# Patient Record
Sex: MALE | Race: BLACK OR AFRICAN AMERICAN | NOT HISPANIC OR LATINO | Employment: OTHER | URBAN - METROPOLITAN AREA
[De-identification: names, ages, dates, MRNs, and addresses within clinical notes are randomized per-mention and may not be internally consistent; named-entity substitution may affect disease eponyms.]

---

## 2018-11-14 ENCOUNTER — HOSPITAL ENCOUNTER (EMERGENCY)
Facility: HOSPITAL | Age: 68
Discharge: HOME | End: 2018-11-14
Attending: SURGERY
Payer: COMMERCIAL

## 2018-11-14 VITALS
WEIGHT: 155 LBS | DIASTOLIC BLOOD PRESSURE: 76 MMHG | HEIGHT: 69 IN | HEART RATE: 65 BPM | SYSTOLIC BLOOD PRESSURE: 142 MMHG | OXYGEN SATURATION: 99 % | RESPIRATION RATE: 15 BRPM | BODY MASS INDEX: 22.96 KG/M2

## 2018-11-14 DIAGNOSIS — M54.2 NECK PAIN: Primary | ICD-10-CM

## 2018-11-14 LAB
ALBUMIN SERPL BCP-MCNC: 4.9 G/DL
ALP SERPL-CCNC: 114 U/L
ALT SERPL W/O P-5'-P-CCNC: 54 U/L
ANION GAP SERPL CALC-SCNC: 11 MMOL/L
AST SERPL-CCNC: 70 U/L
BACTERIA #/AREA URNS AUTO: NORMAL /HPF
BASOPHILS # BLD AUTO: 0.01 K/UL
BASOPHILS NFR BLD: 0.2 %
BILIRUB SERPL-MCNC: 0.9 MG/DL
BILIRUB UR QL STRIP: NEGATIVE
BUN SERPL-MCNC: 22 MG/DL
CALCIUM SERPL-MCNC: 9.9 MG/DL
CHLORIDE SERPL-SCNC: 101 MMOL/L
CLARITY UR REFRACT.AUTO: CLEAR
CO2 SERPL-SCNC: 25 MMOL/L
COLOR UR AUTO: ABNORMAL
CREAT SERPL-MCNC: 1.12 MG/DL
DIFFERENTIAL METHOD: ABNORMAL
EOSINOPHIL # BLD AUTO: 0 K/UL
EOSINOPHIL NFR BLD: 0 %
ERYTHROCYTE [DISTWIDTH] IN BLOOD BY AUTOMATED COUNT: 12.5 %
EST. GFR  (AFRICAN AMERICAN): >60 ML/MIN/1.73 M^2
EST. GFR  (NON AFRICAN AMERICAN): >60 ML/MIN/1.73 M^2
ETHANOL SERPL-MCNC: 152 MG/DL
GLUCOSE SERPL-MCNC: 110 MG/DL
GLUCOSE UR QL STRIP: NEGATIVE
HCT VFR BLD AUTO: 40.9 %
HGB BLD-MCNC: 13.2 G/DL
HGB UR QL STRIP: ABNORMAL
HYALINE CASTS UR QL AUTO: 0 /LPF
INR PPP: 1
KETONES UR QL STRIP: ABNORMAL
LEUKOCYTE ESTERASE UR QL STRIP: ABNORMAL
LYMPHOCYTES # BLD AUTO: 1.7 K/UL
LYMPHOCYTES NFR BLD: 33.5 %
MAGNESIUM SERPL-MCNC: 1.9 MG/DL
MCH RBC QN AUTO: 29.8 PG
MCHC RBC AUTO-ENTMCNC: 32.3 G/DL
MCV RBC AUTO: 92 FL
MICROSCOPIC COMMENT: NORMAL
MONOCYTES # BLD AUTO: 0.4 K/UL
MONOCYTES NFR BLD: 8.7 %
NEUTROPHILS # BLD AUTO: 2.8 K/UL
NEUTROPHILS NFR BLD: 57.4 %
NITRITE UR QL STRIP: NEGATIVE
PH UR STRIP: 5 [PH] (ref 5–8)
PLATELET # BLD AUTO: 185 K/UL
PMV BLD AUTO: 10.5 FL
POTASSIUM SERPL-SCNC: 4.1 MMOL/L
PROT SERPL-MCNC: 8.5 G/DL
PROT UR QL STRIP: ABNORMAL
PROTHROMBIN TIME: 10.3 SEC
RBC # BLD AUTO: 4.43 M/UL
RBC #/AREA URNS AUTO: 2 /HPF (ref 0–4)
SODIUM SERPL-SCNC: 137 MMOL/L
SP GR UR STRIP: 1.02 (ref 1–1.03)
URN SPEC COLLECT METH UR: ABNORMAL
UROBILINOGEN UR STRIP-ACNC: 1 EU/DL
WBC # BLD AUTO: 4.95 K/UL
WBC #/AREA URNS AUTO: 2 /HPF (ref 0–5)

## 2018-11-14 PROCEDURE — 99284 EMERGENCY DEPT VISIT MOD MDM: CPT | Mod: 25

## 2018-11-14 PROCEDURE — 85025 COMPLETE CBC W/AUTO DIFF WBC: CPT

## 2018-11-14 PROCEDURE — 83735 ASSAY OF MAGNESIUM: CPT

## 2018-11-14 PROCEDURE — 80053 COMPREHEN METABOLIC PANEL: CPT

## 2018-11-14 PROCEDURE — 81000 URINALYSIS NONAUTO W/SCOPE: CPT

## 2018-11-14 PROCEDURE — 80320 DRUG SCREEN QUANTALCOHOLS: CPT

## 2018-11-14 PROCEDURE — 85610 PROTHROMBIN TIME: CPT

## 2018-11-14 PROCEDURE — 25000003 PHARM REV CODE 250: Performed by: SURGERY

## 2018-11-14 RX ORDER — OXYCODONE AND ACETAMINOPHEN 5; 325 MG/1; MG/1
1 TABLET ORAL
Status: COMPLETED | OUTPATIENT
Start: 2018-11-14 | End: 2018-11-14

## 2018-11-14 RX ADMIN — OXYCODONE HYDROCHLORIDE AND ACETAMINOPHEN 1 TABLET: 5; 325 TABLET ORAL at 05:11

## 2018-11-14 NOTE — ED PROVIDER NOTES
Encounter Date: 11/14/2018       History     Chief Complaint   Patient presents with    Arthritis     Arthritic pain that started one hour ago. Patient states that his joints locked up on the left side of his neck. Called daughter concerned for stroke because of numbness.       Patient was watching TV sitting in a chair when he started to get pain at the base of his head and pain and numbness down his left side of his neck to top of his left shoulder going to his left arm.  The episode started 1 hr ago He does have a history of severe arthritis and hypertension.  He does not have any speech difficulty and has no difficulty using his arm or legs.  He does have alcohol on his breath      The history is provided by the patient.   Headache    This is a new problem. The current episode started today. The problem has been unchanged. The pain is located in the left unilateral region. The pain radiates to the left arm. The pain quality is not similar to prior headaches. The quality of the pain is described as localized, pulsating and throbbing. The pain is at a severity of 10/10. Associated symptoms include neck pain and numbness. Pertinent negatives include no abdominal pain, blurred vision, loss of balance, muscle aches, scalp tenderness or sinus pressure. Nothing aggravates the symptoms.     Review of patient's allergies indicates:  No Known Allergies  History reviewed. No pertinent past medical history.  History reviewed. No pertinent surgical history.  History reviewed. No pertinent family history.  Social History     Tobacco Use    Smoking status: Current Every Day Smoker     Packs/day: 0.50   Substance Use Topics    Alcohol use: Yes     Alcohol/week: 0.0 oz     Comment: Gin    Drug use: Not on file     Review of Systems   Constitutional: Negative.    HENT: Negative.  Negative for sinus pressure.    Eyes: Negative.  Negative for blurred vision.   Respiratory: Negative.    Cardiovascular: Negative.     Gastrointestinal: Negative for abdominal pain.   Endocrine: Negative.    Musculoskeletal: Positive for neck pain.   Skin: Negative.    Neurological: Positive for numbness and headaches. Negative for loss of balance.   Hematological: Negative.    Psychiatric/Behavioral: Negative.        Physical Exam     Initial Vitals [11/14/18 1714]   BP Pulse Resp Temp SpO2   (!) 186/99 77 18 -- 99 %      MAP       --         Physical Exam    Nursing note and vitals reviewed.  Constitutional: He appears well-developed and well-nourished.   HENT:   Head: Normocephalic.   Eyes: Conjunctivae are normal.   Neck: Normal range of motion. Neck supple. No JVD present.       Cardiovascular: Normal rate and intact distal pulses.   Pulmonary/Chest: Breath sounds normal. No stridor.   Abdominal: Soft.   Musculoskeletal: He exhibits tenderness.   Lymphadenopathy:     He has no cervical adenopathy.   Neurological: He is alert and oriented to person, place, and time. GCS score is 15. GCS eye subscore is 4. GCS verbal subscore is 5. GCS motor subscore is 6.   NIH scale 0   Skin: Skin is warm and dry. Capillary refill takes less than 2 seconds.   Psychiatric: He has a normal mood and affect.         ED Course   Procedures  Labs Reviewed - No data to display       Imaging Results          CT Head Without Contrast (In process)                CT Cervical Spine Without Contrast (In process)                                       Clinical Impression:   The encounter diagnosis was Neck pain.                             Eliu Márquez MD  11/14/18 [id number]

## 2020-06-08 ENCOUNTER — HOSPITAL ENCOUNTER (EMERGENCY)
Facility: HOSPITAL | Age: 70
Discharge: HOME | End: 2020-06-08
Attending: EMERGENCY MEDICINE
Payer: COMMERCIAL

## 2020-06-08 VITALS
TEMPERATURE: 98 F | BODY MASS INDEX: 23.25 KG/M2 | HEIGHT: 66 IN | HEART RATE: 72 BPM | DIASTOLIC BLOOD PRESSURE: 64 MMHG | SYSTOLIC BLOOD PRESSURE: 102 MMHG | WEIGHT: 144.69 LBS | RESPIRATION RATE: 16 BRPM | OXYGEN SATURATION: 99 %
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
## 2020-06-08 DIAGNOSIS — S00.81XA: ICD-10-CM

## 2020-06-08 DIAGNOSIS — F10.921 ALCOHOL INTOXICATION WITH DELIRIUM: ICD-10-CM

## 2020-06-08 LAB — ETHANOL SERPL-MCNC: 369 MG/DL

## 2020-06-08 PROCEDURE — 96365 THER/PROPH/DIAG IV INF INIT: CPT | Mod: ER

## 2020-06-08 PROCEDURE — 25000003 PHARM REV CODE 250: Mod: ER | Performed by: EMERGENCY MEDICINE

## 2020-06-08 PROCEDURE — 90471 IMMUNIZATION ADMIN: CPT | Mod: ER | Performed by: EMERGENCY MEDICINE

## 2020-06-08 PROCEDURE — 63600175 PHARM REV CODE 636 W HCPCS: Mod: ER | Performed by: EMERGENCY MEDICINE

## 2020-06-08 PROCEDURE — 96368 THER/DIAG CONCURRENT INF: CPT | Mod: ER

## 2020-06-08 PROCEDURE — 80320 DRUG SCREEN QUANTALCOHOLS: CPT | Mod: ER

## 2020-06-08 PROCEDURE — 99284 EMERGENCY DEPT VISIT MOD MDM: CPT | Mod: 25,ER

## 2020-06-08 PROCEDURE — 90715 TDAP VACCINE 7 YRS/> IM: CPT | Mod: ER | Performed by: EMERGENCY MEDICINE

## 2020-06-08 RX ADMIN — CLOSTRIDIUM TETANI TOXOID ANTIGEN (FORMALDEHYDE INACTIVATED), CORYNEBACTERIUM DIPHTHERIAE TOXOID ANTIGEN (FORMALDEHYDE INACTIVATED), BORDETELLA PERTUSSIS TOXOID ANTIGEN (GLUTARALDEHYDE INACTIVATED), BORDETELLA PERTUSSIS FILAMENTOUS HEMAGGLUTININ ANTIGEN (FORMALDEHYDE INACTIVATED), BORDETELLA PERTUSSIS PERTACTIN ANTIGEN, AND BORDETELLA PERTUSSIS FIMBRIAE 2/3 ANTIGEN 0.5 ML: 5; 2; 2.5; 5; 3; 5 INJECTION, SUSPENSION INTRAMUSCULAR at 08:06

## 2020-06-08 RX ADMIN — THIAMINE HYDROCHLORIDE 100 MG: 100 INJECTION, SOLUTION INTRAMUSCULAR; INTRAVENOUS at 09:06

## 2020-06-08 RX ADMIN — ASCORBIC ACID, VITAMIN A PALMITATE, CHOLECALCIFEROL, THIAMINE HYDROCHLORIDE, RIBOFLAVIN-5 PHOSPHATE SODIUM, PYRIDOXINE HYDROCHLORIDE, NIACINAMIDE, DEXPANTHENOL, ALPHA-TOCOPHEROL ACETATE, VITAMIN K1, FOLIC ACID, BIOTIN, CYANOCOBALAMIN: 200; 3300; 200; 6; 3.6; 6; 40; 15; 10; 150; 600; 60; 5 INJECTION, SOLUTION INTRAVENOUS at 09:06

## 2020-06-08 RX ADMIN — BACITRACIN, NEOMYCIN, POLYMYXIN B 1 EACH: 400; 3.5; 5 OINTMENT TOPICAL at 08:06

## 2020-06-08 RX ADMIN — FOLIC ACID 1 MG: 5 INJECTION, SOLUTION INTRAMUSCULAR; INTRAVENOUS; SUBCUTANEOUS at 09:06

## 2020-06-09 NOTE — ED NOTES
Pt confused and pulled out his IV. Only received 200ML IVF's. Made MD aware. Pt is awake, alert and communicative. Discharge indicated.
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
## 2021-01-20 ENCOUNTER — HOSPITAL ENCOUNTER (EMERGENCY)
Facility: HOSPITAL | Age: 71
Discharge: HOME | End: 2021-01-20
Attending: EMERGENCY MEDICINE
Payer: COMMERCIAL

## 2021-01-20 VITALS
SYSTOLIC BLOOD PRESSURE: 119 MMHG | DIASTOLIC BLOOD PRESSURE: 74 MMHG | BODY MASS INDEX: 23.11 KG/M2 | RESPIRATION RATE: 18 BRPM | TEMPERATURE: 99 F | OXYGEN SATURATION: 99 % | HEIGHT: 69 IN | HEART RATE: 70 BPM | WEIGHT: 156 LBS
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
## 2021-01-20 DIAGNOSIS — T07.XXXA MULTIPLE ABRASIONS: ICD-10-CM

## 2021-01-20 PROCEDURE — 99283 EMERGENCY DEPT VISIT LOW MDM: CPT | Mod: ER

## 2021-01-20 PROCEDURE — 25000003 PHARM REV CODE 250: Mod: ER | Performed by: EMERGENCY MEDICINE

## 2021-01-20 RX ORDER — TRAMADOL HYDROCHLORIDE AND ACETAMINOPHEN 37.5; 325 MG/1; MG/1
1 TABLET, FILM COATED ORAL EVERY 6 HOURS PRN
Qty: 9 TABLET | Refills: 0 | Status: ON HOLD | OUTPATIENT
Start: 2021-01-20 | End: 2021-09-07 | Stop reason: HOSPADM

## 2021-01-20 RX ADMIN — BACITRACIN ZINC NEOMYCIN SULFATE POLYMYXIN B SULFATE: 400; 3.5; 5 OINTMENT TOPICAL at 11:01

## 2021-06-30 ENCOUNTER — HOSPITAL ENCOUNTER (EMERGENCY)
Facility: HOSPITAL | Age: 71
Discharge: HOME | End: 2021-06-30
Attending: EMERGENCY MEDICINE
Payer: COMMERCIAL

## 2021-06-30 VITALS
SYSTOLIC BLOOD PRESSURE: 111 MMHG | BODY MASS INDEX: 21.48 KG/M2 | HEART RATE: 81 BPM | HEIGHT: 69 IN | WEIGHT: 145 LBS | DIASTOLIC BLOOD PRESSURE: 59 MMHG | RESPIRATION RATE: 20 BRPM | TEMPERATURE: 99 F | OXYGEN SATURATION: 97 %
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
## 2021-06-30 DIAGNOSIS — S29.9XXA CHEST TRAUMA: ICD-10-CM

## 2021-06-30 DIAGNOSIS — S20.211A CONTUSION OF RIGHT CHEST WALL, INITIAL ENCOUNTER: Primary | ICD-10-CM

## 2021-06-30 LAB — POCT GLUCOSE: 75 MG/DL (ref 70–110)

## 2021-06-30 PROCEDURE — 93005 ELECTROCARDIOGRAM TRACING: CPT | Mod: ER

## 2021-06-30 PROCEDURE — 93010 EKG 12-LEAD: ICD-10-PCS | Mod: ,,, | Performed by: INTERNAL MEDICINE

## 2021-06-30 PROCEDURE — 82962 GLUCOSE BLOOD TEST: CPT | Mod: ER

## 2021-06-30 PROCEDURE — 99284 EMERGENCY DEPT VISIT MOD MDM: CPT | Mod: 25,ER

## 2021-06-30 PROCEDURE — 93010 ELECTROCARDIOGRAM REPORT: CPT | Mod: ,,, | Performed by: INTERNAL MEDICINE

## 2021-09-06 ENCOUNTER — HOSPITAL ENCOUNTER (OUTPATIENT)
Facility: HOSPITAL | Age: 71
Discharge: HOME | End: 2021-09-07
Attending: EMERGENCY MEDICINE | Admitting: INTERNAL MEDICINE
Payer: COMMERCIAL

## 2021-09-06 DIAGNOSIS — M79.652 ACUTE PAIN OF LEFT THIGH: ICD-10-CM

## 2021-09-06 DIAGNOSIS — M25.551 ACUTE RIGHT HIP PAIN: ICD-10-CM

## 2021-09-06 DIAGNOSIS — R10.12 ACUTE LUQ PAIN: ICD-10-CM
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
## 2021-09-06 DIAGNOSIS — M79.641 RIGHT HAND PAIN: ICD-10-CM

## 2021-09-06 DIAGNOSIS — S09.90XA CLOSED HEAD INJURY DUE TO BICYCLE ACCIDENT, INITIAL ENCOUNTER: Primary | ICD-10-CM

## 2021-09-06 DIAGNOSIS — R07.9 CHEST PAIN: ICD-10-CM

## 2021-09-06 DIAGNOSIS — M54.9 ACUTE UPPER BACK PAIN: ICD-10-CM

## 2021-09-06 DIAGNOSIS — T07.XXXA CRITICAL POLYTRAUMA: ICD-10-CM

## 2021-09-06 DIAGNOSIS — T14.8XXA SKIN AVULSION: ICD-10-CM

## 2021-09-06 PROBLEM — R03.0 ELEVATED BLOOD PRESSURE READING WITHOUT DIAGNOSIS OF HYPERTENSION: Status: ACTIVE | Noted: 2021-09-06

## 2021-09-06 PROBLEM — N17.9 AKI (ACUTE KIDNEY INJURY): Status: ACTIVE | Noted: 2021-09-06

## 2021-09-06 PROBLEM — R17 SERUM TOTAL BILIRUBIN ELEVATED: Status: ACTIVE | Noted: 2021-09-06

## 2021-09-06 LAB
ABO + RH BLD: NORMAL
ALBUMIN SERPL BCP-MCNC: 3.9 G/DL (ref 3.5–5.2)
ALP SERPL-CCNC: 87 U/L (ref 55–135)
ALT SERPL W/O P-5'-P-CCNC: 26 U/L (ref 10–44)
ANION GAP SERPL CALC-SCNC: 12 MMOL/L (ref 8–16)
APTT BLDCRRT: 24.7 SEC (ref 21–32)
AST SERPL-CCNC: 42 U/L (ref 10–40)
BACTERIA #/AREA URNS AUTO: ABNORMAL /HPF
BASOPHILS # BLD AUTO: 0.04 K/UL (ref 0–0.2)
BASOPHILS NFR BLD: 0.8 % (ref 0–1.9)
BILIRUB SERPL-MCNC: 1.2 MG/DL (ref 0.1–1)
BILIRUB UR QL STRIP: NEGATIVE
BLD GP AB SCN CELLS X3 SERPL QL: NORMAL
BUN SERPL-MCNC: 37 MG/DL (ref 6–30)
BUN SERPL-MCNC: 37 MG/DL (ref 8–23)
CALCIUM SERPL-MCNC: 9.3 MG/DL (ref 8.7–10.5)
CHLORIDE SERPL-SCNC: 105 MMOL/L (ref 95–110)
CHLORIDE SERPL-SCNC: 106 MMOL/L (ref 95–110)
CLARITY UR REFRACT.AUTO: CLEAR
CO2 SERPL-SCNC: 20 MMOL/L (ref 23–29)
COLOR UR AUTO: YELLOW
CREAT SERPL-MCNC: 1.7 MG/DL (ref 0.5–1.4)
CREAT SERPL-MCNC: 2.2 MG/DL (ref 0.5–1.4)
CTP QC/QA: YES
DIFFERENTIAL METHOD: ABNORMAL
EOSINOPHIL # BLD AUTO: 0 K/UL (ref 0–0.5)
EOSINOPHIL NFR BLD: 0.8 % (ref 0–8)
ERYTHROCYTE [DISTWIDTH] IN BLOOD BY AUTOMATED COUNT: 12.3 % (ref 11.5–14.5)
EST. GFR  (AFRICAN AMERICAN): 45.2 ML/MIN/1.73 M^2
EST. GFR  (NON AFRICAN AMERICAN): 39.1 ML/MIN/1.73 M^2
GLUCOSE SERPL-MCNC: 83 MG/DL (ref 70–110)
GLUCOSE SERPL-MCNC: 87 MG/DL (ref 70–110)
GLUCOSE UR QL STRIP: NEGATIVE
HCT VFR BLD AUTO: 36 % (ref 40–54)
HCT VFR BLD CALC: 39 %PCV (ref 36–54)
HGB BLD-MCNC: 11.5 G/DL (ref 14–18)
HGB UR QL STRIP: ABNORMAL
IMM GRANULOCYTES # BLD AUTO: 0.02 K/UL (ref 0–0.04)
IMM GRANULOCYTES NFR BLD AUTO: 0.4 % (ref 0–0.5)
INR PPP: 0.9 (ref 0.8–1.2)
KETONES UR QL STRIP: NEGATIVE
LEUKOCYTE ESTERASE UR QL STRIP: ABNORMAL
LYMPHOCYTES # BLD AUTO: 1.5 K/UL (ref 1–4.8)
LYMPHOCYTES NFR BLD: 31.3 % (ref 18–48)
MCH RBC QN AUTO: 31 PG (ref 27–31)
MCHC RBC AUTO-ENTMCNC: 31.9 G/DL (ref 32–36)
MCV RBC AUTO: 97 FL (ref 82–98)
MICROSCOPIC COMMENT: ABNORMAL
MONOCYTES # BLD AUTO: 0.4 K/UL (ref 0.3–1)
MONOCYTES NFR BLD: 8.8 % (ref 4–15)
NEUTROPHILS # BLD AUTO: 2.8 K/UL (ref 1.8–7.7)
NEUTROPHILS NFR BLD: 57.9 % (ref 38–73)
NITRITE UR QL STRIP: NEGATIVE
NRBC BLD-RTO: 0 /100 WBC
PH UR STRIP: 5 [PH] (ref 5–8)
PLATELET # BLD AUTO: 217 K/UL (ref 150–450)
PMV BLD AUTO: 10.1 FL (ref 9.2–12.9)
POC IONIZED CALCIUM: 1.25 MMOL/L (ref 1.06–1.42)
POC TCO2 (MEASURED): 23 MMOL/L (ref 23–29)
POTASSIUM BLD-SCNC: 4.6 MMOL/L (ref 3.5–5.1)
POTASSIUM SERPL-SCNC: 4.3 MMOL/L (ref 3.5–5.1)
PROT SERPL-MCNC: 6.9 G/DL (ref 6–8.4)
PROT UR QL STRIP: NEGATIVE
PROTHROMBIN TIME: 9.9 SEC (ref 9–12.5)
RBC # BLD AUTO: 3.71 M/UL (ref 4.6–6.2)
RBC #/AREA URNS AUTO: 1 /HPF (ref 0–4)
SAMPLE: ABNORMAL
SARS-COV-2 RDRP RESP QL NAA+PROBE: NEGATIVE
SODIUM BLD-SCNC: 138 MMOL/L (ref 136–145)
SODIUM SERPL-SCNC: 138 MMOL/L (ref 136–145)
SP GR UR STRIP: 1.01 (ref 1–1.03)
SQUAMOUS #/AREA URNS AUTO: 0 /HPF
URN SPEC COLLECT METH UR: ABNORMAL
WBC # BLD AUTO: 4.8 K/UL (ref 3.9–12.7)
WBC #/AREA URNS AUTO: 10 /HPF (ref 0–5)

## 2021-09-06 PROCEDURE — 25000003 PHARM REV CODE 250: Performed by: PHYSICIAN ASSISTANT

## 2021-09-06 PROCEDURE — 63600175 PHARM REV CODE 636 W HCPCS: Performed by: STUDENT IN AN ORGANIZED HEALTH CARE EDUCATION/TRAINING PROGRAM

## 2021-09-06 PROCEDURE — G0378 HOSPITAL OBSERVATION PER HR: HCPCS

## 2021-09-06 PROCEDURE — 90715 TDAP VACCINE 7 YRS/> IM: CPT | Performed by: STUDENT IN AN ORGANIZED HEALTH CARE EDUCATION/TRAINING PROGRAM

## 2021-09-06 PROCEDURE — 96372 THER/PROPH/DIAG INJ SC/IM: CPT | Mod: 59

## 2021-09-06 PROCEDURE — 80053 COMPREHEN METABOLIC PANEL: CPT | Performed by: STUDENT IN AN ORGANIZED HEALTH CARE EDUCATION/TRAINING PROGRAM

## 2021-09-06 PROCEDURE — 85610 PROTHROMBIN TIME: CPT | Performed by: STUDENT IN AN ORGANIZED HEALTH CARE EDUCATION/TRAINING PROGRAM

## 2021-09-06 PROCEDURE — 96374 THER/PROPH/DIAG INJ IV PUSH: CPT

## 2021-09-06 PROCEDURE — 99220 PR INITIAL OBSERVATION CARE,LEVL III: CPT | Mod: ,,, | Performed by: PHYSICIAN ASSISTANT

## 2021-09-06 PROCEDURE — 25000003 PHARM REV CODE 250: Performed by: EMERGENCY MEDICINE

## 2021-09-06 PROCEDURE — U0002 COVID-19 LAB TEST NON-CDC: HCPCS | Performed by: STUDENT IN AN ORGANIZED HEALTH CARE EDUCATION/TRAINING PROGRAM

## 2021-09-06 PROCEDURE — 99220 PR INITIAL OBSERVATION CARE,LEVL III: ICD-10-PCS | Mod: ,,, | Performed by: PHYSICIAN ASSISTANT

## 2021-09-06 PROCEDURE — 90471 IMMUNIZATION ADMIN: CPT | Performed by: STUDENT IN AN ORGANIZED HEALTH CARE EDUCATION/TRAINING PROGRAM

## 2021-09-06 PROCEDURE — 63600175 PHARM REV CODE 636 W HCPCS: Performed by: PHYSICIAN ASSISTANT

## 2021-09-06 PROCEDURE — 99285 PR EMERGENCY DEPT VISIT,LEVEL V: ICD-10-PCS | Mod: CS,,, | Performed by: EMERGENCY MEDICINE

## 2021-09-06 PROCEDURE — 99285 EMERGENCY DEPT VISIT HI MDM: CPT | Mod: CS,,, | Performed by: EMERGENCY MEDICINE

## 2021-09-06 PROCEDURE — 85730 THROMBOPLASTIN TIME PARTIAL: CPT | Performed by: STUDENT IN AN ORGANIZED HEALTH CARE EDUCATION/TRAINING PROGRAM

## 2021-09-06 PROCEDURE — 93010 ELECTROCARDIOGRAM REPORT: CPT | Mod: ,,, | Performed by: INTERNAL MEDICINE

## 2021-09-06 PROCEDURE — 85025 COMPLETE CBC W/AUTO DIFF WBC: CPT | Performed by: STUDENT IN AN ORGANIZED HEALTH CARE EDUCATION/TRAINING PROGRAM

## 2021-09-06 PROCEDURE — 96361 HYDRATE IV INFUSION ADD-ON: CPT

## 2021-09-06 PROCEDURE — 81001 URINALYSIS AUTO W/SCOPE: CPT | Performed by: PHYSICIAN ASSISTANT

## 2021-09-06 PROCEDURE — 86900 BLOOD TYPING SEROLOGIC ABO: CPT | Performed by: STUDENT IN AN ORGANIZED HEALTH CARE EDUCATION/TRAINING PROGRAM

## 2021-09-06 PROCEDURE — 86803 HEPATITIS C AB TEST: CPT | Performed by: EMERGENCY MEDICINE

## 2021-09-06 PROCEDURE — 80047 BASIC METABLC PNL IONIZED CA: CPT | Mod: 91

## 2021-09-06 PROCEDURE — 93010 EKG 12-LEAD: ICD-10-PCS | Mod: ,,, | Performed by: INTERNAL MEDICINE

## 2021-09-06 PROCEDURE — 99285 EMERGENCY DEPT VISIT HI MDM: CPT | Mod: 25

## 2021-09-06 PROCEDURE — 93005 ELECTROCARDIOGRAM TRACING: CPT

## 2021-09-06 RX ORDER — GLUCAGON 1 MG
1 KIT INJECTION
Status: DISCONTINUED | OUTPATIENT
Start: 2021-09-06 | End: 2021-09-07 | Stop reason: HOSPADM

## 2021-09-06 RX ORDER — IBUPROFEN 200 MG
24 TABLET ORAL
Status: DISCONTINUED | OUTPATIENT
Start: 2021-09-06 | End: 2021-09-07 | Stop reason: HOSPADM

## 2021-09-06 RX ORDER — SODIUM CHLORIDE, SODIUM LACTATE, POTASSIUM CHLORIDE, CALCIUM CHLORIDE 600; 310; 30; 20 MG/100ML; MG/100ML; MG/100ML; MG/100ML
INJECTION, SOLUTION INTRAVENOUS CONTINUOUS
Status: ACTIVE | OUTPATIENT
Start: 2021-09-06 | End: 2021-09-07

## 2021-09-06 RX ORDER — POLYETHYLENE GLYCOL 3350 17 G/17G
17 POWDER, FOR SOLUTION ORAL 2 TIMES DAILY PRN
Status: DISCONTINUED | OUTPATIENT
Start: 2021-09-06 | End: 2021-09-07 | Stop reason: HOSPADM

## 2021-09-06 RX ORDER — LIDOCAINE 50 MG/G
2 PATCH TOPICAL
Status: DISCONTINUED | OUTPATIENT
Start: 2021-09-06 | End: 2021-09-07 | Stop reason: HOSPADM

## 2021-09-06 RX ORDER — ENOXAPARIN SODIUM 100 MG/ML
40 INJECTION SUBCUTANEOUS EVERY 24 HOURS
Status: DISCONTINUED | OUTPATIENT
Start: 2021-09-06 | End: 2021-09-07 | Stop reason: HOSPADM

## 2021-09-06 RX ORDER — OXYCODONE HYDROCHLORIDE 5 MG/1
5 TABLET ORAL EVERY 6 HOURS PRN
Status: DISCONTINUED | OUTPATIENT
Start: 2021-09-06 | End: 2021-09-07 | Stop reason: HOSPADM

## 2021-09-06 RX ORDER — ACETAMINOPHEN 325 MG/1
650 TABLET ORAL EVERY 4 HOURS PRN
Status: DISCONTINUED | OUTPATIENT
Start: 2021-09-06 | End: 2021-09-07 | Stop reason: HOSPADM

## 2021-09-06 RX ORDER — POLYETHYLENE GLYCOL 3350 17 G/17G
17 POWDER, FOR SOLUTION ORAL DAILY
Status: DISCONTINUED | OUTPATIENT
Start: 2021-09-07 | End: 2021-09-06

## 2021-09-06 RX ORDER — IBUPROFEN 200 MG
16 TABLET ORAL
Status: DISCONTINUED | OUTPATIENT
Start: 2021-09-06 | End: 2021-09-07 | Stop reason: HOSPADM

## 2021-09-06 RX ORDER — TALC
6 POWDER (GRAM) TOPICAL NIGHTLY PRN
Status: DISCONTINUED | OUTPATIENT
Start: 2021-09-06 | End: 2021-09-07 | Stop reason: HOSPADM

## 2021-09-06 RX ORDER — IBUPROFEN 200 MG
1 TABLET ORAL DAILY PRN
Status: DISCONTINUED | OUTPATIENT
Start: 2021-09-06 | End: 2021-09-07 | Stop reason: HOSPADM

## 2021-09-06 RX ORDER — FENTANYL CITRATE 50 UG/ML
70 INJECTION, SOLUTION INTRAMUSCULAR; INTRAVENOUS
Status: COMPLETED | OUTPATIENT
Start: 2021-09-06 | End: 2021-09-06

## 2021-09-06 RX ORDER — BISACODYL 10 MG
10 SUPPOSITORY, RECTAL RECTAL DAILY PRN
Status: DISCONTINUED | OUTPATIENT
Start: 2021-09-06 | End: 2021-09-07 | Stop reason: HOSPADM

## 2021-09-06 RX ORDER — ONDANSETRON 8 MG/1
8 TABLET, ORALLY DISINTEGRATING ORAL EVERY 8 HOURS PRN
Status: DISCONTINUED | OUTPATIENT
Start: 2021-09-06 | End: 2021-09-07 | Stop reason: HOSPADM

## 2021-09-06 RX ORDER — OXYCODONE HYDROCHLORIDE 10 MG/1
10 TABLET ORAL EVERY 6 HOURS PRN
Status: DISCONTINUED | OUTPATIENT
Start: 2021-09-06 | End: 2021-09-07 | Stop reason: HOSPADM

## 2021-09-06 RX ORDER — METHOCARBAMOL 500 MG/1
500 TABLET, FILM COATED ORAL 4 TIMES DAILY
Status: DISCONTINUED | OUTPATIENT
Start: 2021-09-06 | End: 2021-09-07 | Stop reason: HOSPADM

## 2021-09-06 RX ORDER — ACETAMINOPHEN 500 MG
1000 TABLET ORAL EVERY 8 HOURS
Status: DISCONTINUED | OUTPATIENT
Start: 2021-09-07 | End: 2021-09-06

## 2021-09-06 RX ORDER — IPRATROPIUM BROMIDE AND ALBUTEROL SULFATE 2.5; .5 MG/3ML; MG/3ML
3 SOLUTION RESPIRATORY (INHALATION) EVERY 4 HOURS PRN
Status: DISCONTINUED | OUTPATIENT
Start: 2021-09-06 | End: 2021-09-07 | Stop reason: HOSPADM

## 2021-09-06 RX ADMIN — ENOXAPARIN SODIUM 40 MG: 40 INJECTION SUBCUTANEOUS at 07:09

## 2021-09-06 RX ADMIN — CLOSTRIDIUM TETANI TOXOID ANTIGEN (FORMALDEHYDE INACTIVATED), CORYNEBACTERIUM DIPHTHERIAE TOXOID ANTIGEN (FORMALDEHYDE INACTIVATED), BORDETELLA PERTUSSIS TOXOID ANTIGEN (GLUTARALDEHYDE INACTIVATED), BORDETELLA PERTUSSIS FILAMENTOUS HEMAGGLUTININ ANTIGEN (FORMALDEHYDE INACTIVATED), BORDETELLA PERTUSSIS PERTACTIN ANTIGEN, AND BORDETELLA PERTUSSIS FIMBRIAE 2/3 ANTIGEN 0.5 ML: 5; 2; 2.5; 5; 3; 5 INJECTION, SUSPENSION INTRAMUSCULAR at 05:09

## 2021-09-06 RX ADMIN — SODIUM CHLORIDE, SODIUM LACTATE, POTASSIUM CHLORIDE, AND CALCIUM CHLORIDE: .6; .31; .03; .02 INJECTION, SOLUTION INTRAVENOUS at 07:09

## 2021-09-06 RX ADMIN — LIDOCAINE 2 PATCH: 50 PATCH TOPICAL at 07:09

## 2021-09-06 RX ADMIN — SODIUM CHLORIDE 1000 ML: 0.9 INJECTION, SOLUTION INTRAVENOUS at 04:09

## 2021-09-06 RX ADMIN — FENTANYL CITRATE 70 MCG: 50 INJECTION INTRAMUSCULAR; INTRAVENOUS at 04:09

## 2021-09-06 RX ADMIN — OXYCODONE 10 MG: 5 TABLET ORAL at 07:09

## 2021-09-06 RX ADMIN — METHOCARBAMOL 500 MG: 500 TABLET ORAL at 09:09

## 2021-09-07 VITALS
TEMPERATURE: 97 F | OXYGEN SATURATION: 100 % | HEART RATE: 80 BPM | HEIGHT: 68 IN | SYSTOLIC BLOOD PRESSURE: 128 MMHG | WEIGHT: 155 LBS | BODY MASS INDEX: 23.49 KG/M2 | DIASTOLIC BLOOD PRESSURE: 80 MMHG | RESPIRATION RATE: 16 BRPM

## 2021-09-07 LAB
ALBUMIN SERPL BCP-MCNC: 3.9 G/DL (ref 3.5–5.2)
ALP SERPL-CCNC: 90 U/L (ref 55–135)
ALT SERPL W/O P-5'-P-CCNC: 26 U/L (ref 10–44)
ANION GAP SERPL CALC-SCNC: 12 MMOL/L (ref 8–16)
AST SERPL-CCNC: 52 U/L (ref 10–40)
BASOPHILS # BLD AUTO: 0.02 K/UL (ref 0–0.2)
BASOPHILS NFR BLD: 0.3 % (ref 0–1.9)
BILIRUB SERPL-MCNC: 1.9 MG/DL (ref 0.1–1)
BUN SERPL-MCNC: 25 MG/DL (ref 8–23)
CALCIUM SERPL-MCNC: 9.5 MG/DL (ref 8.7–10.5)
CHLORIDE SERPL-SCNC: 104 MMOL/L (ref 95–110)
CO2 SERPL-SCNC: 19 MMOL/L (ref 23–29)
CREAT SERPL-MCNC: 1.1 MG/DL (ref 0.5–1.4)
DIFFERENTIAL METHOD: ABNORMAL
EOSINOPHIL # BLD AUTO: 0 K/UL (ref 0–0.5)
EOSINOPHIL NFR BLD: 0.1 % (ref 0–8)
ERYTHROCYTE [DISTWIDTH] IN BLOOD BY AUTOMATED COUNT: 12.2 % (ref 11.5–14.5)
EST. GFR  (AFRICAN AMERICAN): >60 ML/MIN/1.73 M^2
EST. GFR  (NON AFRICAN AMERICAN): >60 ML/MIN/1.73 M^2
ESTIMATED AVG GLUCOSE: 88 MG/DL (ref 68–131)
FERRITIN SERPL-MCNC: 633 NG/ML (ref 20–300)
FOLATE SERPL-MCNC: 9.2 NG/ML (ref 4–24)
GLUCOSE SERPL-MCNC: 108 MG/DL (ref 70–110)
HBA1C MFR BLD: 4.7 % (ref 4–5.6)
HCT VFR BLD AUTO: 37.1 % (ref 40–54)
HCV AB SERPL QL IA: NEGATIVE
HGB BLD-MCNC: 11.9 G/DL (ref 14–18)
IMM GRANULOCYTES # BLD AUTO: 0.01 K/UL (ref 0–0.04)
IMM GRANULOCYTES NFR BLD AUTO: 0.1 % (ref 0–0.5)
IRON SERPL-MCNC: 42 UG/DL (ref 45–160)
LYMPHOCYTES # BLD AUTO: 0.7 K/UL (ref 1–4.8)
LYMPHOCYTES NFR BLD: 11 % (ref 18–48)
MAGNESIUM SERPL-MCNC: 1.6 MG/DL (ref 1.6–2.6)
MCH RBC QN AUTO: 31 PG (ref 27–31)
MCHC RBC AUTO-ENTMCNC: 32.1 G/DL (ref 32–36)
MCV RBC AUTO: 97 FL (ref 82–98)
MONOCYTES # BLD AUTO: 0.7 K/UL (ref 0.3–1)
MONOCYTES NFR BLD: 10.2 % (ref 4–15)
NEUTROPHILS # BLD AUTO: 5.3 K/UL (ref 1.8–7.7)
NEUTROPHILS NFR BLD: 78.3 % (ref 38–73)
NRBC BLD-RTO: 0 /100 WBC
PHOSPHATE SERPL-MCNC: 2.4 MG/DL (ref 2.7–4.5)
PLATELET # BLD AUTO: 201 K/UL (ref 150–450)
PMV BLD AUTO: 11.5 FL (ref 9.2–12.9)
POTASSIUM SERPL-SCNC: 3.8 MMOL/L (ref 3.5–5.1)
PROT SERPL-MCNC: 6.8 G/DL (ref 6–8.4)
RBC # BLD AUTO: 3.84 M/UL (ref 4.6–6.2)
SATURATED IRON: 14 % (ref 20–50)
SODIUM SERPL-SCNC: 135 MMOL/L (ref 136–145)
TOTAL IRON BINDING CAPACITY: 308 UG/DL (ref 250–450)
TRANSFERRIN SERPL-MCNC: 208 MG/DL (ref 200–375)
VIT B12 SERPL-MCNC: 431 PG/ML (ref 210–950)
WBC # BLD AUTO: 6.75 K/UL (ref 3.9–12.7)

## 2021-09-07 PROCEDURE — 99217 PR OBSERVATION CARE DISCHARGE: CPT | Mod: ,,, | Performed by: PHYSICIAN ASSISTANT

## 2021-09-07 PROCEDURE — 82607 VITAMIN B-12: CPT | Performed by: PHYSICIAN ASSISTANT

## 2021-09-07 PROCEDURE — 25000003 PHARM REV CODE 250: Performed by: PHYSICIAN ASSISTANT

## 2021-09-07 PROCEDURE — 83735 ASSAY OF MAGNESIUM: CPT | Performed by: PHYSICIAN ASSISTANT

## 2021-09-07 PROCEDURE — 82728 ASSAY OF FERRITIN: CPT | Performed by: PHYSICIAN ASSISTANT

## 2021-09-07 PROCEDURE — 84466 ASSAY OF TRANSFERRIN: CPT | Performed by: PHYSICIAN ASSISTANT

## 2021-09-07 PROCEDURE — 97116 GAIT TRAINING THERAPY: CPT

## 2021-09-07 PROCEDURE — 85025 COMPLETE CBC W/AUTO DIFF WBC: CPT | Performed by: PHYSICIAN ASSISTANT

## 2021-09-07 PROCEDURE — 99217 PR OBSERVATION CARE DISCHARGE: ICD-10-PCS | Mod: ,,, | Performed by: PHYSICIAN ASSISTANT

## 2021-09-07 PROCEDURE — G0378 HOSPITAL OBSERVATION PER HR: HCPCS

## 2021-09-07 PROCEDURE — 83036 HEMOGLOBIN GLYCOSYLATED A1C: CPT | Performed by: PHYSICIAN ASSISTANT

## 2021-09-07 PROCEDURE — 97535 SELF CARE MNGMENT TRAINING: CPT

## 2021-09-07 PROCEDURE — 80053 COMPREHEN METABOLIC PANEL: CPT | Performed by: PHYSICIAN ASSISTANT

## 2021-09-07 PROCEDURE — 84100 ASSAY OF PHOSPHORUS: CPT | Performed by: PHYSICIAN ASSISTANT

## 2021-09-07 PROCEDURE — 82746 ASSAY OF FOLIC ACID SERUM: CPT | Performed by: PHYSICIAN ASSISTANT

## 2021-09-07 PROCEDURE — 97165 OT EVAL LOW COMPLEX 30 MIN: CPT

## 2021-09-07 PROCEDURE — 97161 PT EVAL LOW COMPLEX 20 MIN: CPT

## 2021-09-07 PROCEDURE — 36415 COLL VENOUS BLD VENIPUNCTURE: CPT | Performed by: PHYSICIAN ASSISTANT

## 2021-09-07 RX ORDER — OXYCODONE AND ACETAMINOPHEN 5; 325 MG/1; MG/1
1 TABLET ORAL EVERY 12 HOURS PRN
Qty: 6 TABLET | Refills: 0 | Status: SHIPPED | OUTPATIENT
Start: 2021-09-07 | End: 2021-09-10

## 2021-09-07 RX ORDER — METHOCARBAMOL 500 MG/1
500 TABLET, FILM COATED ORAL 4 TIMES DAILY PRN
Qty: 40 TABLET | Refills: 0 | Status: SHIPPED | OUTPATIENT
Start: 2021-09-07 | End: 2021-09-17

## 2021-09-07 RX ADMIN — METHOCARBAMOL 500 MG: 500 TABLET ORAL at 09:09

## 2021-09-07 RX ADMIN — METHOCARBAMOL 500 MG: 500 TABLET ORAL at 01:09

## 2021-09-07 RX ADMIN — ACETAMINOPHEN 650 MG: 325 TABLET ORAL at 09:09

## 2024-02-18 ENCOUNTER — HOSPITAL ENCOUNTER (EMERGENCY)
Facility: HOSPITAL | Age: 74
Discharge: HOME | End: 2024-02-18
Attending: EMERGENCY MEDICINE
Payer: MEDICARE

## 2024-02-18 VITALS
BODY MASS INDEX: 22.81 KG/M2 | DIASTOLIC BLOOD PRESSURE: 82 MMHG | WEIGHT: 150 LBS | RESPIRATION RATE: 20 BRPM | HEART RATE: 51 BPM | OXYGEN SATURATION: 100 % | SYSTOLIC BLOOD PRESSURE: 145 MMHG

## 2024-02-18 DIAGNOSIS — R42 DIZZINESS: Primary | ICD-10-CM

## 2024-02-18 LAB
ALBUMIN SERPL BCP-MCNC: 4.8 G/DL (ref 3.5–5.2)
ALP SERPL-CCNC: 98 U/L (ref 38–126)
ALT SERPL W/O P-5'-P-CCNC: 19 U/L (ref 10–44)
ANION GAP SERPL CALC-SCNC: 11 MMOL/L (ref 8–16)
AST SERPL-CCNC: 37 U/L (ref 15–46)
BASOPHILS # BLD AUTO: 0.03 K/UL (ref 0–0.2)
BASOPHILS NFR BLD: 0.8 % (ref 0–1.9)
BILIRUB SERPL-MCNC: 1.6 MG/DL (ref 0.1–1)
CALCIUM SERPL-MCNC: 10.4 MG/DL (ref 8.7–10.5)
CHLORIDE SERPL-SCNC: 103 MMOL/L (ref 95–110)
CO2 SERPL-SCNC: 21 MMOL/L (ref 23–29)
CREAT SERPL-MCNC: 1.31 MG/DL (ref 0.5–1.4)
DIFFERENTIAL METHOD BLD: ABNORMAL
EOSINOPHIL # BLD AUTO: 0.1 K/UL (ref 0–0.5)
EOSINOPHIL NFR BLD: 1.3 % (ref 0–8)
ERYTHROCYTE [DISTWIDTH] IN BLOOD BY AUTOMATED COUNT: 12.6 % (ref 11.5–14.5)
EST. GFR  (NO RACE VARIABLE): 56.8 ML/MIN/1.73 M^2
GLUCOSE SERPL-MCNC: 207 MG/DL (ref 70–110)
HCT VFR BLD AUTO: 39.6 % (ref 40–54)
HGB BLD-MCNC: 13 G/DL (ref 14–18)
IMM GRANULOCYTES # BLD AUTO: 0.01 K/UL (ref 0–0.04)
IMM GRANULOCYTES NFR BLD AUTO: 0.3 % (ref 0–0.5)
LYMPHOCYTES # BLD AUTO: 1 K/UL (ref 1–4.8)
LYMPHOCYTES NFR BLD: 25 % (ref 18–48)
MAGNESIUM SERPL-MCNC: 1.6 MG/DL (ref 1.6–2.6)
MCH RBC QN AUTO: 31.3 PG (ref 27–31)
MCHC RBC AUTO-ENTMCNC: 32.8 G/DL (ref 32–36)
MCV RBC AUTO: 95 FL (ref 82–98)
MONOCYTES # BLD AUTO: 0.4 K/UL (ref 0.3–1)
MONOCYTES NFR BLD: 10.5 % (ref 4–15)
NEUTROPHILS # BLD AUTO: 2.5 K/UL (ref 1.8–7.7)
NEUTROPHILS NFR BLD: 62.1 % (ref 38–73)
NRBC BLD-RTO: 0 /100 WBC
PLATELET # BLD AUTO: 184 K/UL (ref 150–450)
PMV BLD AUTO: 11.4 FL (ref 9.2–12.9)
POTASSIUM SERPL-SCNC: 4.5 MMOL/L (ref 3.5–5.1)
PROT SERPL-MCNC: 8.5 G/DL (ref 6–8.4)
RBC # BLD AUTO: 4.16 M/UL (ref 4.6–6.2)
SODIUM SERPL-SCNC: 135 MMOL/L (ref 136–145)
UUN UR-MCNC: 29 MG/DL (ref 2–20)
WBC # BLD AUTO: 4 K/UL (ref 3.9–12.7)

## 2024-02-18 PROCEDURE — 99900035 HC TECH TIME PER 15 MIN (STAT): Mod: ER

## 2024-02-18 PROCEDURE — 63600175 PHARM REV CODE 636 W HCPCS: Mod: ER | Performed by: EMERGENCY MEDICINE

## 2024-02-18 PROCEDURE — 99284 EMERGENCY DEPT VISIT MOD MDM: CPT | Mod: 25,ER

## 2024-02-18 PROCEDURE — 96365 THER/PROPH/DIAG IV INF INIT: CPT | Mod: ER

## 2024-02-18 PROCEDURE — 93005 ELECTROCARDIOGRAM TRACING: CPT | Mod: ER

## 2024-02-18 PROCEDURE — 85025 COMPLETE CBC W/AUTO DIFF WBC: CPT | Mod: ER | Performed by: EMERGENCY MEDICINE

## 2024-02-18 PROCEDURE — 80053 COMPREHEN METABOLIC PANEL: CPT | Mod: ER | Performed by: EMERGENCY MEDICINE

## 2024-02-18 PROCEDURE — 83735 ASSAY OF MAGNESIUM: CPT | Mod: ER | Performed by: EMERGENCY MEDICINE

## 2024-02-18 PROCEDURE — 93010 ELECTROCARDIOGRAM REPORT: CPT | Mod: ,,, | Performed by: INTERNAL MEDICINE

## 2024-02-18 RX ORDER — MAGNESIUM SULFATE 1 G/100ML
1 INJECTION INTRAVENOUS
Status: COMPLETED | OUTPATIENT
Start: 2024-02-18 | End: 2024-02-18

## 2024-02-18 RX ADMIN — MAGNESIUM SULFATE 1 G: 1 INJECTION INTRAVENOUS at 09:02

## 2024-02-18 NOTE — ED NOTES
Patient [place] to ed via EMS [place] for eval of a period of weakness. Patient states he rides his bike to the gas station every morning and has breakfast. Gas station attendants called EMS when patient reported feeling dizzy and weak. Pt denies falling. Patient has no complaints at present. Denies pain. Denies nausea. Pt able to change positions for orthostatic vitals without difficulty. Patient denies PMH. Pt reports daily ETOH use. States last drink was yesterday. Cardiac monitor placed on patient. VS stable. Will cont to monitor at this time.

## 2024-02-18 NOTE — ED NOTES
Pt ambulated around ED well. No gait abnormalities noted. Ambulates with steady and even gait. Denies dizziness. Has no complaints. MD informed.
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
## 2024-02-19 LAB
OHS QRS DURATION: 90 MS
OHS QTC CALCULATION: 409 MS

## 2025-01-20 ENCOUNTER — HOSPITAL ENCOUNTER (INPATIENT)
Facility: HOSPITAL | Age: 75
LOS: 10 days | Discharge: NURSING, REHAB OR LONG-TERM CARE | DRG: 682 | End: 2025-01-31
Attending: EMERGENCY MEDICINE | Admitting: INTERNAL MEDICINE
Payer: MEDICARE

## 2025-01-20 DIAGNOSIS — R55 SYNCOPE AND COLLAPSE: ICD-10-CM

## 2025-01-20 DIAGNOSIS — R50.9 FEVER, UNSPECIFIED FEVER CAUSE: ICD-10-CM

## 2025-01-20 DIAGNOSIS — F10.90 ALCOHOL USE DISORDER: Chronic | ICD-10-CM

## 2025-01-20 DIAGNOSIS — J69.0 ASPIRATION PNEUMONIA OF RIGHT LOWER LOBE, UNSPECIFIED ASPIRATION PNEUMONIA TYPE: ICD-10-CM

## 2025-01-20 DIAGNOSIS — R41.0 ACUTE DELIRIUM: ICD-10-CM

## 2025-01-20 DIAGNOSIS — R42 LIGHTHEADEDNESS: ICD-10-CM

## 2025-01-20 DIAGNOSIS — R74.8 ELEVATED CK: ICD-10-CM

## 2025-01-20 DIAGNOSIS — F17.200 TOBACCO USE DISORDER: Chronic | ICD-10-CM

## 2025-01-20 DIAGNOSIS — E80.6 BILIRUBINEMIA: ICD-10-CM

## 2025-01-20 DIAGNOSIS — L60.2 ONYCHOGRYPHOSIS: ICD-10-CM

## 2025-01-20 DIAGNOSIS — R55 SYNCOPE, UNSPECIFIED SYNCOPE TYPE: ICD-10-CM

## 2025-01-20 DIAGNOSIS — R07.9 CHEST PAIN: ICD-10-CM

## 2025-01-20 DIAGNOSIS — F03.918 DEMENTIA WITH BEHAVIORAL DISTURBANCE: ICD-10-CM

## 2025-01-20 DIAGNOSIS — E87.5 HYPERKALEMIA: ICD-10-CM

## 2025-01-20 DIAGNOSIS — M62.82 NON-TRAUMATIC RHABDOMYOLYSIS: ICD-10-CM

## 2025-01-20 DIAGNOSIS — G93.40 ACUTE ENCEPHALOPATHY: ICD-10-CM

## 2025-01-20 DIAGNOSIS — T68.XXXA HYPOTHERMIA: Primary | ICD-10-CM

## 2025-01-20 DIAGNOSIS — N17.9 AKI (ACUTE KIDNEY INJURY): ICD-10-CM

## 2025-01-20 LAB
ALBUMIN SERPL BCP-MCNC: 4.7 G/DL (ref 3.5–5.2)
ALP SERPL-CCNC: 66 U/L (ref 38–126)
ALT SERPL W/O P-5'-P-CCNC: 19 U/L (ref 10–44)
ANION GAP SERPL CALC-SCNC: 13 MMOL/L (ref 8–16)
AST SERPL-CCNC: 38 U/L (ref 15–46)
BASOPHILS # BLD AUTO: 0.02 K/UL (ref 0–0.2)
BASOPHILS NFR BLD: 0.6 % (ref 0–1.9)
BILIRUB SERPL-MCNC: 1.4 MG/DL (ref 0.1–1)
BILIRUB UR QL STRIP: NEGATIVE
CALCIUM SERPL-MCNC: 10.3 MG/DL (ref 8.7–10.5)
CHLORIDE SERPL-SCNC: 107 MMOL/L (ref 95–110)
CLARITY UR REFRACT.AUTO: CLEAR
CO2 SERPL-SCNC: 17 MMOL/L (ref 23–29)
COLOR UR AUTO: YELLOW
CREAT SERPL-MCNC: 1.77 MG/DL (ref 0.5–1.4)
DIFFERENTIAL METHOD BLD: ABNORMAL
EOSINOPHIL # BLD AUTO: 0 K/UL (ref 0–0.5)
EOSINOPHIL NFR BLD: 1.2 % (ref 0–8)
ERYTHROCYTE [DISTWIDTH] IN BLOOD BY AUTOMATED COUNT: 12 % (ref 11.5–14.5)
EST. GFR  (NO RACE VARIABLE): 39.3 ML/MIN/1.73 M^2
GLUCOSE SERPL-MCNC: 184 MG/DL (ref 70–110)
GLUCOSE UR QL STRIP: NEGATIVE
HCT VFR BLD AUTO: 39.8 % (ref 40–54)
HGB BLD-MCNC: 12.6 G/DL (ref 14–18)
HGB UR QL STRIP: NEGATIVE
IMM GRANULOCYTES # BLD AUTO: 0 K/UL (ref 0–0.04)
IMM GRANULOCYTES NFR BLD AUTO: 0 % (ref 0–0.5)
KETONES UR QL STRIP: NEGATIVE
LACTATE SERPL-SCNC: 1.3 MMOL/L (ref 0.5–2.2)
LACTATE SERPL-SCNC: 1.3 MMOL/L (ref 0.5–2.2)
LEUKOCYTE ESTERASE UR QL STRIP: ABNORMAL
LYMPHOCYTES # BLD AUTO: 1 K/UL (ref 1–4.8)
LYMPHOCYTES NFR BLD: 29.9 % (ref 18–48)
MAGNESIUM SERPL-MCNC: 1.7 MG/DL (ref 1.6–2.6)
MCH RBC QN AUTO: 30.1 PG (ref 27–31)
MCHC RBC AUTO-ENTMCNC: 31.7 G/DL (ref 32–36)
MCV RBC AUTO: 95 FL (ref 82–98)
MICROSCOPIC COMMENT: NORMAL
MONOCYTES # BLD AUTO: 0.3 K/UL (ref 0.3–1)
MONOCYTES NFR BLD: 9.5 % (ref 4–15)
NEUTROPHILS # BLD AUTO: 2 K/UL (ref 1.8–7.7)
NEUTROPHILS NFR BLD: 58.8 % (ref 38–73)
NITRITE UR QL STRIP: NEGATIVE
NRBC BLD-RTO: 0 /100 WBC
PH UR STRIP: 6 [PH] (ref 5–8)
PLATELET # BLD AUTO: 191 K/UL (ref 150–450)
PMV BLD AUTO: 11.4 FL (ref 9.2–12.9)
POTASSIUM SERPL-SCNC: 5.2 MMOL/L (ref 3.5–5.1)
PROT SERPL-MCNC: 8.5 G/DL (ref 6–8.4)
PROT UR QL STRIP: NEGATIVE
RBC # BLD AUTO: 4.19 M/UL (ref 4.6–6.2)
SODIUM SERPL-SCNC: 137 MMOL/L (ref 136–145)
SP GR UR STRIP: 1.02 (ref 1–1.03)
TROPONIN I SERPL-MCNC: <0.012 NG/ML (ref 0.01–0.03)
URN SPEC COLLECT METH UR: ABNORMAL
UROBILINOGEN UR STRIP-ACNC: NEGATIVE EU/DL
UUN UR-MCNC: 49 MG/DL (ref 2–20)
WBC # BLD AUTO: 3.38 K/UL (ref 3.9–12.7)
WBC #/AREA URNS AUTO: 4 /HPF (ref 0–5)

## 2025-01-20 PROCEDURE — 80053 COMPREHEN METABOLIC PANEL: CPT | Mod: ER | Performed by: EMERGENCY MEDICINE

## 2025-01-20 PROCEDURE — 93005 ELECTROCARDIOGRAM TRACING: CPT | Mod: ER

## 2025-01-20 PROCEDURE — 84484 ASSAY OF TROPONIN QUANT: CPT | Mod: ER | Performed by: EMERGENCY MEDICINE

## 2025-01-20 PROCEDURE — 94761 N-INVAS EAR/PLS OXIMETRY MLT: CPT | Mod: ER

## 2025-01-20 PROCEDURE — 85025 COMPLETE CBC W/AUTO DIFF WBC: CPT | Mod: ER | Performed by: EMERGENCY MEDICINE

## 2025-01-20 PROCEDURE — 99285 EMERGENCY DEPT VISIT HI MDM: CPT | Mod: 25,ER

## 2025-01-20 PROCEDURE — 25000003 PHARM REV CODE 250

## 2025-01-20 PROCEDURE — 93010 ELECTROCARDIOGRAM REPORT: CPT | Mod: ,,, | Performed by: STUDENT IN AN ORGANIZED HEALTH CARE EDUCATION/TRAINING PROGRAM

## 2025-01-20 PROCEDURE — G0378 HOSPITAL OBSERVATION PER HR: HCPCS

## 2025-01-20 PROCEDURE — G0378 HOSPITAL OBSERVATION PER HR: HCPCS | Mod: ER

## 2025-01-20 PROCEDURE — 83735 ASSAY OF MAGNESIUM: CPT | Mod: ER | Performed by: EMERGENCY MEDICINE

## 2025-01-20 PROCEDURE — 25000003 PHARM REV CODE 250: Mod: ER | Performed by: EMERGENCY MEDICINE

## 2025-01-20 PROCEDURE — 99900035 HC TECH TIME PER 15 MIN (STAT): Mod: ER

## 2025-01-20 PROCEDURE — 81000 URINALYSIS NONAUTO W/SCOPE: CPT | Mod: ER | Performed by: EMERGENCY MEDICINE

## 2025-01-20 PROCEDURE — 96360 HYDRATION IV INFUSION INIT: CPT | Mod: ER

## 2025-01-20 PROCEDURE — 87040 BLOOD CULTURE FOR BACTERIA: CPT | Mod: ER | Performed by: EMERGENCY MEDICINE

## 2025-01-20 PROCEDURE — 83605 ASSAY OF LACTIC ACID: CPT | Mod: 91,ER | Performed by: EMERGENCY MEDICINE

## 2025-01-20 RX ORDER — NALOXONE HCL 0.4 MG/ML
0.02 VIAL (ML) INJECTION
Status: DISCONTINUED | OUTPATIENT
Start: 2025-01-20 | End: 2025-01-31 | Stop reason: HOSPADM

## 2025-01-20 RX ORDER — THIAMINE HYDROCHLORIDE 100 MG/ML
400 INJECTION, SOLUTION INTRAMUSCULAR; INTRAVENOUS 3 TIMES DAILY
Status: DISCONTINUED | OUTPATIENT
Start: 2025-01-20 | End: 2025-01-20

## 2025-01-20 RX ORDER — TALC
6 POWDER (GRAM) TOPICAL NIGHTLY PRN
Status: DISCONTINUED | OUTPATIENT
Start: 2025-01-20 | End: 2025-01-24

## 2025-01-20 RX ORDER — IBUPROFEN 200 MG
24 TABLET ORAL
Status: DISCONTINUED | OUTPATIENT
Start: 2025-01-20 | End: 2025-01-31 | Stop reason: HOSPADM

## 2025-01-20 RX ORDER — FOLIC ACID 1 MG/1
1 TABLET ORAL DAILY
Status: DISCONTINUED | OUTPATIENT
Start: 2025-01-21 | End: 2025-01-31 | Stop reason: HOSPADM

## 2025-01-20 RX ORDER — SODIUM CHLORIDE 0.9 % (FLUSH) 0.9 %
10 SYRINGE (ML) INJECTION EVERY 12 HOURS PRN
Status: DISCONTINUED | OUTPATIENT
Start: 2025-01-20 | End: 2025-01-31 | Stop reason: HOSPADM

## 2025-01-20 RX ORDER — ACETAMINOPHEN 325 MG/1
650 TABLET ORAL EVERY 4 HOURS PRN
Status: DISCONTINUED | OUTPATIENT
Start: 2025-01-20 | End: 2025-01-31 | Stop reason: HOSPADM

## 2025-01-20 RX ORDER — IBUPROFEN 200 MG
16 TABLET ORAL
Status: DISCONTINUED | OUTPATIENT
Start: 2025-01-20 | End: 2025-01-31 | Stop reason: HOSPADM

## 2025-01-20 RX ORDER — THIAMINE HYDROCHLORIDE 100 MG/ML
400 INJECTION, SOLUTION INTRAMUSCULAR; INTRAVENOUS EVERY 8 HOURS
Status: DISPENSED | OUTPATIENT
Start: 2025-01-20 | End: 2025-01-27

## 2025-01-20 RX ORDER — THIAMINE HCL 100 MG
100 TABLET ORAL DAILY
Status: DISCONTINUED | OUTPATIENT
Start: 2025-01-21 | End: 2025-01-20

## 2025-01-20 RX ORDER — THIAMINE HCL 100 MG
100 TABLET ORAL EVERY 8 HOURS
Status: DISCONTINUED | OUTPATIENT
Start: 2025-01-27 | End: 2025-01-22

## 2025-01-20 RX ORDER — HEPARIN SODIUM 5000 [USP'U]/ML
5000 INJECTION, SOLUTION INTRAVENOUS; SUBCUTANEOUS EVERY 8 HOURS
Status: DISCONTINUED | OUTPATIENT
Start: 2025-01-21 | End: 2025-01-25

## 2025-01-20 RX ORDER — GLUCAGON 1 MG
1 KIT INJECTION
Status: DISCONTINUED | OUTPATIENT
Start: 2025-01-20 | End: 2025-01-31 | Stop reason: HOSPADM

## 2025-01-20 RX ORDER — POLYETHYLENE GLYCOL 3350 17 G/17G
17 POWDER, FOR SOLUTION ORAL DAILY
Status: DISCONTINUED | OUTPATIENT
Start: 2025-01-21 | End: 2025-01-31 | Stop reason: HOSPADM

## 2025-01-20 RX ORDER — SODIUM CHLORIDE 9 MG/ML
500 INJECTION, SOLUTION INTRAVENOUS
Status: COMPLETED | OUTPATIENT
Start: 2025-01-20 | End: 2025-01-20

## 2025-01-20 RX ORDER — SODIUM CHLORIDE 9 MG/ML
INJECTION, SOLUTION INTRAVENOUS CONTINUOUS
Status: ACTIVE | OUTPATIENT
Start: 2025-01-20 | End: 2025-01-21

## 2025-01-20 RX ADMIN — SODIUM CHLORIDE: 9 INJECTION, SOLUTION INTRAVENOUS at 11:01

## 2025-01-20 RX ADMIN — SODIUM CHLORIDE 500 ML: 9 INJECTION, SOLUTION INTRAVENOUS at 10:01

## 2025-01-20 NOTE — ED NOTES
Pt states tool cold to stand up for standing orthostatics, pt shivering and bear hugger in place with warm blankets.

## 2025-01-20 NOTE — ED PROVIDER NOTES
Encounter Date: 1/20/2025       History     Chief Complaint   Patient presents with    Dizziness     Riding bike and had sudden onset of syncope. Denies LOC. PT still reports dizziness.     74-year-old male brought in by EMS after episode of dizziness and near-syncope.  Patient was riding his bike and went inside to get some coffee when he started feeling like he was going to pass out.  He sat down on some beer.  Patient says he has a weird feeling in his chest.  He says that he does not feel as bad as he did earlier but still has a weird feeling.  No shortness of breath, abdominal pain, vomiting or diarrhea.      Review of patient's allergies indicates:  No Known Allergies  History reviewed. No pertinent past medical history.  History reviewed. No pertinent surgical history.  No family history on file.  Social History     Tobacco Use    Smoking status: Every Day     Current packs/day: 0.50     Average packs/day: 0.5 packs/day for 66.1 years (33.0 ttl pk-yrs)     Types: Cigarettes     Start date: 1959    Smokeless tobacco: Never    Tobacco comments:     smokes 3 or 4 cigarettes per day     Substance Use Topics    Alcohol use: Yes     Alcohol/week: 2.0 - 4.0 standard drinks of alcohol     Types: 2 - 4 Cans of beer per week     Comment: Pt reports he drinks gin everyday    Drug use: Not Currently     Review of Systems   Constitutional:  Negative for appetite change.   Eyes:  Negative for pain.   Respiratory:  Negative for shortness of breath.    Cardiovascular:  Positive for chest pain.   Gastrointestinal:  Negative for abdominal pain, nausea and vomiting.   Genitourinary:  Negative for frequency.   Musculoskeletal:  Negative for arthralgias and neck pain.   Neurological:  Positive for light-headedness. Negative for headaches.   Psychiatric/Behavioral:  Negative for confusion.        Physical Exam     Initial Vitals   BP Pulse Resp Temp SpO2   01/20/25 1015 01/20/25 1015 01/20/25 1015 01/20/25 1047 01/20/25 1015    129/74 75 19 (!) 94.1 °F (34.5 °C) 100 %      MAP       --                Physical Exam    Nursing note and vitals reviewed.  HENT:   Head: Atraumatic.   Eyes: Conjunctivae and EOM are normal.   Neck:   Normal range of motion.  Cardiovascular:      Exam reveals no gallop and no friction rub.       No murmur heard.  Pulmonary/Chest: Breath sounds normal. No respiratory distress. He has no wheezes. He has no rales.   Abdominal: Abdomen is soft. Bowel sounds are normal. He exhibits no distension. There is no abdominal tenderness.   Musculoskeletal:         General: No edema. Normal range of motion.      Cervical back: Normal range of motion.     Neurological: He is alert and oriented to person, place, and time. He has normal strength. No cranial nerve deficit or sensory deficit.   Skin: Skin is warm and dry.   Psychiatric: He has a normal mood and affect.         ED Course   Critical Care    Date/Time: 1/20/2025 2:17 PM    Performed by: Wayne Walters MD  Authorized by: Rich Fung MD  Direct patient critical care time: 24 minutes  Ordering / reviewing critical care time: 10 minutes  Documentation critical care time: 10 minutes  Consulting other physicians critical care time: 5 minutes  Total critical care time (exclusive of procedural time) : 49 minutes  Critical care was necessary to treat or prevent imminent or life-threatening deterioration of the following conditions: circulatory failure, CNS failure or compromise, cardiac failure and renal failure.  Critical care was time spent personally by me on the following activities: development of treatment plan with patient or surrogate, discussions with consultants, examination of patient, evaluation of patient's response to treatment, obtaining history from patient or surrogate, ordering and performing treatments and interventions, ordering and review of laboratory studies, ordering and review of radiographic studies, pulse oximetry, review of old charts and  re-evaluation of patient's condition.        Labs Reviewed   CBC W/ AUTO DIFFERENTIAL - Abnormal       Result Value    WBC 3.38 (*)     RBC 4.19 (*)     Hemoglobin 12.6 (*)     Hematocrit 39.8 (*)     MCV 95      MCH 30.1      MCHC 31.7 (*)     RDW 12.0      Platelets 191      MPV 11.4      Immature Granulocytes 0.0      Gran # (ANC) 2.0      Immature Grans (Abs) 0.00      Lymph # 1.0      Mono # 0.3      Eos # 0.0      Baso # 0.02      nRBC 0      Gran % 58.8      Lymph % 29.9      Mono % 9.5      Eosinophil % 1.2      Basophil % 0.6      Differential Method Automated     COMPREHENSIVE METABOLIC PANEL - Abnormal    Sodium 137      Potassium 5.2 (*)     Chloride 107      CO2 17 (*)     Glucose 184 (*)     BUN 49 (*)     Creatinine 1.77 (*)     Calcium 10.3      Total Protein 8.5 (*)     Albumin 4.7      Total Bilirubin 1.4 (*)     Alkaline Phosphatase 66      AST 38      ALT 19      Anion Gap 13      eGFR 39.3 (*)    URINALYSIS, REFLEX TO URINE CULTURE - Abnormal    Specimen UA Urine, Clean Catch      Color, UA Yellow      Appearance, UA Clear      pH, UA 6.0      Specific Gravity, UA 1.020      Protein, UA Negative      Glucose, UA Negative      Ketones, UA Negative      Bilirubin (UA) Negative      Occult Blood UA Negative      Nitrite, UA Negative      Urobilinogen, UA Negative      Leukocytes, UA 1+ (*)     Narrative:     Preferred Collection Type->Urine, Clean Catch  Specimen Source->Urine   TROPONIN I    Troponin I <0.012     MAGNESIUM    Magnesium 1.7     LACTIC ACID, PLASMA    Lactate (Lactic Acid) 1.3     LACTIC ACID, PLASMA    Lactate (Lactic Acid) 1.3     URINALYSIS MICROSCOPIC    WBC, UA 4      Microscopic Comment SEE COMMENT      Narrative:     Preferred Collection Type->Urine, Clean Catch  Specimen Source->Urine     EKG Readings: (Independently Interpreted)   Initial Reading: No STEMI. Rhythm: Sinus Bradycardia. Heart Rate: 51. Ectopy: No Ectopy. Conduction: Normal.     ECG Results              EKG  12-lead (Final result)        Collection Time Result Time QRS Duration OHS QTC Calculation    01/20/25 10:32:53 01/21/25 21:44:17 84 405                     Final result by Interface, Lab In [facility] (01/21/25 21:44:24)                   Narrative:    Test Reason : R42,    Vent. Rate :  51 BPM     Atrial Rate :  51 BPM     P-R Int : 120 ms          QRS Dur :  84 ms      QT Int : 440 ms       P-R-T Axes :  53  37  50 degrees    QTcB Int : 405 ms    Sinus bradycardia  Otherwise normal ECG  No previous ECGs available  Confirmed by Hubert Denise ([id number]) on 1/21/2025 9:44:16 PM    Referred By: AAAREFERRAL SELF           Confirmed By: Hubert Sam                                  Imaging Results              X-Ray Chest AP Portable (Final result)  Result time 01/20/25 10:58:02      Final result by Lewis Samano MD (01/20/25 10:58:02)                   Impression:      No acute process seen.      Electronically signed by: Lewis Samano MD  Date:    01/20/2025  Time:    10:58               Narrative:    EXAMINATION:  XR CHEST AP PORTABLE    CLINICAL HISTORY:  chest pain;    FINDINGS:  Single view of the chest.  Comparison 03/26/2024    Cardiac silhouette is normal.  The lungs demonstrate no evidence of active disease.  No evidence of pleural effusion or pneumothorax.  Bones appear intact.  Moderate degenerative changes and moderate atherosclerotic disease.  Remote right-sided rib fractures.                                       Medications   sodium chloride 0.9% flush 10 mL (10 mLs Intravenous Given 1/23/25 2056)   naloxone 0.4 mg/mL injection 0.02 mg (has no administration in time range)   glucose chewable tablet 16 g (has no administration in time range)   glucose chewable tablet 24 g (has no administration in time range)   dextrose 50% injection 12.5 g (has no administration in time range)   dextrose 50% injection 25 g (has no administration in time range)   glucagon (human recombinant) injection 1 mg  (has no administration in time range)   acetaminophen tablet 650 mg (has no administration in time range)   polyethylene glycol packet 17 g (17 g Oral Given 1/25/25 0850)   0.9% NaCl infusion ( Intravenous Stopped 1/21/25 1440)   multivitamin tablet (1 tablet Oral Given 1/25/25 0850)   folic acid tablet 1 mg (1 mg Oral Given 1/25/25 0850)   thiamine injection 400 mg (400 mg Intravenous Given 1/25/25 1328)   nicotine 21 mg/24 hr 1 patch (1 patch Transdermal Patch Applied 1/25/25 0851)   thiamine injection 200 mg (has no administration in time range)   gabapentin capsule 300 mg (300 mg Oral Given 1/25/25 0850)   cyanocobalamin tablet 1,000 mcg (1,000 mcg Oral Given 1/25/25 0850)   melatonin tablet 3 mg (3 mg Oral Not Given 1/24/25 2100)   ondansetron injection 4 mg (has no administration in time range)   lactated ringers infusion ( Intravenous New Bag 1/25/25 1240)   enoxaparin injection 30 mg (has no administration in time range)   0.9% NaCl infusion (0 mLs Intravenous Stopped 1/20/25 1200)   gabapentin capsule 300 mg (300 mg Oral Given 1/23/25 2051)   magnesium sulfate 2g in water 50mL IVPB (premix) (0 g Intravenous Stopped 1/22/25 1109)   LORazepam tablet 1 mg (1 mg Oral Given 1/24/25 1047)     Medical Decision Making  74-year-old male with near-syncope.  Vital signs unremarkable.  No focal findings on exam.  Neuro exam nonfocal.  No evidence of trauma.  Check labs, EKG and orthostatics    Amount and/or Complexity of Data Reviewed  Labs: ordered. Decision-making details documented in ED Course.  Radiology: ordered.    Risk  Prescription drug management.               ED Course as of 01/25/25 1417   Mon Jan 20, 2025   1103 CBC Auto Differential(!) [SN]   1107 Troponin I: <0.012 [SN]   1109 Magnesium : 1.7 [SN]   1109 Comprehensive Metabolic Panel(!)  REDD [SN]   1209 Lactic Acid Level: 1.3 [SN]   1209 Discussed case with Internal Medicine resident.  Patient will be admitted for further treatment of REDD and evaluatio  of syncope in the setting of syncope and bradycardia [SN]      ED Course User Index  [SN] Wayne Walters MD                           Clinical Impression:  Final diagnoses:  [R42] Lightheadedness  [T68.XXXA] Hypothermia (Primary)  [R55] Syncope, unspecified syncope type  [N17.9] REDD (acute kidney injury)          ED Disposition Condition    Observation Stable                Wayne Walters MD  01/20/25 1210       Wayne Walters MD  01/25/25 1417

## 2025-01-20 NOTE — ED NOTES
"Pt reports "I am ready to go." Informed pt we are waiting on EMS for transport, verbalized understanding.   "

## 2025-01-21 PROBLEM — R42 POSTURAL DIZZINESS WITH PRESYNCOPE: Status: ACTIVE | Noted: 2025-01-21

## 2025-01-21 PROBLEM — E87.29 INCREASED ANION GAP METABOLIC ACIDOSIS: Status: ACTIVE | Noted: 2025-01-21

## 2025-01-21 PROBLEM — E80.6 BILIRUBINEMIA: Status: ACTIVE | Noted: 2021-09-06

## 2025-01-21 PROBLEM — R55 SYNCOPE AND COLLAPSE: Status: ACTIVE | Noted: 2025-01-21

## 2025-01-21 PROBLEM — F10.90 ALCOHOL USE DISORDER: Chronic | Status: ACTIVE | Noted: 2025-01-21

## 2025-01-21 PROBLEM — R55 POSTURAL DIZZINESS WITH PRESYNCOPE: Status: ACTIVE | Noted: 2025-01-21

## 2025-01-21 PROBLEM — F17.200 TOBACCO USE DISORDER: Chronic | Status: ACTIVE | Noted: 2025-01-21

## 2025-01-21 PROBLEM — E87.5 HYPERKALEMIA: Status: ACTIVE | Noted: 2025-01-21

## 2025-01-21 LAB
ALBUMIN SERPL BCP-MCNC: 3.9 G/DL (ref 3.5–5.2)
ALP SERPL-CCNC: 75 U/L (ref 40–150)
ALT SERPL W/O P-5'-P-CCNC: 13 U/L (ref 10–44)
AMPHET+METHAMPHET UR QL: NEGATIVE
ANION GAP SERPL CALC-SCNC: 10 MMOL/L (ref 8–16)
AST SERPL-CCNC: 24 U/L (ref 10–40)
BARBITURATES UR QL SCN>200 NG/ML: NEGATIVE
BASOPHILS # BLD AUTO: 0.02 K/UL (ref 0–0.2)
BASOPHILS NFR BLD: 0.6 % (ref 0–1.9)
BENZODIAZ UR QL SCN>200 NG/ML: NEGATIVE
BILIRUB SERPL-MCNC: 1.1 MG/DL (ref 0.1–1)
BUN SERPL-MCNC: 43 MG/DL (ref 8–23)
BZE UR QL SCN: ABNORMAL
CALCIUM SERPL-MCNC: 9.8 MG/DL (ref 8.7–10.5)
CANNABINOIDS UR QL SCN: NEGATIVE
CHLORIDE SERPL-SCNC: 110 MMOL/L (ref 95–110)
CO2 SERPL-SCNC: 18 MMOL/L (ref 23–29)
CREAT SERPL-MCNC: 1.7 MG/DL (ref 0.5–1.4)
CREAT UR-MCNC: 112.7 MG/DL (ref 23–375)
CREAT UR-MCNC: 112.7 MG/DL (ref 23–375)
DIFFERENTIAL METHOD BLD: ABNORMAL
EOSINOPHIL # BLD AUTO: 0 K/UL (ref 0–0.5)
EOSINOPHIL NFR BLD: 1.3 % (ref 0–8)
ERYTHROCYTE [DISTWIDTH] IN BLOOD BY AUTOMATED COUNT: 12.3 % (ref 11.5–14.5)
EST. GFR  (NO RACE VARIABLE): 41 ML/MIN/1.73 M^2
ESTIMATED AVG GLUCOSE: 94 MG/DL (ref 68–131)
FERRITIN SERPL-MCNC: 361 NG/ML (ref 20–300)
GLUCOSE SERPL-MCNC: 85 MG/DL (ref 70–110)
HBA1C MFR BLD: 4.9 % (ref 4–5.6)
HCT VFR BLD AUTO: 34.7 % (ref 40–54)
HGB BLD-MCNC: 11 G/DL (ref 14–18)
IMM GRANULOCYTES # BLD AUTO: 0.01 K/UL (ref 0–0.04)
IMM GRANULOCYTES NFR BLD AUTO: 0.3 % (ref 0–0.5)
LYMPHOCYTES # BLD AUTO: 1 K/UL (ref 1–4.8)
LYMPHOCYTES NFR BLD: 31.8 % (ref 18–48)
MAGNESIUM SERPL-MCNC: 1.8 MG/DL (ref 1.6–2.6)
MCH RBC QN AUTO: 29.8 PG (ref 27–31)
MCHC RBC AUTO-ENTMCNC: 31.7 G/DL (ref 32–36)
MCV RBC AUTO: 94 FL (ref 82–98)
METHADONE UR QL SCN>300 NG/ML: NEGATIVE
MONOCYTES # BLD AUTO: 0.4 K/UL (ref 0.3–1)
MONOCYTES NFR BLD: 11.6 % (ref 4–15)
NEUTROPHILS # BLD AUTO: 1.7 K/UL (ref 1.8–7.7)
NEUTROPHILS NFR BLD: 54.4 % (ref 38–73)
NRBC BLD-RTO: 0 /100 WBC
OHS QRS DURATION: 84 MS
OHS QTC CALCULATION: 405 MS
OPIATES UR QL SCN: NEGATIVE
PCP UR QL SCN>25 NG/ML: NEGATIVE
PHOSPHATE SERPL-MCNC: 3.2 MG/DL (ref 2.7–4.5)
PLATELET # BLD AUTO: 151 K/UL (ref 150–450)
PMV BLD AUTO: 11.8 FL (ref 9.2–12.9)
POTASSIUM SERPL-SCNC: 5.1 MMOL/L (ref 3.5–5.1)
PROT SERPL-MCNC: 6.9 G/DL (ref 6–8.4)
RBC # BLD AUTO: 3.69 M/UL (ref 4.6–6.2)
SODIUM SERPL-SCNC: 138 MMOL/L (ref 136–145)
SODIUM UR-SCNC: 114 MMOL/L (ref 20–250)
TOXICOLOGY INFORMATION: ABNORMAL
WBC # BLD AUTO: 3.18 K/UL (ref 3.9–12.7)

## 2025-01-21 PROCEDURE — 80053 COMPREHEN METABOLIC PANEL: CPT

## 2025-01-21 PROCEDURE — 83036 HEMOGLOBIN GLYCOSYLATED A1C: CPT

## 2025-01-21 PROCEDURE — 80307 DRUG TEST PRSMV CHEM ANLYZR: CPT

## 2025-01-21 PROCEDURE — 82746 ASSAY OF FOLIC ACID SERUM: CPT

## 2025-01-21 PROCEDURE — 83735 ASSAY OF MAGNESIUM: CPT

## 2025-01-21 PROCEDURE — 82728 ASSAY OF FERRITIN: CPT

## 2025-01-21 PROCEDURE — 96372 THER/PROPH/DIAG INJ SC/IM: CPT

## 2025-01-21 PROCEDURE — 11000001 HC ACUTE MED/SURG PRIVATE ROOM

## 2025-01-21 PROCEDURE — 84300 ASSAY OF URINE SODIUM: CPT

## 2025-01-21 PROCEDURE — 63600175 PHARM REV CODE 636 W HCPCS

## 2025-01-21 PROCEDURE — 84100 ASSAY OF PHOSPHORUS: CPT

## 2025-01-21 PROCEDURE — S4991 NICOTINE PATCH NONLEGEND: HCPCS

## 2025-01-21 PROCEDURE — 82607 VITAMIN B-12: CPT

## 2025-01-21 PROCEDURE — 85025 COMPLETE CBC W/AUTO DIFF WBC: CPT

## 2025-01-21 PROCEDURE — 25000003 PHARM REV CODE 250

## 2025-01-21 PROCEDURE — 83540 ASSAY OF IRON: CPT

## 2025-01-21 RX ORDER — GABAPENTIN 300 MG/1
300 CAPSULE ORAL 3 TIMES DAILY
Status: COMPLETED | OUTPATIENT
Start: 2025-01-21 | End: 2025-01-23

## 2025-01-21 RX ORDER — GABAPENTIN 400 MG/1
400 CAPSULE ORAL 3 TIMES DAILY
Status: DISCONTINUED | OUTPATIENT
Start: 2025-01-21 | End: 2025-01-21

## 2025-01-21 RX ORDER — LORAZEPAM 1 MG/1
2 TABLET ORAL EVERY 4 HOURS PRN
Status: DISCONTINUED | OUTPATIENT
Start: 2025-01-21 | End: 2025-01-21

## 2025-01-21 RX ORDER — IBUPROFEN 200 MG
1 TABLET ORAL DAILY
Status: DISCONTINUED | OUTPATIENT
Start: 2025-01-21 | End: 2025-01-31 | Stop reason: HOSPADM

## 2025-01-21 RX ORDER — IBUPROFEN 200 MG
1 TABLET ORAL DAILY
Status: DISCONTINUED | OUTPATIENT
Start: 2025-01-21 | End: 2025-01-21

## 2025-01-21 RX ADMIN — FOLIC ACID 1 MG: 1 TABLET ORAL at 09:01

## 2025-01-21 RX ADMIN — NICOTINE 1 PATCH: 21 PATCH, EXTENDED RELEASE TRANSDERMAL at 04:01

## 2025-01-21 RX ADMIN — HEPARIN SODIUM 5000 UNITS: 5000 INJECTION INTRAVENOUS; SUBCUTANEOUS at 05:01

## 2025-01-21 RX ADMIN — LORAZEPAM 2 MG: 1 TABLET ORAL at 03:01

## 2025-01-21 RX ADMIN — THIAMINE HYDROCHLORIDE 400 MG: 100 INJECTION, SOLUTION INTRAMUSCULAR; INTRAVENOUS at 09:01

## 2025-01-21 RX ADMIN — THIAMINE HYDROCHLORIDE 400 MG: 100 INJECTION, SOLUTION INTRAMUSCULAR; INTRAVENOUS at 01:01

## 2025-01-21 RX ADMIN — GABAPENTIN 300 MG: 300 CAPSULE ORAL at 09:01

## 2025-01-21 RX ADMIN — THERA TABS 1 TABLET: TAB at 09:01

## 2025-01-21 RX ADMIN — SODIUM CHLORIDE: 9 INJECTION, SOLUTION INTRAVENOUS at 09:01

## 2025-01-21 RX ADMIN — HEPARIN SODIUM 5000 UNITS: 5000 INJECTION INTRAVENOUS; SUBCUTANEOUS at 01:01

## 2025-01-21 RX ADMIN — GABAPENTIN 300 MG: 300 CAPSULE ORAL at 02:01

## 2025-01-21 RX ADMIN — POLYETHYLENE GLYCOL 3350 17 G: 17 POWDER, FOR SOLUTION ORAL at 09:01

## 2025-01-21 RX ADMIN — HEPARIN SODIUM 5000 UNITS: 5000 INJECTION INTRAVENOUS; SUBCUTANEOUS at 09:01

## 2025-01-21 RX ADMIN — THIAMINE HYDROCHLORIDE 400 MG: 100 INJECTION, SOLUTION INTRAMUSCULAR; INTRAVENOUS at 05:01

## 2025-01-21 NOTE — PROGRESS NOTES
The sw attempted to complete the assessment on the pt but he's not oriented. The sw left a message for the pt's son Bozena Terrazas [phone] to return the call to complete the assessment.

## 2025-01-21 NOTE — NURSING
PER CHARGE NURSE CODE WHITE CALLED.  PT AGITATED AND ANXIOUS PULLED OUT IV, REFUSING TO STAY IN BED, REFUSING MEDICATIONS, AND NON REDIRECTABLE.  HOUSE SUPERVISOR, SECURITY, CHARGE NURSE, PT'S COVERING PHYSICIAN, AND OTHER NURSING STAFF PRESENT.  PRN ATIVAN ALREADY GIVEN PER CIWA ORDERS.

## 2025-01-21 NOTE — PROGRESS NOTES
"[facility] Medicine Progress Note    Admitting Team: \[facility]\"" Hospitalist Team A  Attending Physician: Stephanie Adhikari MD  Resident: Dru  Intern: Louann     Date of Admit: 1/20/2025    Subjective/Interval History      Patient seen and examined this morning.  Patient disoriented on exam, unsure why he is in the hospital and reports the year is "265".  Cannot recall events leading up to hospitalization.  Continuing IV Thiamine for Wernicke's treatment; starting Gabapentin Taper for alcohol withdrawals.      Objective     Physical Examination:  BP (!) 148/69   Pulse 62   Temp 98 °F (36.7 °C)   Resp 18   Ht 5' 8" (1.727 m)   Wt 59.2 kg (130 lb 8.2 oz)   SpO2 95%   BMI 19.84 kg/m²    Physical Exam  Vitals and nursing note reviewed.   Constitutional:       Appearance: Normal appearance.      Comments: Disoriented on exam   HENT:      Head: Normocephalic.      Nose: Nose normal.      Mouth/Throat:      Mouth: Mucous membranes are moist.   Eyes:      Extraocular Movements: Extraocular movements intact.      Pupils: Pupils are equal, round, and reactive to light.   Cardiovascular:      Rate and Rhythm: Normal rate and regular rhythm.      Pulses: Normal pulses.      Heart sounds: Normal heart sounds.   Pulmonary:      Effort: Pulmonary effort is normal.      Breath sounds: Normal breath sounds.   Abdominal:      General: Abdomen is flat.      Palpations: Abdomen is soft.   Musculoskeletal:      Comments: Patient in soft restraints   Skin:     General: Skin is warm.      Capillary Refill: Capillary refill takes less than 2 seconds.   Neurological:      General: No focal deficit present.      Mental Status: Mental status is at baseline. He is disoriented.   Psychiatric:         Mood and Affect: Mood is anxious.         Thought Content: Thought content is delusional.       Intake/Output:    Intake/Output Summary (Last 24 hours) at 1/21/2025 0917  Last data filed at 1/21/2025 0916  Gross per 24 hour   Intake 0 " ml   Output 200 ml   Net -200 ml     Net IO Since Admission: -200 mL [01/21/25 0917]    Laboratory data: reviewed     Microbiology data: reviewed   Blood cultures drawn    EKG data: reviewed   Sinus Bradycardia     Radiology data: reviewed   CT Head Without Contrast   Final Result      1. No CT evidence of acute intracranial abnormality. Clinical correlation and further evaluation as warranted.   2. Generalized cerebral volume loss and findings suggestive of chronic microvascular ischemic change.   3. Paranasal sinus disease as above.         Electronically signed by: Paloma High MD   Date:    01/21/2025   Time:    01:22      X-Ray Chest AP Portable   Final Result      No acute process seen.         Electronically signed by: Lewis Samano MD   Date:    01/20/2025   Time:    10:58      US Liver with Doppler (xpd)    (Results Pending)         Current Medications:     Infusions:   0.9% NaCl   Intravenous Continuous 125 mL/hr at 01/20/25 2315 New Bag at 01/20/25 2315        Scheduled:   folic acid  1 mg Oral Daily    gabapentin  300 mg Oral TID    heparin (porcine)  5,000 Units Subcutaneous Q8H    multivitamin  1 tablet Oral Daily    nicotine  1 patch Transdermal Daily    polyethylene glycol  17 g Oral Daily    thiamine (B-1) injection  400 mg Intravenous Q8H    Followed by    [START ON 1/22/2025] thiamine  100 mg Oral Q8H        PRN:    Current Facility-Administered Medications:     acetaminophen, 650 mg, Oral, Q4H PRN    dextrose 50%, 12.5 g, Intravenous, PRN    dextrose 50%, 25 g, Intravenous, PRN    glucagon (human recombinant), 1 mg, Intramuscular, PRN    glucose, 16 g, Oral, PRN    glucose, 24 g, Oral, PRN    melatonin, 6 mg, Oral, Nightly PRN    naloxone, 0.02 mg, Intravenous, PRN    sodium chloride 0.9%, 10 mL, Intravenous, Q12H PRN      Assessment/Plan:     Solomon Terrazas is a 74 y.o. male with a PMHx of Tobacco Use disorder and Alcohol Use disorder. The patient presented on 1/20/2025 for REDD and Presyncope  now admitted for IV hydration and further workup.     Presyncope vs Syncope  - Unclear story; patient's story does not reveal any true LOC  - Suspect vasovagal etiology potentially related to dehydration; however unclear  - May be due to alcohol intoxication as patient has history of significant alcohol use  - CT Head negative for acute abnormalities   - TTE ordered   - Continue to monitor on telemetry  - Continue to workup for cause of syncope      Acute Kidney Injury  - Creatinine of 1.7 on admission; appears baseline is 1.3  - Urine studies ordered to evaluate etiology   -likely 2/2 to pre-renal etiology  -started on NS at rate 125cc/hr  - Holding nephrotoxic meds; Monitor with daily CMP     Hyperkalemia  -mild at 5.2 w/o evidence of EKG changes  -will repeat cmp in morning to see if this corrects with hydration  - Will shift if needed      Alcohol use disorder  - Patient has well documented history of heavy alcohol use  - Patient denies drinking prior to events leading up to hospitalization; however unreliable historian given delirium   - ciwa precautions in place; patient got one dose of 2 mg ativan due concern for withdrawal  - Initiated Gabapentin taper for alcohol withdrawals  - Started IV Thiamine replacement due to concern for Wernicke's encephalopathy      Tobacco Use Disorder  - patient on smoking cessation  -nicotine patches while inpatient.       Healthcare maintenance   -primary care provider: None     Diet: Adult regular   VTE PPx: Heparin  Code Status: Full Code    Dispo: Pending clinical improvement; treatment for alcohol withdrawals   Estimated LOS: 2-4 days      Jaime Gonsales MD  [employer] Internal Medicine PGY-2

## 2025-01-21 NOTE — PLAN OF CARE
Problem: Adult Inpatient Plan of Care  Goal: Plan of Care Review  Outcome: Progressing     Vital signs, lab, progress notes and care plan reviewed.

## 2025-01-21 NOTE — ED NOTES
Educated pt on importance of keeping EKG monitoring on. Pt refused to keep leads on , pt pulled them off each time the leads was placed back on .

## 2025-01-21 NOTE — PLAN OF CARE
Pt resting in bed .   Pt awake and alert .   Hob elevated .   Restraints in place   Vitals stable .   POC discussed .   IV intact   Safety Measures in place   Chart check complete .   Will  continue to monitor .

## 2025-01-21 NOTE — H&P
[facility] Medicine H&P Note     Admitting Team: [facility] Hospitalist Team A  Attending Physician: Stephanie Adhikari MD  Resident: Dru  Intern: Louann    Date of Admit: 1/20/2025    Chief Complaint     Weakness x 1 episode    Subjective:      History of Present Illness:  Solomon Terrazas is a 74 y.o. male with a PMHx of Tobacco Use disorder and Alcohol Use disorder. The patient presented on 1/20/2025 for evaluation of new onset weakness/dizziness.    Patient unable to recall the events from this morning stating they occurred too long ago that he doesn't truly remember. Said he feels fine now and is not sure why he is in the hospital. Unable to recall a episode of LOC but not able to fully recall any event besides being picked up by the hospital. Per OSH patient was riding his bike and went inside to get some coffee when he started feeling like he was going to pass out.     He currently denies chest pain, palpitations nausea, vomiting, diarrhea, shortness of breath, abdominal pain,  hematuria, hematemesis, melena/hematochezia, auditory or visual hallucinations.       Past Medical History:  History reviewed. No pertinent past medical history.  See HPI    Past Surgical History:  History reviewed. No pertinent surgical history.  Unable to recall any surgeries    Social History:  Tobacco: 1/2 pack/ day for 60 years  Alcohol: 1 pint of gin / day  Drugs: None    Family History:  No family history on file.    Home Medications:  None    Allergies:  Review of patient's allergies indicates:  No Known Allergies    Review of Systems:  ROS  Pertinent positives and negatives noted in HPI    Health Care Maintenance :   Primary Care Physician: No, Primary Doctor     Immunizations:   Immunization History   Administered Date(s) Administered    Tdap 06/08/2020, 09/06/2021        Cancer Screening:  Colonoscopy: Not up to date.  CT chest next due on 3/25  Abdominal Ultrasound not up to date    Objective:   Last 24 Hour Vital  "Signs:  BP  Min: 113/70  Max: 145/67  Temp  Av.6 °F (35.9 °C)  Min: 94.1 °F (34.5 °C)  Max: 98.6 °F (37 °C)  Pulse  Av.8  Min: 50  Max: 80  Resp  Av.8  Min: 18  Max: 21  SpO2  Av.4 %  Min: 93 %  Max: 100 %  Height  Av' 8" (172.7 cm)  Min: 5' 8" (172.7 cm)  Max: 5' 8" (172.7 cm)  Weight  Av.2 kg (150 lb 4.1 oz)  Min: 59.2 kg (130 lb 8.2 oz)  Max: 77.1 kg (170 lb)  Body mass index is 19.84 kg/m².  I/O last 3 completed shifts:  In: -   Out: 200 [Urine:200]    Physical Examination:  Physical Exam  Vitals and nursing note reviewed.   Constitutional:       Appearance: Normal appearance.   HENT:      Head: Normocephalic.   Eyes:      General: No scleral icterus.        Right eye: No discharge.         Left eye: No discharge.   Cardiovascular:      Rate and Rhythm: Normal rate and regular rhythm.      Heart sounds: Normal heart sounds.      No friction rub.   Pulmonary:      Effort: Pulmonary effort is normal.      Breath sounds: Normal breath sounds. No wheezing or rales.   Abdominal:      General: Abdomen is flat.      Palpations: Abdomen is soft.      Tenderness: There is no guarding.   Musculoskeletal:         General: Normal range of motion.      Cervical back: Normal range of motion and neck supple.      Right lower leg: No edema.      Left lower leg: No edema.   Skin:     General: Skin is warm and dry.      Coloration: Skin is not jaundiced.   Neurological:      General: No focal deficit present.      Mental Status: He is alert and oriented to person, place, and time.      Gait: Gait normal.        Laboratory:  Most Recent Data:  CBC:   Lab Results   Component Value Date    WBC 3.38 (L) 2025    HGB 12.6 (L) 2025    HCT 39.8 (L) 2025     2025    MCV 95 2025    RDW 12.0 2025     BMP:   Lab Results   Component Value Date     2025    K 5.2 (H) 2025     2025    CO2 17 (L) 2025    BUN 49 (H) 2025    CREATININE " 1.77 (H) 01/20/2025     (H) 01/20/2025    CALCIUM 10.3 01/20/2025    MG 1.7 01/20/2025    PHOS 2.4 (L) 09/07/2021     LFTs:   Lab Results   Component Value Date    PROT 8.5 (H) 01/20/2025    ALBUMIN 4.7 01/20/2025    BILITOT 1.4 (H) 01/20/2025    AST 38 01/20/2025    ALKPHOS 66 01/20/2025    ALT 19 01/20/2025     Coags:   Lab Results   Component Value Date    INR 0.8 (L) 03/26/2024     FLP:   Lab Results   Component Value Date    CHOL 207 (H) 03/26/2024     (H) 03/26/2024    LDLCALC 85 03/26/2024    TRIG 100 03/26/2024    CHOLHDL 2.03 03/26/2024     DM:   Lab Results   Component Value Date    HGBA1C 5.3 03/27/2024    HGBA1C 4.7 09/07/2021    LDLCALC 85 03/26/2024    CREATININE 1.77 (H) 01/20/2025     Thyroid:   Lab Results   Component Value Date    TSH 1.60 03/26/2024     Anemia:   Lab Results   Component Value Date    IRON 42 (L) 09/07/2021    TIBC 308 09/07/2021    FERRITIN 633 (H) 09/07/2021    JAMYHIHB34 431 09/07/2021    FOLATE 9.2 09/07/2021     Cardiac:   Lab Results   Component Value Date    TROPONINI <0.012 01/20/2025     Urinalysis:   Lab Results   Component Value Date    COLORU Yellow 01/20/2025    SPECGRAV 1.020 01/20/2025    NITRITE Negative 01/20/2025    KETONESU Negative 01/20/2025    UROBILINOGEN Negative 01/20/2025    WBCUA 4 01/20/2025       Microbiology Data:   None    Radiology:  Chest XR (my interpretation): No acute cardiac or pulmonary process or evidence of pneumothorax. 1/20/25    Other Results:  EKG (my interpretation): Normal sinus rhythm unchanced from prior ekg    Assessment & Plan:   Solomon Terrazas is a 74 y.o. male with a PMHx of Tobacco Use disorder and Alcohol Use disorder. The patient presented on 1/20/2025 for REDD and Presyncope now admitted for IV hydration and further workup.     Presyncope vs Syncope  -unclear story; patient's story does not reveal any true LOC  -suspect vasovagal etiology potentially related to dehydration  -continue to monitor on  telemetry    Acute Kidney Injury  -fena ordered  -likely 2/2 to pre-renal etiology  -started on NS at rate 125cc/hr    Hyperkalemia  -mild at 5.2 w/o evidence of EKG changes  -will repeat cmp in morning to see if this corrects with hydration    Alcohol use disorder  -ciwa precautions in place w/ ativan prn for CIWA>8 or VS abnormalities    Tobacco Use Disorder  - patient on smoking cessation  -nicotine patches while inpatient.       PPx: Heparin  Diet: Adult  Code: Full    Disposition: Pending resolution of kari      Mak Rdz MD  [facility], -III    [facility] Medicine Hospitalist Pager numbers:   [facility] Hospitalist Medicine Team A (Danelle/Zeynep): [phone]  [facility] Hospitalist Medicine Team B (Madan/Kenton):  [phone]

## 2025-01-21 NOTE — NURSING
PT REFUSE TELEMETRY MONITORING, MEDICATION, AND IV FLUIDS.  ON CALL PROVIDER (EV) NOTIFIED BY THIS NURSE (RM).

## 2025-01-21 NOTE — PLAN OF CARE
"VIRTUAL NURSE:  Cued into patient's room.  Permission received per patient to turn camera to view patient.  Introduced as VN for night shift that will be working with floor nurse and nursing assistant.  Educated patient on VN's role in patient care and  [facility] model.  Plan of care reviewed with patient and son Seals.  Education per flowsheet.   Informed patient/son  that staff will round on them every 2 hours but to use call light for any other needs they may have; informed of fall risk and fall precautions.  Patient verbalized understanding.  Call light within reach; bed siderails up x3.  Opportunity given for questions and questions answered.  Admission assessment questions answered.  Patient denies complaints or any needs at this time. Instructed to call for assistance.  Will cont to monitor and intervene as needed.    Labs, notes, orders, and careplan initiated.   Problem: Adult Inpatient Plan of Care  Goal: Plan of Care Review  Outcome: Progressing  Goal: Patient-Specific Goal (Individualized)  Outcome: Progressing         01/20/25 3188   Patient Request   Patient Requested 419 - Admission profile is completed.   FYI - Son says pt drinks 1pint of cheap gin everyday. Watch for s/s withdrawals if he's here a couple of days. Independent usually & lives alone. Son say he will probably be a handful b/c "the man hates all hospitals"   Nurse Notification   Bedside Nurse Notified? Yes   Name of Bedside Nurse Joselo   Nurse Notfication Method Secure Chat   Nurse Notified Of Patient Request;Other  (Admission is completed)   Admission   Initial VN Admission Questions Complete   Communication Issues? None   Shift   Virtual Nurse - Rounding Complete   Virtual Nurse - Patient Verbalized Approval Of Camera Use;VN Rounding   Type of Frequent Check   Type Patient Rounds;Telemetry Monitoring   Safety/Activity   Patient Rounds bed in low position;placement of personal items at bedside;call light in patient/parent reach;clutter " free environment maintained;visualized patient   Safety Promotion/Fall Prevention Fall Risk reviewed with patient/family;medications reviewed;instructed to call staff for mobility   Positioning   Body Position position maintained;neutral head position;neutral body alignment   Head of Bed (HOB) Positioning HOB at 20-30 degrees   Pain/Comfort/Sleep   Preferred Pain Scale number (Numeric Rating Pain Scale)   Comfort/Acceptable Pain Level 0   Pain Rating (0-10): Rest 0   Pain Rating (0-10): Activity 0   Cardiac   Cardiac/Telemetry Monitor On Yes

## 2025-01-21 NOTE — PLAN OF CARE
Problem: Adult Inpatient Plan of Care  Goal: Plan of Care Review  Outcome: Progressing  Goal: Patient-Specific Goal (Individualized)  Outcome: Progressing  Goal: Absence of Hospital-Acquired Illness or Injury  Outcome: Progressing  Goal: Optimal Comfort and Wellbeing  Outcome: Progressing  Goal: Readiness for Transition of Care  Outcome: Progressing     Problem: Acute Kidney Injury/Impairment  Goal: Fluid and Electrolyte Balance  Outcome: Progressing  Goal: Improved Oral Intake  Outcome: Progressing  Goal: Effective Renal Function  Outcome: Progressing     Problem: Excessive Substance Use  Goal: Optimized Energy Level (Excessive Substance Use)  Outcome: Progressing  Goal: Improved Behavioral Control (Excessive Substance Use)  Outcome: Progressing  Goal: Increased Participation and Engagement (Excessive Substance Use)  Outcome: Progressing  Goal: Improved Physiologic Symptoms (Excessive Substance Use)  Outcome: Progressing  Goal: Enhanced Social, Occupational or Functional Skills (Excessive Substance Use)  Outcome: Progressing     Problem: Syncope  Goal: Absence of Syncopal Symptoms  Outcome: Progressing     Problem: Fall Injury Risk  Goal: Absence of Fall and Fall-Related Injury  Outcome: Progressing     Problem: Fatigue  Goal: Improved Activity Tolerance  Outcome: Progressing

## 2025-01-22 LAB
ALBUMIN SERPL BCP-MCNC: 3.9 G/DL (ref 3.5–5.2)
ALP SERPL-CCNC: 82 U/L (ref 40–150)
ALT SERPL W/O P-5'-P-CCNC: 14 U/L (ref 10–44)
ANION GAP SERPL CALC-SCNC: 11 MMOL/L (ref 8–16)
AST SERPL-CCNC: 25 U/L (ref 10–40)
BASOPHILS # BLD AUTO: 0.01 K/UL (ref 0–0.2)
BASOPHILS NFR BLD: 0.2 % (ref 0–1.9)
BILIRUB SERPL-MCNC: 1 MG/DL (ref 0.1–1)
BUN SERPL-MCNC: 32 MG/DL (ref 8–23)
CALCIUM SERPL-MCNC: 10.3 MG/DL (ref 8.7–10.5)
CHLORIDE SERPL-SCNC: 110 MMOL/L (ref 95–110)
CO2 SERPL-SCNC: 17 MMOL/L (ref 23–29)
CREAT SERPL-MCNC: 1.5 MG/DL (ref 0.5–1.4)
DIFFERENTIAL METHOD BLD: ABNORMAL
EOSINOPHIL # BLD AUTO: 0 K/UL (ref 0–0.5)
EOSINOPHIL NFR BLD: 0.8 % (ref 0–8)
ERYTHROCYTE [DISTWIDTH] IN BLOOD BY AUTOMATED COUNT: 11.9 % (ref 11.5–14.5)
EST. GFR  (NO RACE VARIABLE): 48 ML/MIN/1.73 M^2
FIO2: 21 %
GLUCOSE SERPL-MCNC: 91 MG/DL (ref 70–110)
HCT VFR BLD AUTO: 37.9 % (ref 40–54)
HCT VFR BLD CALC: 36.3 % (ref 36–54)
HGB BLD-MCNC: 11.8 G/DL (ref 9–18)
HGB BLD-MCNC: 11.9 G/DL (ref 14–18)
IMM GRANULOCYTES # BLD AUTO: 0.01 K/UL (ref 0–0.04)
IMM GRANULOCYTES NFR BLD AUTO: 0.2 % (ref 0–0.5)
LYMPHOCYTES # BLD AUTO: 0.8 K/UL (ref 1–4.8)
LYMPHOCYTES NFR BLD: 16.2 % (ref 18–48)
MAGNESIUM SERPL-MCNC: 1.7 MG/DL (ref 1.6–2.6)
MCH RBC QN AUTO: 29.5 PG (ref 27–31)
MCHC RBC AUTO-ENTMCNC: 31.4 G/DL (ref 32–36)
MCV RBC AUTO: 94 FL (ref 82–98)
MONOCYTES # BLD AUTO: 0.5 K/UL (ref 0.3–1)
MONOCYTES NFR BLD: 9.4 % (ref 4–15)
NEUTROPHILS # BLD AUTO: 3.6 K/UL (ref 1.8–7.7)
NEUTROPHILS NFR BLD: 73.2 % (ref 38–73)
NRBC BLD-RTO: 0 /100 WBC
PCO2 BLDA: 42 MMHG (ref 35–45)
PH SMN: 7.34 [PH] (ref 7.35–7.45)
PHOSPHATE SERPL-MCNC: 3.5 MG/DL (ref 2.7–4.5)
PLATELET # BLD AUTO: 192 K/UL (ref 150–450)
PMV BLD AUTO: 11 FL (ref 9.2–12.9)
PO2 BLDA: 27 MMHG (ref 40–60)
POC BASE DEFICIT: -3.1 MMOL/L (ref -2–2)
POC HCO3: 22.6 MMOL/L (ref 24–28)
POC IONIZED CALCIUM: 1.36 MMOL/L (ref 1.06–1.42)
POC PERFORMED BY: ABNORMAL
POC SATURATED O2: 47.7 % (ref 95–100)
POTASSIUM BLD-SCNC: 4.8 MMOL/L (ref 3.5–5.1)
POTASSIUM SERPL-SCNC: 4.8 MMOL/L (ref 3.5–5.1)
PROT SERPL-MCNC: 7.4 G/DL (ref 6–8.4)
RBC # BLD AUTO: 4.03 M/UL (ref 4.6–6.2)
SODIUM BLD-SCNC: 141 MMOL/L (ref 136–145)
SODIUM SERPL-SCNC: 138 MMOL/L (ref 136–145)
SPECIMEN SOURCE: ABNORMAL
WBC # BLD AUTO: 4.87 K/UL (ref 3.9–12.7)

## 2025-01-22 PROCEDURE — 63600175 PHARM REV CODE 636 W HCPCS

## 2025-01-22 PROCEDURE — 83735 ASSAY OF MAGNESIUM: CPT

## 2025-01-22 PROCEDURE — 84100 ASSAY OF PHOSPHORUS: CPT

## 2025-01-22 PROCEDURE — 25000003 PHARM REV CODE 250

## 2025-01-22 PROCEDURE — S4991 NICOTINE PATCH NONLEGEND: HCPCS

## 2025-01-22 PROCEDURE — 36415 COLL VENOUS BLD VENIPUNCTURE: CPT

## 2025-01-22 PROCEDURE — 80053 COMPREHEN METABOLIC PANEL: CPT

## 2025-01-22 PROCEDURE — 11000001 HC ACUTE MED/SURG PRIVATE ROOM

## 2025-01-22 PROCEDURE — 85025 COMPLETE CBC W/AUTO DIFF WBC: CPT

## 2025-01-22 RX ORDER — MAGNESIUM SULFATE HEPTAHYDRATE 40 MG/ML
2 INJECTION, SOLUTION INTRAVENOUS ONCE
Status: COMPLETED | OUTPATIENT
Start: 2025-01-22 | End: 2025-01-22

## 2025-01-22 RX ORDER — THIAMINE HYDROCHLORIDE 100 MG/ML
200 INJECTION, SOLUTION INTRAMUSCULAR; INTRAVENOUS DAILY
Status: DISCONTINUED | OUTPATIENT
Start: 2025-01-28 | End: 2025-01-28

## 2025-01-22 RX ADMIN — MAGNESIUM SULFATE HEPTAHYDRATE 2 G: 40 INJECTION, SOLUTION INTRAVENOUS at 09:01

## 2025-01-22 RX ADMIN — HEPARIN SODIUM 5000 UNITS: 5000 INJECTION INTRAVENOUS; SUBCUTANEOUS at 09:01

## 2025-01-22 RX ADMIN — GABAPENTIN 300 MG: 300 CAPSULE ORAL at 09:01

## 2025-01-22 RX ADMIN — THIAMINE HYDROCHLORIDE 400 MG: 100 INJECTION, SOLUTION INTRAMUSCULAR; INTRAVENOUS at 06:01

## 2025-01-22 RX ADMIN — THIAMINE HYDROCHLORIDE 400 MG: 100 INJECTION, SOLUTION INTRAMUSCULAR; INTRAVENOUS at 09:01

## 2025-01-22 RX ADMIN — GABAPENTIN 300 MG: 300 CAPSULE ORAL at 04:01

## 2025-01-22 RX ADMIN — POLYETHYLENE GLYCOL 3350 17 G: 17 POWDER, FOR SOLUTION ORAL at 09:01

## 2025-01-22 RX ADMIN — THIAMINE HYDROCHLORIDE 400 MG: 100 INJECTION, SOLUTION INTRAMUSCULAR; INTRAVENOUS at 04:01

## 2025-01-22 RX ADMIN — HEPARIN SODIUM 5000 UNITS: 5000 INJECTION INTRAVENOUS; SUBCUTANEOUS at 04:01

## 2025-01-22 RX ADMIN — NICOTINE 1 PATCH: 21 PATCH, EXTENDED RELEASE TRANSDERMAL at 09:01

## 2025-01-22 RX ADMIN — THERA TABS 1 TABLET: TAB at 09:01

## 2025-01-22 RX ADMIN — HEPARIN SODIUM 5000 UNITS: 5000 INJECTION INTRAVENOUS; SUBCUTANEOUS at 06:01

## 2025-01-22 RX ADMIN — FOLIC ACID 1 MG: 1 TABLET ORAL at 09:01

## 2025-01-22 NOTE — PLAN OF CARE
Pt resting in bed .   Pt awake and alert .   Hob elevated .   Vitals stable .   Restraints in place   POC discussed .   IV intact   Safety Measures in place   Chart check complete .   Will  continue to monitor .

## 2025-01-22 NOTE — PROGRESS NOTES
"[facility] Medicine Progress Note    Admitting Team: [facility] [place] Hospitalist Team A  Attending Physician: Stephanie Adhikari MD  Resident: Dru  Intern: Louann     Date of Admit: 1/20/2025    Subjective/Interval History      Patient seen and examined this morning.  Patient appears somewhat more oriented on exam.  Patient remains in restraints as he pulled out two peripheral IV overnight.  Continuing to treat with Gabapentin taper and IV Thiamine for Alcohol use/Wernicke's Encephalopathy concerns.       Objective     Physical Examination:  BP (!) 148/85   Pulse 75   Temp 98.1 °F (36.7 °C)   Resp 18   Ht 5' 8" (1.727 m)   Wt 59 kg (130 lb 1.1 oz)   SpO2 98%   BMI 19.78 kg/m²    Physical Exam  Vitals and nursing note reviewed.   Constitutional:       Appearance: Normal appearance.      Comments: Disoriented on exam   HENT:      Head: Normocephalic.      Nose: Nose normal.      Mouth/Throat:      Mouth: Mucous membranes are moist.   Eyes:      Extraocular Movements: Extraocular movements intact.      Pupils: Pupils are equal, round, and reactive to light.   Cardiovascular:      Rate and Rhythm: Normal rate and regular rhythm.      Pulses: Normal pulses.      Heart sounds: Normal heart sounds.   Pulmonary:      Effort: Pulmonary effort is normal.      Breath sounds: Normal breath sounds.   Abdominal:      General: Abdomen is flat.      Palpations: Abdomen is soft.   Musculoskeletal:      Comments: Patient in soft restraints   Skin:     General: Skin is warm.      Capillary Refill: Capillary refill takes less than 2 seconds.   Neurological:      General: No focal deficit present.      Mental Status: Mental status is at baseline. He is disoriented.   Psychiatric:         Mood and Affect: Mood is anxious.         Thought Content: Thought content is delusional.       Intake/Output:    Intake/Output Summary (Last 24 hours) at 1/22/2025 0992  Last data filed at 1/22/2025 0908  Gross per 24 hour   Intake 1348.92 ml "   Output --   Net 1348.92 ml     Net IO Since Admission: 1,148.92 mL [01/22/25 0929]    Laboratory data: reviewed     Microbiology data: reviewed   Blood cultures NG2D    EKG data: reviewed   Sinus Bradycardia     Radiology data: reviewed   US Liver with Doppler (xpd)   Final Result      No definite acute sonographic findings.      Additional details, as provided in the body of report.         Electronically signed by: Lorne Coleman   Date:    01/21/2025   Time:    12:52      CT Head Without Contrast   Final Result      1. No CT evidence of acute intracranial abnormality. Clinical correlation and further evaluation as warranted.   2. Generalized cerebral volume loss and findings suggestive of chronic microvascular ischemic change.   3. Paranasal sinus disease as above.         Electronically signed by: Paloma High MD   Date:    01/21/2025   Time:    01:22      X-Ray Chest AP Portable   Final Result      No acute process seen.         Electronically signed by: Lewis Samano MD   Date:    01/20/2025   Time:    10:58            Current Medications:     Infusions:         Scheduled:   folic acid  1 mg Oral Daily    gabapentin  300 mg Oral TID    heparin (porcine)  5,000 Units Subcutaneous Q8H    magnesium sulfate 2 g IVPB  2 g Intravenous Once    multivitamin  1 tablet Oral Daily    nicotine  1 patch Transdermal Daily    polyethylene glycol  17 g Oral Daily    thiamine (B-1) injection  400 mg Intravenous Q8H    Followed by    thiamine  100 mg Oral Q8H        PRN:    Current Facility-Administered Medications:     acetaminophen, 650 mg, Oral, Q4H PRN    dextrose 50%, 12.5 g, Intravenous, PRN    dextrose 50%, 25 g, Intravenous, PRN    glucagon (human recombinant), 1 mg, Intramuscular, PRN    glucose, 16 g, Oral, PRN    glucose, 24 g, Oral, PRN    melatonin, 6 mg, Oral, Nightly PRN    naloxone, 0.02 mg, Intravenous, PRN    sodium chloride 0.9%, 10 mL, Intravenous, Q12H PRN      Assessment/Plan:     Solomon Terrazas is a 74  y.o. male with a PMHx of Tobacco Use disorder and Alcohol Use disorder. The patient presented on 1/20/2025 for REDD and Presyncope now admitted for IV hydration and further workup.     Presyncope vs Syncope  - Unclear story; patient's story does not reveal any true LOC  - Suspect vasovagal etiology potentially related to dehydration; however unclear  - May be due to alcohol intoxication as patient has history of significant alcohol use  - CT Head negative for acute abnormalities   - TTE ordered   - Continue to monitor on telemetry  - Continue to workup for cause of syncope      Acute Kidney Injury  - Creatinine of 1.7 on admission; appears baseline is 1.3  - Urine studies ordered to evaluate etiology   -likely 2/2 to pre-renal etiology  -started on NS at rate 125cc/hr; Creatinine improved to 1.5  - Holding nephrotoxic meds; Monitor with daily CMP     Hyperkalemia; improving   -mild at 5.2 w/o evidence of EKG changes  - K 4.8 without intervention  - Continue to monitor with daily CMP  - Will shift if needed      Alcohol use disorder  - Patient has well documented history of heavy alcohol use  - Patient denies drinking prior to events leading up to hospitalization; however unreliable historian given delirium   - ciwa precautions in place; patient got one dose of 2 mg ativan due concern for withdrawal  - Initiated Gabapentin taper for alcohol withdrawals  - Started IV Thiamine replacement due to concern for Wernicke's encephalopathy      Tobacco Use Disorder  - patient on smoking cessation  -nicotine patches while inpatient.       Healthcare maintenance   -primary care provider: None     Diet: Adult regular   VTE PPx: Heparin  Code Status: Full Code    Dispo: Pending clinical improvement; treatment for alcohol withdrawals   Estimated LOS: 2-4 days      Jaime Gonsales MD  [facility] [facility] PGY-2

## 2025-01-22 NOTE — PLAN OF CARE
Patient Name: Alba Correa  : 1935  MRN: [id number]    Date of Admission: 2024  Date of Discharge:  2024  Primary Care Physician: Aramis Doty MD      Chief Complaint:   Fall, Back Pain, and Abdominal Pain (Upper abd pain, generalize back pain )      Discharge Diagnoses     Active Hospital Problems    Diagnosis  POA    **Compression fracture of T8 vertebra, initial encounter [S22.060A]  Yes    Left leg pain [M79.605]  Yes    Macrocytosis [D75.89]  Yes    Hyperlipidemia [E78.5]  Yes    Thyroid nodule [E04.1]  Yes    Essential hypertension [I10]  Yes    Chronic diastolic CHF (congestive heart failure) [I50.32]  Yes    Chronic kidney failure, stage 3 (moderate) [N18.30]  Yes    Fall [W19.XXXA]  Yes    Hypothyroidism (acquired) [E03.9]  Yes    Dizzy [R42]  Yes    Atrial fibrillation [I48.91]  Yes    History of CVA (cerebrovascular accident) [Z86.73]  Not Applicable      Resolved Hospital Problems    Diagnosis Date Resolved POA    Abdominal pain [R10.9] 2024 Yes    Hypercalcemia [E83.52] 2024 Yes        Hospital Course     Ms. Correa is a 89 y.o. female who presented to  initially complaining of back pain after a fall.  Please see the admitting history and physical for further details.  She was found to have thoracic compression fracture and was admitted to the hospital for further evaluation and treatment.  There was initial concern that she could have had some sort of cardiac or vascular event that led to her fall.  She had extensive workup that is outlined below that was essentially unremarkable.'s of stroke or cardiac event.  Stress test was negative echocardiogram reassuring.  Compression fractures she was seen by neurosurgery who recommended conservative nonoperative management with TLSO brace.  She continues to have significant issues with her mobility and pain control.  Arrangements have been made for her to go to a skilled nursing facility for    Problem: Adult Inpatient Plan of Care  Goal: Plan of Care Review  1/22/2025 0218 by Joselo Shelley RN  Outcome: Progressing  1/22/2025 0217 by Joselo Shelley RN  Outcome: Progressing  Goal: Patient-Specific Goal (Individualized)  1/22/2025 0218 by Joselo Shelley RN  Outcome: Progressing  1/22/2025 0217 by Joselo Shelley RN  Outcome: Progressing  Goal: Absence of Hospital-Acquired Illness or Injury  1/22/2025 0218 by Joselo Shelley RN  Outcome: Progressing  1/22/2025 0217 by Joselo Shelley RN  Outcome: Progressing  Goal: Optimal Comfort and Wellbeing  1/22/2025 0218 by Joselo Shelley RN  Outcome: Progressing  1/22/2025 0217 by Joselo Shelley RN  Outcome: Progressing  Goal: Readiness for Transition of Care  1/22/2025 0218 by Joselo Shelley RN  Outcome: Progressing  1/22/2025 0217 by Joselo Shelley RN  Outcome: Progressing     Problem: Acute Kidney Injury/Impairment  Goal: Fluid and Electrolyte Balance  1/22/2025 0218 by Joselo Shelley, RN  Outcome: Progressing  1/22/2025 0217 by Joselo Shelley RN  Outcome: Progressing  Goal: Improved Oral Intake  1/22/2025 0218 by Joselo Shelley RN  Outcome: Progressing  1/22/2025 0217 by Joselo Shelley RN  Outcome: Progressing  Goal: Effective Renal Function  1/22/2025 0218 by Joselo Shelley RN  Outcome: Progressing  1/22/2025 0217 by Joselo Shelley RN  Outcome: Progressing     Problem: Excessive Substance Use  Goal: Optimized Energy Level (Excessive Substance Use)  1/22/2025 0218 by Joselo Shelley RN  Outcome: Progressing  1/22/2025 0217 by Joselo Shelley RN  Outcome: Progressing  Goal: Improved Behavioral Control (Excessive Substance Use)  1/22/2025 0218 by Miles, Rodneisha F., RN  Outcome: Progressing  1/22/2025 0217 by Joselo Shelley, RN  Outcome: Progressing  Goal: Increased Participation and Engagement (Excessive Substance Use)  1/22/2025 0218 by Joselo Shelley  OZZY, RN  Outcome: Progressing  1/22/2025 0217 by Joselo Shelley RN  Outcome: Progressing  Goal: Improved Physiologic Symptoms (Excessive Substance Use)  1/22/2025 0218 by Joselo Shelley RN  Outcome: Progressing  1/22/2025 0217 by Joselo Shelley RN  Outcome: Progressing  Goal: Enhanced Social, Occupational or Functional Skills (Excessive Substance Use)  1/22/2025 0218 by Joselo Shelley, RN  Outcome: Progressing  1/22/2025 0217 by Joselo Shelley RN  Outcome: Progressing     Problem: Syncope  Goal: Absence of Syncopal Symptoms  1/22/2025 0218 by Joselo Shelley RN  Outcome: Progressing  1/22/2025 0217 by Joselo Shelley RN  Outcome: Progressing     Problem: Fall Injury Risk  Goal: Absence of Fall and Fall-Related Injury  1/22/2025 0218 by Joselo Shelley RN  Outcome: Progressing  1/22/2025 0217 by Joselo Shelley RN  Outcome: Progressing     Problem: Fatigue  Goal: Improved Activity Tolerance  1/22/2025 0218 by Joselo Shelley RN  Outcome: Progressing  1/22/2025 0217 by Joselo Shelley RN  Outcome: Progressing     Problem: Restraint, Nonviolent  Goal: Absence of Harm or Injury  1/22/2025 0218 by Joselo Shelley RN  Outcome: Progressing  1/22/2025 0217 by Joselo Shelley RN  Outcome: Progressing      further rehabilitative care.  She is only taking Tylenol for pain with Lidoderm patch.    She will follow-up with neurosurgery in 3-4 weeks and will need to remain in brace for 6-8 weeks.        Day of Discharge     Subjective:  Still in pain but excited to get out of the hospital and on to rehab.    Physical Exam:  Temp:  [97.9 °F (36.6 °C)-98.4 °F (36.9 °C)] 98.4 °F (36.9 °C)  Heart Rate:  [] 101  Resp:  [16] 16  BP: (110-135)/(70-85) 135/85  Body mass index is 20.51 kg/m².  Physical Exam  Vitals reviewed.   Constitutional:       General: She is not in acute distress.     Appearance: She is not ill-appearing.   Cardiovascular:      Rate and Rhythm: Normal rate. Rhythm irregular.   Pulmonary:      Effort: No respiratory distress.      Breath sounds: Normal breath sounds. No wheezing.   Abdominal:      General: There is no distension.      Palpations: Abdomen is soft.      Tenderness: There is no abdominal tenderness.   Musculoskeletal:      Right lower leg: No edema.      Left lower leg: No edema.   Skin:     General: Skin is warm and dry.      Findings: Bruising present.   Neurological:      Mental Status: She is alert and oriented to person, place, and time.   Psychiatric:         Mood and Affect: Mood normal.         Consultants     Consult Orders (all) (From admission, onward)       Start     Ordered    05/19/24 1423  Inpatient Case Management  Consult  Once        Provider:  (Not yet assigned)    05/19/24 1423    05/18/24 1807  Inpatient Neurology Consult General  Once        Specialty:  Neurology  Provider:  Aramis Leary MD    05/18/24 1806 05/18/24 1806  Inpatient Cardiology Consult  Once        Specialty:  Cardiology  Provider:  Roshan Martines III, MD    05/18/24 1806 05/18/24 1740  Inpatient Neurosurgery Consult  Once        Specialty:  Neurosurgery  Provider:  Edi Oates MD    05/18/24 1744             Procedures     Imaging Results (All)        Procedure Component Value Units Date/Time    MRI Lumbar Spine Without Contrast [[id number]] Collected: 05/22/24 1203     Updated: 05/22/24 1706    Narrative:      MRI OF THE LUMBAR SPINE WITHOUT CONTRAST 05/22/2024     CLINICAL HISTORY: Patient is status post fall and has low back pain and  has T8 compression fracture.     TECHNIQUE: Sagittal T1, proton density and fat-suppressed T2 weighted  images were obtained of the lumbar spine. In addition axial T2 weighted  images were obtained from T12 to S2. Thin cut axial T1-weighted images  were obtained angled through the interspaces from L3 to S1.     FINDINGS: The distal thoracic cord and the conus is normal in signal  intensity. The conus terminates at the L1-2 interspace level which is  normal.     This patient has a levoscoliotic curvature of the lumbar spine. Its apex  is at the L2 lumbar level.     At T12-L1, the posterior disc margin and facets are normal in appearance  with no canal or foraminal narrowing.     At L1-2, there is mild disc space narrowing, there is a 2 mm  retrolisthesis of L1 with respect to L2, minimal posterior disc bulge.  The facets are normal. There is no canal narrowing. There is mild  bilateral foraminal narrowing.     At L2-3, there is disc space narrowing and there are degenerative disc  and endplate changes most pronounced in the right side of the endplates  along the inner margin of the levoscoliotic curve. There is 2-3 mm  retrolisthesis of L2 with respect to L3, mild posterior spurring. The  facets are normal. There is essentially no canal narrowing. There is  some spurring and bulging disc material extending into the neural  foramina. There is mild left and there is moderate right foraminal  narrowing.     At L3-4, the posterior disc margin and facets are normal with no canal  or foraminal narrowing.     At L4-5, there is mild to moderate bilateral facet overgrowth. There is  a 4-5 mm degenerative anterolisthesis of L4 with respect  to L5. There is  essentially no canal narrowing. There is mild bilateral foraminal  narrowing.     At L5-S1, there are chronic bilateral pars interarticularis defects at  the L5 lumbar level better demonstrated on CT abdomen and pelvis  05/18/2024. There is a minimal 2 mm anterolisthesis of L5 with respect  to S1. There is no canal or lateral recess narrowing. There is no right  foraminal narrowing. There is a small cyst projecting over the lateral  aspect of the left neural foramen that measures about 8 x 5 x 8 mm and  may be a pedunculated synovial cyst off the anterior superior lateral  left facets, comes close to the posterior margin of the exiting left L5  nerve root.     No marrow signal abnormality is seen in the lumbar spine with no  convincing acute fracture seen of the lumbar spine.       Impression:      1. No convincing acute osseous abnormality is seen in the lumbar spine.     2. This patient has a levoscoliotic curvature of lumbar spine with its  apex at the L2-3 lumbar level. At L2-3 there is moderate to severe disc  space narrowing and there is 3 mm retrolisthesis of L2 with respect to  L3, mild posterior spurring and there is essentially no canal narrowing.  There is spurring into the foramina with mild left and moderate right  bony foraminal narrowing.     3. At L4-5, there is mild to moderate bilateral facet overgrowth and a  4-5 mm degenerative anterolisthesis of L4 with respect to L5 but there  is no canal narrowing and only mild bilateral foraminal narrowing.     4. At L5-S1, it appears that there are chronic bilateral pars  interarticularis defects at the L5 lumbar level better demonstrated on  CT of the abdomen and pelvis 4 days ago on 05/18/2024. There is a  minimal 2-3 mm anterolisthesis of L5 on S1 and there is no canal or  lateral recess or right foraminal narrowing. There is a 8 x 5 x 5 mm  cyst projecting over the lateral aspect of the left neural foramen that  may be a pedunculated  cyst off the anterior superior lateral margin of  the left facets, comes close to the posterior margin of the exiting left  L5 nerve root. The remainder of the MRI of the lumbar spine is  unremarkable.     This report was finalized on 5/22/2024 5:03 PM by Dr. Ankit Boss M.D  on Workstation: [id number]       MRI Thoracic Spine Without Contrast [[id number]] Collected: 05/20/24 2324     Updated: 05/20/24 2324    Narrative:        Patient: KACI CHAVEZ  Time Out: 23:23  Exam(s): MRI T SPINE Without Contrast     EXAM:    MR Thoracic Spine Without Intravenous Contrast    CLINICAL HISTORY:     Reason for exam: T8 fracture.    TECHNIQUE:    Magnetic resonance images of the thoracic spine without intravenous   contrast in multiple planes.    COMPARISON:  Comparison made to prior plain film images of the thoracic spine from May   18, 2024.    FINDINGS:    Vertebrae: There is a mildly displaced 2 column fracture of the   superior endplate of T8 with 47% loss of vertebral body height.    Extensive bone marrow edema and bulging of the posterior wall to the   spinal canal without significant stenosis.  The bone marrow signal is   heterogeneous.    Discs spinal canal neural foramina:  No acute findings.  No significant   disc disease.  No spinal canal stenosis.    Spinal cord:  Unremarkable.  Normal signal.    Soft tissues:  Unremarkable.    IMPRESSION:       There is an acute 2 column superior end plate fracture of T8 vertebral   body with 47% loss of vertebral body height and mild bulging of the   posterior wall into the spinal canal without significant stenosis.  This   fracture is amenable to vertebroplasty or kyphoplasty.      Impression:          Electronically signed by Merari Archuleta MD on 05-20-24 at 2323    CT Angiogram Head [[id number]] Collected: 05/20/24 1452     Updated: 05/20/24 1534    Narrative:      CONTRAST-ENHANCED CT ANGIOGRAM OF THE HEAD AND NECK ON 05/20/2024     CLINICAL HISTORY: This is a 88-year-old  female patient with a history of  dizziness. Yesterday, the patient had an episode of loss of  consciousness when she bent down to  an object and in the past  several days has been feeling dizzy with some lightheadedness.     TECHNIQUE: Spiral CT images were obtained from the base of the skull to  the vertex both pre and post intravenous contrast. The images were  reformatted and are submitted in 3 mm thick axial, sagittal and coronal  CT sections with brain algorithm. Additional spiral CT angiogram images  were obtained from the top of the aortic arch up through the great  vessels of the head and neck during the arterial phase of contrast and  the images were reformatted and submitted in 1 mm thick axial, sagittal  and coronal CT sections with soft tissue algorithm and 3D  reconstructions were performed to complete the CT angiogram of the head  and neck.     This is correlated to a prior head CT from [facility] on  05/18/2024 and a prior MRI of the brain and MRA of the head neck on  04/03/2019.     FINDINGS:  HEAD CT: There is patchy and confluent low-density extending from the  periventricular into the subcortical white matter of the cerebral  hemispheres consistent with moderate small vessel disease. The remainder  of the brain parenchyma is normal in attenuation. The ventricles are  normal in size. I see no focal mass effect. There is no midline shift.  No extra-axial fluid collections are identified. There is no evidence of  acute intracranial hemorrhage. The calvarium and the skull base are  normal in appearance. The paranasal sinuses and the mastoid air cells  and middle ear cavities are clear. Bilateral intraocular lens implants  are in place in the globes from previous bilateral cataract surgery  otherwise, the orbits are unremarkable.     CT ANGIOGRAM OF THE NECK: The nasopharynx, oropharynx, hypopharynx, true  cords and subglottic airway are normal in appearance. There is a small  8  mm nodule in the right lobe of the thyroid gland otherwise the thyroid  gland is unremarkable. The lung apices are clear. The parotid,  , parapharyngeal and submandibular spaces are symmetric and  are normal in appearance. Cervical spondylosis is present. There is  moderate left facet overgrowth with facet spurs contributing to mild  left foraminal narrowing at C3-4 and C4-5 and there is disc space  narrowing, degenerative endplate changes at C5-6 and C6-7. There is  uncovertebral joint hypertrophy contributing to mild to moderate right  bony foraminal narrowing at C5-6 and mild left foraminal narrowing at  C6-7. There are scattered calcified plaques in the aortic arch.  Calcified plaque mildly narrows the left subclavian artery origin. There  are noncalcified plaques mildly narrowing the mid left subclavian  artery. Noncalcified plaque at least mild to moderately narrows the left  vertebral artery origin. There is noncalcified plaque that mildly  narrows the left vertebral artery at the T1 thoracic level and there is  multifocal mild narrowing of the left vertebral artery and its cervical  segment. Left common carotid origins normal appearance no stenosis is  seen in the left common carotid artery and its bifurcation and left  internal and external carotid arteries is within normal limits and no  stenosis is seen in the cervical segment of the left internal carotid  artery using the NASCET criteria. The brachiocephalic artery origins  within normal limits. No stenosis seen in the brachiocephalic artery its  bifurcation into the right subclavian, carotid arteries normal in  appearance. No stenosis is seen in the right subclavian artery. The  right vertebral artery origin is normal in appearance. No stenosis is  seen in the right vertebral artery from its origin to the  vertebrobasilar junction. The right common carotid origins normal in  appearance. No stenosis seen in the right common carotid artery  its  bifurcation into the right internal and external carotid arteries is  within normal limits and no stenosis is seen in the cervical segment of  the right internal carotid artery using the NASCET criteria.     CT ANGIOGRAM OF THE HEAD: The intracranial segment of the dominant right  vertebral artery is widely patent to the vertebrobasilar junction  without stenosis the left vertebral artery is widely patent in its  proximal intracranial segment and gives off the left posterior inferior  cerebellar artery and the post PICA segment of the left vertebral artery  is tiny in diameter and occludes. There is a focal mild gnosis of the  mid basilar artery between the PICA origins and the superior cerebellar  artery origins. The distal basilar artery basilar tips normal in  appearance. The posterior cerebral and superior cerebellar arteries are  normal in appearance. The upper cervical, petrous cavernous and  supracavernous segments the internal carotid arteries are within normal  limits. There is an atretic A1 segment of the left anterior cerebral  artery. The dominant right A1 segments widely patent without stenosis in  the anterior communicating artery origins within normal limits and the  A2 segments of the anterior cerebral arteries are within normal limits.  The M1 segments of the middle cerebral arteries and the middle cerebral  artery bifurcations are within normal limits. There is a focal  mild-moderate stenosis the origin and proximal aspect of the inferior  division left M2 segment.       Impression:      1. Head CT demonstrates no convincing acute intracranial abnormality.  There is moderate small vessel disease in cerebral white matter. There  is mild diffuse cerebral atrophy. There are bilateral intraocular lens  implants from previous bilateral cataract surgery. Otherwise, the head  CT is within normal limits.  2. Mild cervical spondylosis is present as described above.  3. Calcified plaque mildly narrows  the left subclavian artery origin  noncalcified plaque mildly narrows the mid left subclavian artery. There  is noncalcified plaque that at least mild-moderate up to moderately  narrows the left vertebral artery origin and there is multifocal at  least mild up to areas of mild-moderate stenosis of the cervical segment  of the left vertebral artery. The proximal intracranial segment of the  left vertebral artery is widely patent to the left PICA origin. 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 No  additional stenosis is seen in the great vessels the neck. There is a  focal mild to moderate stenosis of the mid basilar artery which is a new  finding when compared to MRA of the head 04/03/2019 and there is a  mild-moderate stenosis at the origin and proximal inferior division M2  segment of the left middle cerebral artery that is also new when  compared to 04/03/2019 and is felt to be on the basis of some  intracranial atherosclerotic disease. The remainder of the CT angiogram  of the head and neck is normal. If there remains any clinical suspicion  of acute infarct causing the patient's dizziness an MRI of the brain  could be obtained for more complete assessment. The results were  communicated to Dr. Chani Monk by telephone on 05/20/2024 at 230  p.m.     Radiation dose reduction techniques were utilized, including automated  exposure control and exposure modulation based on body size.        This report was finalized on 5/20/2024 3:31 PM by Dr. Ankit Boss M.D  on Workstation: [id number]       CT Angiogram Neck [[id number]] Collected: 05/20/24 1452     Updated: 05/20/24 1534    Narrative:      CONTRAST-ENHANCED CT ANGIOGRAM OF THE HEAD AND NECK ON 05/20/2024     CLINICAL HISTORY: This is a 88-year-old female patient with a history of  dizziness. Yesterday, the patient had an episode of loss of  consciousness  when she bent down to  an object and in the past  several days has been feeling dizzy with some lightheadedness.     TECHNIQUE: Spiral CT images were obtained from the base of the skull to  the vertex both pre and post intravenous contrast. The images were  reformatted and are submitted in 3 mm thick axial, sagittal and coronal  CT sections with brain algorithm. Additional spiral CT angiogram images  were obtained from the top of the aortic arch up through the great  vessels of the head and neck during the arterial phase of contrast and  the images were reformatted and submitted in 1 mm thick axial, sagittal  and coronal CT sections with soft tissue algorithm and 3D  reconstructions were performed to complete the CT angiogram of the head  and neck.     This is correlated to a prior head CT from [facility] on  05/18/2024 and a prior MRI of the brain and MRA of the head neck on  04/03/2019.     FINDINGS:  HEAD CT: There is patchy and confluent low-density extending from the  periventricular into the subcortical white matter of the cerebral  hemispheres consistent with moderate small vessel disease. The remainder  of the brain parenchyma is normal in attenuation. The ventricles are  normal in size. I see no focal mass effect. There is no midline shift.  No extra-axial fluid collections are identified. There is no evidence of  acute intracranial hemorrhage. The calvarium and the skull base are  normal in appearance. The paranasal sinuses and the mastoid air cells  and middle ear cavities are clear. Bilateral intraocular lens implants  are in place in the globes from previous bilateral cataract surgery  otherwise, the orbits are unremarkable.     CT ANGIOGRAM OF THE NECK: The nasopharynx, oropharynx, hypopharynx, true  cords and subglottic airway are normal in appearance. There is a small 8  mm nodule in the right lobe of the thyroid gland otherwise the thyroid  gland is unremarkable. The lung  apices are clear. The parotid,  , parapharyngeal and submandibular spaces are symmetric and  are normal in appearance. Cervical spondylosis is present. There is  moderate left facet overgrowth with facet spurs contributing to mild  left foraminal narrowing at C3-4 and C4-5 and there is disc space  narrowing, degenerative endplate changes at C5-6 and C6-7. There is  uncovertebral joint hypertrophy contributing to mild to moderate right  bony foraminal narrowing at C5-6 and mild left foraminal narrowing at  C6-7. There are scattered calcified plaques in the aortic arch.  Calcified plaque mildly narrows the left subclavian artery origin. There  are noncalcified plaques mildly narrowing the mid left subclavian  artery. Noncalcified plaque at least mild to moderately narrows the left  vertebral artery origin. There is noncalcified plaque that mildly  narrows the left vertebral artery at the T1 thoracic level and there is  multifocal mild narrowing of the left vertebral artery and its cervical  segment. Left common carotid origins normal appearance no stenosis is  seen in the left common carotid artery and its bifurcation and left  internal and external carotid arteries is within normal limits and no  stenosis is seen in the cervical segment of the left internal carotid  artery using the NASCET criteria. The brachiocephalic artery origins  within normal limits. No stenosis seen in the brachiocephalic artery its  bifurcation into the right subclavian, carotid arteries normal in  appearance. No stenosis is seen in the right subclavian artery. The  right vertebral artery origin is normal in appearance. No stenosis is  seen in the right vertebral artery from its origin to the  vertebrobasilar junction. The right common carotid origins normal in  appearance. No stenosis seen in the right common carotid artery its  bifurcation into the right internal and external carotid arteries is  within normal limits and no  stenosis is seen in the cervical segment of  the right internal carotid artery using the NASCET criteria.     CT ANGIOGRAM OF THE HEAD: The intracranial segment of the dominant right  vertebral artery is widely patent to the vertebrobasilar junction  without stenosis the left vertebral artery is widely patent in its  proximal intracranial segment and gives off the left posterior inferior  cerebellar artery and the post PICA segment of the left vertebral artery  is tiny in diameter and occludes. There is a focal mild gnosis of the  mid basilar artery between the PICA origins and the superior cerebellar  artery origins. The distal basilar artery basilar tips normal in  appearance. The posterior cerebral and superior cerebellar arteries are  normal in appearance. The upper cervical, petrous cavernous and  supracavernous segments the internal carotid arteries are within normal  limits. There is an atretic A1 segment of the left anterior cerebral  artery. The dominant right A1 segments widely patent without stenosis in  the anterior communicating artery origins within normal limits and the  A2 segments of the anterior cerebral arteries are within normal limits.  The M1 segments of the middle cerebral arteries and the middle cerebral  artery bifurcations are within normal limits. There is a focal  mild-moderate stenosis the origin and proximal aspect of the inferior  division left M2 segment.       Impression:      1. Head CT demonstrates no convincing acute intracranial abnormality.  There is moderate small vessel disease in cerebral white matter. There  is mild diffuse cerebral atrophy. There are bilateral intraocular lens  implants from previous bilateral cataract surgery. Otherwise, the head  CT is within normal limits.  2. Mild cervical spondylosis is present as described above.  3. Calcified plaque mildly narrows the left subclavian artery origin  noncalcified plaque mildly narrows the mid left subclavian artery.  There  is noncalcified plaque that at least mild-moderate up to moderately  narrows the left vertebral artery origin and there is multifocal at  least mild up to areas of mild-moderate stenosis of the cervical segment  of the left vertebral artery. The proximal intracranial segment of the  left vertebral artery is widely patent to the left PICA origin. 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 No  additional stenosis is seen in the great vessels the neck. There is a  focal mild to moderate stenosis of the mid basilar artery which is a new  finding when compared to MRA of the head 04/03/2019 and there is a  mild-moderate stenosis at the origin and proximal inferior division M2  segment of the left middle cerebral artery that is also new when  compared to 04/03/2019 and is felt to be on the basis of some  intracranial atherosclerotic disease. The remainder of the CT angiogram  of the head and neck is normal. If there remains any clinical suspicion  of acute infarct causing the patient's dizziness an MRI of the brain  could be obtained for more complete assessment. The results were  communicated to Dr. Chani Mnok by telephone on 05/20/2024 at 230  p.m.     Radiation dose reduction techniques were utilized, including automated  exposure control and exposure modulation based on body size.        This report was finalized on 5/20/2024 3:31 PM by Dr. Ankit Boss M.D  on Workstation: [id number]       XR Hip With or Without Pelvis 2 - 3 View Left [[id number]] Collected: 05/20/24 1516     Updated: 05/20/24 1520    Narrative:      XR HIP W OR WO PELVIS 2-3 VIEW LEFT-     Clinical: Fell 4 days ago with left hip pain     FINDINGS: Moderate to substantial left hip joint degeneration, no  underlying avascular necrosis, fracture or bone lesion seen. Left  hemipelvis and overlying soft tissues unremarkable.     CONCLUSION:  Left hip joint degeneration however no underlying fracture  seen.     This report was finalized on 5/20/2024 3:17 PM by Dr. Ivan Dean M.D  on Workstation: [id number]       US Thyroid [[id number]] Collected: 05/18/24 2040     Updated: 05/18/24 2045    Narrative:      US THYROID-     Clinical: Thyroid nodule     COMPARISON: None     FINDINGS: The right lobe measures 4.5 x 1.6 x 1.5 cm. The left lobe  measures 3.8 x 1.2 x 1.3 cm and the isthmus is 1 mm.     There is a colloid cyst within the right thyroid which measures 9 x 6 x  7 mm. TI-RADS category 1, benign. No nodule within the isthmus or left  gland.     CONCLUSION: Colloid cyst within the right thyroid, TI-RADS category 1,  benign. No additional follow-up is indicated.     This report was finalized on 5/18/2024 8:42 PM by Dr. Ivan Dean M.D  on Workstation: [id number]       CT Abdomen Pelvis With Contrast [[id number]] Collected: 05/18/24 1350     Updated: 05/18/24 1917    Narrative:      CT ABDOMEN AND PELVIS WITH IV CONTRAST     HISTORY: 88-year-old female fell forward trying to  a tissue and  began having abdominal pain and back pain following a fall. No physical  bruising. Hysterectomy and appendectomy in the past.     TECHNIQUE: Radiation dose reduction techniques were utilized, including  automated exposure control and exposure modulation based on body size.   3 mm images were obtained through the abdomen and pelvis after the  administration of IV contrast. Compared with CTA abdomen/pelvis  03/08/2024.     FINDINGS:  There is some streak artifact from the patient's arms.     1. The liver appears unremarkable other than a faintly seen  hyperenhancing lesion at the posterior hepatic segment which was  significantly more evident on the previous CTA. Gallbladder appears  unremarkable and there is no biliary dilatation. The spleen, pancreas,  adrenals, and kidneys appear stable. Small left renal cyst is noted.  Urinary bladder is significantly  distended. Please correlate clinically  for urinary retention.     2. There is no acute bowel abnormality. There is no bowel ileus or  obstruction. There is an average volume of formed stool within the  colon. Stomach and duodenum are nearly collapsed. No free fluid or fluid  collections.     3. There are extensive abdominal aortic atherosclerotic changes without  aneurysmal dilatation. No acute fractures are seen. There are dependent  atelectatic changes at the lung bases. There are no pleural or  pericardial effusions at the visualized chest.     This report was finalized on 5/18/2024 7:14 PM by Dr. Aydee Walker M.D on  Workstation: [id number]       CT Head Without Contrast [[id number]] Collected: 05/18/24 1333     Updated: 05/18/24 1348    Narrative:      CT HEAD WO CONTRAST-, CT CERVICAL SPINE WO CONTRAST-     INDICATIONS: Dizziness     TECHNIQUE: Radiation dose reduction techniques were utilized, including  automated exposure control and exposure modulation based on body size.  Noncontrast head CT, cervical spine CT     COMPARISON: 12/7/2023     FINDINGS:     Head CT:     No acute intracranial hemorrhage, midline shift or mass effect. No acute  territorial infarct is identified.     Mild to moderate periventricular hypodensities suggest chronic small  vessel ischemic change in a patient this age.     Arterial calcifications are seen at the base of the brain.     Ventricles, cisterns, cerebral sulci are unremarkable for patient age.     The visualized paranasal sinuses, orbits, mastoid air cells are  unremarkable.        Cervical spine CT:     No acute fracture is identified.     Mild anterolisthesis of C3 on C4, C4 on C5.     No prominent osseous narrowing of the neural foramina. No high-grade  central stenosis is identified without the benefit of intrathecal  contrast material.     Degenerative changes are seen at the temporomandibular joints.     Right thyroid nodule is noted, suboptimally evaluated with  this  technique, thyroid ultrasound correlation advised as indicated. Carotid  arterial calcification is present.     Fibronodular changes at the lung apices appears similar from 3/8/2024  chest CT (other small nodules noted on the prior CT exam are not  included on this exam, stability cannot be characterized).          Impression:         No acute intracranial hemorrhage or hydrocephalus. If there is further  clinical concern, MRI could be considered for further evaluation.     Thyroid nodularity, as described.     This report was finalized on 5/18/2024 1:41 PM by Dr. Levy Mock M.D on Workstation: [id number]       CT Cervical Spine Without Contrast [[id number]] Collected: 05/18/24 1333     Updated: 05/18/24 1348    Narrative:      CT HEAD WO CONTRAST-, CT CERVICAL SPINE WO CONTRAST-     INDICATIONS: Dizziness     TECHNIQUE: Radiation dose reduction techniques were utilized, including  automated exposure control and exposure modulation based on body size.  Noncontrast head CT, cervical spine CT     COMPARISON: 12/7/2023     FINDINGS:     Head CT:     No acute intracranial hemorrhage, midline shift or mass effect. No acute  territorial infarct is identified.     Mild to moderate periventricular hypodensities suggest chronic small  vessel ischemic change in a patient this age.     Arterial calcifications are seen at the base of the brain.     Ventricles, cisterns, cerebral sulci are unremarkable for patient age.     The visualized paranasal sinuses, orbits, mastoid air cells are  unremarkable.        Cervical spine CT:     No acute fracture is identified.     Mild anterolisthesis of C3 on C4, C4 on C5.     No prominent osseous narrowing of the neural foramina. No high-grade  central stenosis is identified without the benefit of intrathecal  contrast material.     Degenerative changes are seen at the temporomandibular joints.     Right thyroid nodule is noted, suboptimally evaluated with this  technique,  thyroid ultrasound correlation advised as indicated. Carotid  arterial calcification is present.     Fibronodular changes at the lung apices appears similar from 3/8/2024  chest CT (other small nodules noted on the prior CT exam are not  included on this exam, stability cannot be characterized).          Impression:         No acute intracranial hemorrhage or hydrocephalus. If there is further  clinical concern, MRI could be considered for further evaluation.     Thyroid nodularity, as described.     This report was finalized on 5/18/2024 1:41 PM by Dr. Levy Mock M.D on Workstation: [email or web address]       XR Chest 1 View [[id number]] Collected: 05/18/24 1211     Updated: 05/18/24 1219    Narrative:      XR CHEST 1 VW-, XR SPINE LUMBAR COMPLETE 4+VW-, XR SPINE THORACIC 3 VW-     HISTORY: Female who is 88 years-old, dizziness, fall injury     TECHNIQUE: Frontal views of the chest, 3 views of the thoracic spine, 5  views of the lumbar spine     COMPARISON: 3/8/2024     FINDINGS: Heart size is borderline. Pulmonary vasculature is  unremarkable. Aorta is calcified. Cardiac loop recorder is apparent. No  focal pulmonary consolidation, pleural effusion, or pneumothorax. Mild  biapical fibronodular changes are seen. No acute osseous process.     Thoracic and lumbar spine: Mild S-shaped thoracolumbar scoliosis is  apparent. A T8 compression fracture is age-indeterminate, but new from  the prior exam, with about 40% loss of height anteriorly. No other  fractures are identified. Mild anterior listhesis of L4 on L5 is noted.  Multilevel endplate spurring and lower lumbar facet arthropathy are  present. Disc space narrowing is apparent at L2/3. If further imaging  evaluation of the spine is indicated, MRI could be considered.       Impression:      As described.     This report was finalized on 5/18/2024 12:16 PM by Dr. Levy Mock M.D on Workstation: [email or web address]       XR Spine Thoracic 3 View [[id number]] Collected:  05/18/24 1211     Updated: 05/18/24 1219    Narrative:      XR CHEST 1 VW-, XR SPINE LUMBAR COMPLETE 4+VW-, XR SPINE THORACIC 3 VW-     HISTORY: Female who is 88 years-old, dizziness, fall injury     TECHNIQUE: Frontal views of the chest, 3 views of the thoracic spine, 5  views of the lumbar spine     COMPARISON: 3/8/2024     FINDINGS: Heart size is borderline. Pulmonary vasculature is  unremarkable. Aorta is calcified. Cardiac loop recorder is apparent. No  focal pulmonary consolidation, pleural effusion, or pneumothorax. Mild  biapical fibronodular changes are seen. No acute osseous process.     Thoracic and lumbar spine: Mild S-shaped thoracolumbar scoliosis is  apparent. A T8 compression fracture is age-indeterminate, but new from  the prior exam, with about 40% loss of height anteriorly. No other  fractures are identified. Mild anterior listhesis of L4 on L5 is noted.  Multilevel endplate spurring and lower lumbar facet arthropathy are  present. Disc space narrowing is apparent at L2/3. If further imaging  evaluation of the spine is indicated, MRI could be considered.       Impression:      As described.     This report was finalized on 5/18/2024 12:16 PM by Dr. Levy Mock M.D on Workstation: [email or web address]       XR Spine Lumbar Complete 4+VW [[id number]] Collected: 05/18/24 1211     Updated: 05/18/24 1219    Narrative:      XR CHEST 1 VW-, XR SPINE LUMBAR COMPLETE 4+VW-, XR SPINE THORACIC 3 VW-     HISTORY: Female who is 88 years-old, dizziness, fall injury     TECHNIQUE: Frontal views of the chest, 3 views of the thoracic spine, 5  views of the lumbar spine     COMPARISON: 3/8/2024     FINDINGS: Heart size is borderline. Pulmonary vasculature is  unremarkable. Aorta is calcified. Cardiac loop recorder is apparent. No  focal pulmonary consolidation, pleural effusion, or pneumothorax. Mild  biapical fibronodular changes are seen. No acute osseous process.     Thoracic and lumbar spine: Mild S-shaped  thoracolumbar scoliosis is  apparent. A T8 compression fracture is age-indeterminate, but new from  the prior exam, with about 40% loss of height anteriorly. No other  fractures are identified. Mild anterior listhesis of L4 on L5 is noted.  Multilevel endplate spurring and lower lumbar facet arthropathy are  present. Disc space narrowing is apparent at L2/3. If further imaging  evaluation of the spine is indicated, MRI could be considered.       Impression:      As described.     This report was finalized on 5/18/2024 12:16 PM by Dr. Levy Mock M.D on Workstation: [email or web address]               Results for orders placed during the hospital encounter of 05/18/24    Adult Transthoracic Echo Complete W/ Cont if Necessary Per Protocol    Interpretation Summary    Left ventricular ejection fraction appears to be 61 - 65%.    Left ventricular diastolic function was indeterminate.    Estimated right ventricular systolic pressure from tricuspid regurgitation is normal (<35 mmHg).    Pertinent Labs     Results from last 7 days   Lab Units 05/19/24  0508 05/18/24  1130   WBC 10*3/mm3 5.73 6.37   HEMOGLOBIN g/dL 11.7* 13.7   PLATELETS 10*3/mm3 233 258     Results from last 7 days   Lab Units 05/22/24  0818 05/21/24 0443 05/20/24  2120 05/20/24  0628 05/19/24  0508   SODIUM mmol/L 135* 136  --  137 141   POTASSIUM mmol/L 4.1 4.3 3.9 3.1* 4.1   CHLORIDE mmol/L 101 104  --  102 107   CO2 mmol/L 20.0* 21.5*  --  23.9 24.0   BUN mg/dL 26* 19  --  15 19   CREATININE mg/dL 0.87 0.84  --  0.58 0.81   GLUCOSE mg/dL 134* 106*  --  128* 97   EGFR mL/min/1.73 63.0 66.1  --  86.1 69.1     Results from last 7 days   Lab Units 05/18/24  1130   ALBUMIN g/dL 4.4   BILIRUBIN mg/dL 0.4   ALK PHOS U/L 100   AST (SGOT) U/L 24   ALT (SGPT) U/L 15     Results from last 7 days   Lab Units 05/22/24  0818 05/21/24  0443 05/20/24  0628 05/19/24  0508 05/18/24  1809 05/18/24  1130   CALCIUM mg/dL 9.2 9.2 9.3 8.9   < > 9.8*   ALBUMIN g/dL  --   --   --    --   --  4.4   MAGNESIUM mg/dL  --   --   --   --   --  2.1    < > = values in this interval not displayed.     Results from last 7 days   Lab Units 05/19/24  0508 05/18/24  1130   LIPASE U/L 42 62*     Results from last 7 days   Lab Units 05/18/24  1341 05/18/24  1130   HSTROP T ng/L 14* 15*       Results from last 7 days   Lab Units 05/19/24  0508   CHOLESTEROL mg/dL 170   TRIGLYCERIDES mg/dL 123   HDL CHOL mg/dL 35*   LDL CHOL mg/dL 113*             Test Results Pending at Discharge       Discharge Details        Discharge Medications        New Medications        Instructions Start Date   Lidocaine 4 %   1 patch, Transdermal, Every 24 Hours Scheduled, Remove & Discard patch within 12 hours or as directed by MD   Start Date: May 25, 2024     sennosides-docusate 8.6-50 MG per tablet  Commonly known as: PERICOLACE   2 tablets, Oral, 2 Times Daily             Continue These Medications        Instructions Start Date   acetaminophen 650 MG 8 hr tablet  Commonly known as: TYLENOL   1,300 mg, Oral, Every 8 Hours PRN      allopurinol 100 MG tablet  Commonly known as: ZYLOPRIM   100 mg, Oral, Daily      DULoxetine 60 MG capsule  Commonly known as: CYMBALTA   60 mg, Oral, Daily      Eliquis 2.5 MG tablet tablet  Generic drug: apixaban   2.5 mg, Oral, 2 Times Daily      levothyroxine 50 MCG tablet  Commonly known as: SYNTHROID, LEVOTHROID   50 mcg, Oral, Every Morning      losartan 25 MG tablet  Commonly known as: Cozaar   12.5 mg, Oral, Daily             Stop These Medications      Calcium Citrate-Vitamin D3 315-6.25 MG-MCG tablet tablet  Commonly known as: CITRACAL     Centrum Silver tablet  Generic drug: multivitamin with minerals     OMEGA-3 FISH OIL PO              Allergies   Allergen Reactions    Hydrocodone-Acetaminophen Nausea And Vomiting and Dizziness    Sulfamethoxazole-Trimethoprim Delirium    Lisinopril Unknown (See Comments)     Unknown per pt    Levofloxacin Rash       Discharge Disposition:  Skilled  Nursing Facility (DC - External)      Discharge Diet:  Diet Order   Procedures    Diet: Cardiac; Healthy Heart (2-3 Na+); Fluid Consistency: Thin (IDDSI 0)       Discharge Activity:   Activity Instructions       Activity as Tolerated              CODE STATUS:    Code Status and Medical Interventions:   Ordered at: 05/18/24 174     Code Status (Patient has no pulse and is not breathing):    CPR (Attempt to Resuscitate)     Medical Interventions (Patient has pulse or is breathing):    Full Support       Future Appointments   Date Time Provider Department Center   6/11/2024 10:30 AM Roshan Martines III, MD [facility]     Additional Instructions for the Follow-ups that You Need to Schedule       Discharge Follow-up with PCP   As directed       Currently Documented PCP:    Aramis Doty MD    PCP Phone Number:    [phone]     Follow Up Details: 1 to 2 weeks (or sooner if problems)               Contact information for follow-up providers       Aramis Doty MD .    Specialty: Family Medicine  Why: 1 to 2 weeks (or sooner if problems)  Contact information:  [address] [place] [address]  [phone]                       Contact information for after-discharge care       Destination       [facility] .    Service: Skilled Nursing  Contact information:  [address] [address]  [phone]                                   Additional Instructions for the Follow-ups that You Need to Schedule       Discharge Follow-up with PCP   As directed       Currently Documented PCP:    Aramis Doty MD    PCP Phone Number:    [phone]     Follow Up Details: 1 to 2 weeks (or sooner if problems)            Time Spent on Discharge:  Greater than 30 minutes      Erasto Pompa MD  [facility]  05/24/24  10:45 EDT

## 2025-01-22 NOTE — PLAN OF CARE
Problem: Adult Inpatient Plan of Care  Goal: Plan of Care Review  Outcome: Progressing  Goal: Patient-Specific Goal (Individualized)  Outcome: Progressing  Goal: Absence of Hospital-Acquired Illness or Injury  Outcome: Progressing  Goal: Optimal Comfort and Wellbeing  Outcome: Progressing  Goal: Readiness for Transition of Care  Outcome: Progressing     Problem: Acute Kidney Injury/Impairment  Goal: Fluid and Electrolyte Balance  Outcome: Progressing  Goal: Improved Oral Intake  Outcome: Progressing  Goal: Effective Renal Function  Outcome: Progressing     Problem: Excessive Substance Use  Goal: Optimized Energy Level (Excessive Substance Use)  Outcome: Progressing  Goal: Improved Behavioral Control (Excessive Substance Use)  Outcome: Progressing  Goal: Increased Participation and Engagement (Excessive Substance Use)  Outcome: Progressing  Goal: Improved Physiologic Symptoms (Excessive Substance Use)  Outcome: Progressing  Goal: Enhanced Social, Occupational or Functional Skills (Excessive Substance Use)  Outcome: Progressing     Problem: Syncope  Goal: Absence of Syncopal Symptoms  Outcome: Progressing     Problem: Fall Injury Risk  Goal: Absence of Fall and Fall-Related Injury  Outcome: Progressing     Problem: Fatigue  Goal: Improved Activity Tolerance  Outcome: Progressing     Problem: Restraint, Nonviolent  Goal: Absence of Harm or Injury  Outcome: Progressing

## 2025-01-23 LAB
ALBUMIN SERPL BCP-MCNC: 3.7 G/DL (ref 3.5–5.2)
ALP SERPL-CCNC: 78 U/L (ref 40–150)
ALT SERPL W/O P-5'-P-CCNC: 14 U/L (ref 10–44)
ANION GAP SERPL CALC-SCNC: 10 MMOL/L (ref 8–16)
AST SERPL-CCNC: 25 U/L (ref 10–40)
BASOPHILS # BLD AUTO: 0.01 K/UL (ref 0–0.2)
BASOPHILS NFR BLD: 0.2 % (ref 0–1.9)
BILIRUB SERPL-MCNC: 0.9 MG/DL (ref 0.1–1)
BUN SERPL-MCNC: 24 MG/DL (ref 8–23)
CALCIUM SERPL-MCNC: 10.2 MG/DL (ref 8.7–10.5)
CHLORIDE SERPL-SCNC: 109 MMOL/L (ref 95–110)
CO2 SERPL-SCNC: 19 MMOL/L (ref 23–29)
CREAT SERPL-MCNC: 1.3 MG/DL (ref 0.5–1.4)
DIFFERENTIAL METHOD BLD: ABNORMAL
EOSINOPHIL # BLD AUTO: 0 K/UL (ref 0–0.5)
EOSINOPHIL NFR BLD: 0.2 % (ref 0–8)
ERYTHROCYTE [DISTWIDTH] IN BLOOD BY AUTOMATED COUNT: 12.1 % (ref 11.5–14.5)
EST. GFR  (NO RACE VARIABLE): 57 ML/MIN/1.73 M^2
FOLATE SERPL-MCNC: >40 NG/ML (ref 4–24)
GLUCOSE SERPL-MCNC: 96 MG/DL (ref 70–110)
HCT VFR BLD AUTO: 35.6 % (ref 40–54)
HGB BLD-MCNC: 11.3 G/DL (ref 14–18)
IMM GRANULOCYTES # BLD AUTO: 0.02 K/UL (ref 0–0.04)
IMM GRANULOCYTES NFR BLD AUTO: 0.4 % (ref 0–0.5)
IRON SERPL-MCNC: 44 UG/DL (ref 45–160)
LYMPHOCYTES # BLD AUTO: 0.6 K/UL (ref 1–4.8)
LYMPHOCYTES NFR BLD: 12.2 % (ref 18–48)
MAGNESIUM SERPL-MCNC: 2.2 MG/DL (ref 1.6–2.6)
MCH RBC QN AUTO: 30.1 PG (ref 27–31)
MCHC RBC AUTO-ENTMCNC: 31.7 G/DL (ref 32–36)
MCV RBC AUTO: 95 FL (ref 82–98)
MONOCYTES # BLD AUTO: 0.6 K/UL (ref 0.3–1)
MONOCYTES NFR BLD: 11.4 % (ref 4–15)
NEUTROPHILS # BLD AUTO: 3.8 K/UL (ref 1.8–7.7)
NEUTROPHILS NFR BLD: 75.6 % (ref 38–73)
NRBC BLD-RTO: 0 /100 WBC
PHOSPHATE SERPL-MCNC: 3.4 MG/DL (ref 2.7–4.5)
PLATELET # BLD AUTO: 187 K/UL (ref 150–450)
PMV BLD AUTO: 11.8 FL (ref 9.2–12.9)
POTASSIUM SERPL-SCNC: 4.7 MMOL/L (ref 3.5–5.1)
PROT SERPL-MCNC: 7.1 G/DL (ref 6–8.4)
RBC # BLD AUTO: 3.76 M/UL (ref 4.6–6.2)
SATURATED IRON: 15 % (ref 20–50)
SODIUM SERPL-SCNC: 138 MMOL/L (ref 136–145)
TOTAL IRON BINDING CAPACITY: 290 UG/DL (ref 250–450)
TRANSFERRIN SERPL-MCNC: 196 MG/DL (ref 200–375)
VIT B12 SERPL-MCNC: 245 PG/ML (ref 210–950)
WBC # BLD AUTO: 4.98 K/UL (ref 3.9–12.7)

## 2025-01-23 PROCEDURE — 84100 ASSAY OF PHOSPHORUS: CPT

## 2025-01-23 PROCEDURE — 63600175 PHARM REV CODE 636 W HCPCS

## 2025-01-23 PROCEDURE — 11000001 HC ACUTE MED/SURG PRIVATE ROOM

## 2025-01-23 PROCEDURE — A4216 STERILE WATER/SALINE, 10 ML: HCPCS

## 2025-01-23 PROCEDURE — 25000003 PHARM REV CODE 250

## 2025-01-23 PROCEDURE — 85025 COMPLETE CBC W/AUTO DIFF WBC: CPT

## 2025-01-23 PROCEDURE — 36415 COLL VENOUS BLD VENIPUNCTURE: CPT

## 2025-01-23 PROCEDURE — S4991 NICOTINE PATCH NONLEGEND: HCPCS

## 2025-01-23 PROCEDURE — 80053 COMPREHEN METABOLIC PANEL: CPT

## 2025-01-23 PROCEDURE — 83735 ASSAY OF MAGNESIUM: CPT

## 2025-01-23 RX ORDER — CYANOCOBALAMIN 1000 UG/ML
1000 INJECTION, SOLUTION INTRAMUSCULAR; SUBCUTANEOUS
Status: DISCONTINUED | OUTPATIENT
Start: 2025-01-24 | End: 2025-01-24

## 2025-01-23 RX ORDER — GABAPENTIN 300 MG/1
300 CAPSULE ORAL 2 TIMES DAILY
Status: DISCONTINUED | OUTPATIENT
Start: 2025-01-24 | End: 2025-01-28

## 2025-01-23 RX ADMIN — THERA TABS 1 TABLET: TAB at 08:01

## 2025-01-23 RX ADMIN — HEPARIN SODIUM 5000 UNITS: 5000 INJECTION INTRAVENOUS; SUBCUTANEOUS at 02:01

## 2025-01-23 RX ADMIN — Medication 6 MG: at 08:01

## 2025-01-23 RX ADMIN — THIAMINE HYDROCHLORIDE 400 MG: 100 INJECTION, SOLUTION INTRAMUSCULAR; INTRAVENOUS at 02:01

## 2025-01-23 RX ADMIN — Medication 10 ML: at 08:01

## 2025-01-23 RX ADMIN — THIAMINE HYDROCHLORIDE 400 MG: 100 INJECTION, SOLUTION INTRAMUSCULAR; INTRAVENOUS at 05:01

## 2025-01-23 RX ADMIN — HEPARIN SODIUM 5000 UNITS: 5000 INJECTION INTRAVENOUS; SUBCUTANEOUS at 05:01

## 2025-01-23 RX ADMIN — HEPARIN SODIUM 5000 UNITS: 5000 INJECTION INTRAVENOUS; SUBCUTANEOUS at 09:01

## 2025-01-23 RX ADMIN — POLYETHYLENE GLYCOL 3350 17 G: 17 POWDER, FOR SOLUTION ORAL at 08:01

## 2025-01-23 RX ADMIN — FOLIC ACID 1 MG: 1 TABLET ORAL at 08:01

## 2025-01-23 RX ADMIN — THIAMINE HYDROCHLORIDE 400 MG: 100 INJECTION, SOLUTION INTRAMUSCULAR; INTRAVENOUS at 09:01

## 2025-01-23 RX ADMIN — GABAPENTIN 300 MG: 300 CAPSULE ORAL at 08:01

## 2025-01-23 RX ADMIN — GABAPENTIN 300 MG: 300 CAPSULE ORAL at 02:01

## 2025-01-23 RX ADMIN — NICOTINE 1 PATCH: 21 PATCH, EXTENDED RELEASE TRANSDERMAL at 08:01

## 2025-01-23 NOTE — PLAN OF CARE
Sw unable to complete DCA with pt, sw unable to contact family.  Cm will continue to follow pt through transitions of care and assist with any discharge needs.    SNOW Porras  [phone]    No future appointments.     01/23/25 8186   Discharge Plan   Discharge Plan A Home with family

## 2025-01-23 NOTE — PROGRESS NOTES
"[facility] Medicine Progress Note    Admitting Team: [facility] Hospitalist Team A  Attending Physician: Stephanie Adhikari MD  Resident: Dru  Intern: Louann     Date of Admit: 1/20/2025    Subjective/Interval History      Patient seen and examined this morning.  Patient disoriented on exam; will reach out to family for collateral.  Patient remains in restraints as he pulled out two peripheral IV overnight.  Continuing to treat with Gabapentin taper and IV Thiamine for Alcohol use/Wernicke's Encephalopathy concerns.       Objective     Physical Examination:  BP (!) 155/80   Pulse 63   Temp 98.2 °F (36.8 °C)   Resp 17   Ht 5' 8" (1.727 m)   Wt 57.3 kg (126 lb 5.2 oz)   SpO2 96%   BMI 19.21 kg/m²    Physical Exam  Vitals and nursing note reviewed.   Constitutional:       Appearance: Normal appearance.      Comments: Disoriented on exam   HENT:      Head: Normocephalic.      Nose: Nose normal.      Mouth/Throat:      Mouth: Mucous membranes are moist.   Eyes:      Extraocular Movements: Extraocular movements intact.      Pupils: Pupils are equal, round, and reactive to light.   Cardiovascular:      Rate and Rhythm: Normal rate and regular rhythm.      Pulses: Normal pulses.      Heart sounds: Normal heart sounds.   Pulmonary:      Effort: Pulmonary effort is normal.      Breath sounds: Normal breath sounds.   Abdominal:      General: Abdomen is flat.      Palpations: Abdomen is soft.   Musculoskeletal:      Comments: Patient in soft restraints   Skin:     General: Skin is warm.      Capillary Refill: Capillary refill takes less than 2 seconds.   Neurological:      General: No focal deficit present.      Mental Status: Mental status is at baseline. He is disoriented.   Psychiatric:         Mood and Affect: Mood is anxious.         Thought Content: Thought content is delusional.       Intake/Output:    Intake/Output Summary (Last 24 hours) at 1/23/2025 0950  Last data filed at 1/23/2025 0854  Gross per 24 " hour   Intake 360 ml   Output 200 ml   Net 160 ml     Net IO Since Admission: 1,308.92 mL [01/23/25 0950]    Laboratory data: reviewed     Microbiology data: reviewed   Blood cultures NG2D    EKG data: reviewed   Sinus Bradycardia     Radiology data: reviewed   US Liver with Doppler (xpd)   Final Result      No definite acute sonographic findings.      Additional details, as provided in the body of report.         Electronically signed by: Lorne Coleman   Date:    01/21/2025   Time:    12:52      CT Head Without Contrast   Final Result      1. No CT evidence of acute intracranial abnormality. Clinical correlation and further evaluation as warranted.   2. Generalized cerebral volume loss and findings suggestive of chronic microvascular ischemic change.   3. Paranasal sinus disease as above.         Electronically signed by: Paloma High MD   Date:    01/21/2025   Time:    01:22      X-Ray Chest AP Portable   Final Result      No acute process seen.         Electronically signed by: Lewis Samano MD   Date:    01/20/2025   Time:    10:58            Current Medications:     Infusions:         Scheduled:   folic acid  1 mg Oral Daily    gabapentin  300 mg Oral TID    [START ON 1/24/2025] gabapentin  300 mg Oral BID    heparin (porcine)  5,000 Units Subcutaneous Q8H    multivitamin  1 tablet Oral Daily    nicotine  1 patch Transdermal Daily    polyethylene glycol  17 g Oral Daily    [START ON 1/28/2025] thiamine (B-1) injection  200 mg Intravenous Daily    thiamine (B-1) injection  400 mg Intravenous Q8H        PRN:    Current Facility-Administered Medications:     acetaminophen, 650 mg, Oral, Q4H PRN    dextrose 50%, 12.5 g, Intravenous, PRN    dextrose 50%, 25 g, Intravenous, PRN    glucagon (human recombinant), 1 mg, Intramuscular, PRN    glucose, 16 g, Oral, PRN    glucose, 24 g, Oral, PRN    melatonin, 6 mg, Oral, Nightly PRN    naloxone, 0.02 mg, Intravenous, PRN    sodium chloride 0.9%, 10 mL, Intravenous, Q12H  PRN      Assessment/Plan:     Solomon Terrazas is a 74 y.o. male with a PMHx of Tobacco Use disorder and Alcohol Use disorder. The patient presented on 1/20/2025 for REDD and Presyncope now admitted for IV hydration and further workup.     Presyncope vs Syncope  - Unclear story; patient's story does not reveal any true LOC  - Suspect vasovagal etiology potentially related to dehydration; however unclear  - May be due to alcohol intoxication as patient has history of significant alcohol use  - CT Head negative for acute abnormalities   - TTE ordered   - Continue to monitor on telemetry  - Continue to workup for cause of syncope      Acute Kidney Injury; improving   - Creatinine of 1.7 on admission; appears baseline is 1.3  - Urine studies ordered to evaluate etiology   -likely 2/2 to pre-renal etiology  -started on NS at rate 125cc/hr; Creatinine improved to 1.5  - Holding nephrotoxic meds; Monitor with daily CMP  - 1/23: Creatinine improved to 1.3     Hyperkalemia; improving   -mild at 5.2 w/o evidence of EKG changes  - K 4.8 without intervention  - Continue to monitor with daily CMP  - Will shift if needed      Alcohol use disorder  - Patient has well documented history of heavy alcohol use  - Patient denies drinking prior to events leading up to hospitalization; however unreliable historian given delirium   - ciwa precautions in place; patient got one dose of 2 mg ativan due concern for withdrawal  - Initiated Gabapentin taper for alcohol withdrawals  - Started IV Thiamine replacement due to concern for Wernicke's encephalopathy      Tobacco Use Disorder  - patient on smoking cessation  -nicotine patches while inpatient.       Healthcare maintenance   -primary care provider: None     Diet: Adult regular   VTE PPx: Heparin  Code Status: Full Code    Dispo: Pending clinical improvement; treatment for alcohol withdrawals   Estimated LOS: 2-4 days      Jaime Gonsales MD  [employer] [facility] PGY-2

## 2025-01-24 ENCOUNTER — CLINICAL SUPPORT (OUTPATIENT)
Dept: SMOKING CESSATION | Facility: CLINIC | Age: 75
End: 2025-01-24

## 2025-01-24 DIAGNOSIS — F17.210 CIGARETTE SMOKER: Primary | ICD-10-CM

## 2025-01-24 PROBLEM — L60.2 ONYCHOGRYPHOSIS: Status: ACTIVE | Noted: 2025-01-24

## 2025-01-24 LAB
ALBUMIN SERPL BCP-MCNC: 3.4 G/DL (ref 3.5–5.2)
ALP SERPL-CCNC: 72 U/L (ref 40–150)
ALT SERPL W/O P-5'-P-CCNC: 12 U/L (ref 10–44)
ANION GAP SERPL CALC-SCNC: 10 MMOL/L (ref 8–16)
APICAL FOUR CHAMBER EJECTION FRACTION: 57 %
APICAL TWO CHAMBER EJECTION FRACTION: 63 %
AST SERPL-CCNC: 23 U/L (ref 10–40)
AV INDEX (PROSTH): 1.21
AV MEAN GRADIENT: 2 MMHG
AV PEAK GRADIENT: 4 MMHG
AV VELOCITY RATIO: 0.9
BASOPHILS # BLD AUTO: 0.01 K/UL (ref 0–0.2)
BASOPHILS NFR BLD: 0.2 % (ref 0–1.9)
BILIRUB SERPL-MCNC: 0.8 MG/DL (ref 0.1–1)
BSA FOR ECHO PROCEDURE: 1.68 M2
BUN SERPL-MCNC: 25 MG/DL (ref 8–23)
CALCIUM SERPL-MCNC: 9.8 MG/DL (ref 8.7–10.5)
CHLORIDE SERPL-SCNC: 113 MMOL/L (ref 95–110)
CO2 SERPL-SCNC: 13 MMOL/L (ref 23–29)
CREAT SERPL-MCNC: 1.4 MG/DL (ref 0.5–1.4)
CV ECHO LV RWT: 0.58 CM
DIFFERENTIAL METHOD BLD: ABNORMAL
DOP CALC AO PEAK VEL: 1 M/S
DOP CALC AO VTI: 14.5 CM
DOP CALC LVOT PEAK VEL: 0.9 M/S
DOP CALCLVOT PEAK VEL VTI: 17.6 CM
E WAVE DECELERATION TIME: 194 MSEC
E/A RATIO: 0.49
E/E' RATIO: 3 M/S
ECHO LV POSTERIOR WALL: 1.1 CM (ref 0.6–1.1)
EOSINOPHIL # BLD AUTO: 0 K/UL (ref 0–0.5)
EOSINOPHIL NFR BLD: 0.8 % (ref 0–8)
ERYTHROCYTE [DISTWIDTH] IN BLOOD BY AUTOMATED COUNT: 11.9 % (ref 11.5–14.5)
EST. GFR  (NO RACE VARIABLE): 52 ML/MIN/1.73 M^2
FRACTIONAL SHORTENING: 36.8 % (ref 28–44)
GLUCOSE SERPL-MCNC: 95 MG/DL (ref 70–110)
HCT VFR BLD AUTO: 36.7 % (ref 40–54)
HGB BLD-MCNC: 11.7 G/DL (ref 14–18)
IMM GRANULOCYTES # BLD AUTO: 0.01 K/UL (ref 0–0.04)
IMM GRANULOCYTES NFR BLD AUTO: 0.2 % (ref 0–0.5)
INTERVENTRICULAR SEPTUM: 0.9 CM (ref 0.6–1.1)
IVRT: 108 MSEC
LEFT ATRIUM AREA SYSTOLIC (APICAL 2 CHAMBER): 14.87 CM2
LEFT ATRIUM AREA SYSTOLIC (APICAL 4 CHAMBER): 14.01 CM2
LEFT ATRIUM VOLUME INDEX MOD: 19 ML/M2
LEFT ATRIUM VOLUME MOD: 33 ML
LEFT INTERNAL DIMENSION IN SYSTOLE: 2.4 CM (ref 2.1–4)
LEFT VENTRICLE DIASTOLIC VOLUME INDEX: 35.34 ML/M2
LEFT VENTRICLE DIASTOLIC VOLUME: 60.07 ML
LEFT VENTRICLE END DIASTOLIC VOLUME APICAL 2 CHAMBER: 25.79 ML
LEFT VENTRICLE END DIASTOLIC VOLUME APICAL 4 CHAMBER: 20.36 ML
LEFT VENTRICLE END SYSTOLIC VOLUME APICAL 2 CHAMBER: 35.29 ML
LEFT VENTRICLE END SYSTOLIC VOLUME APICAL 4 CHAMBER: 30.94 ML
LEFT VENTRICLE MASS INDEX: 69 G/M2
LEFT VENTRICLE SYSTOLIC VOLUME INDEX: 11.2 ML/M2
LEFT VENTRICLE SYSTOLIC VOLUME: 19.09 ML
LEFT VENTRICULAR INTERNAL DIMENSION IN DIASTOLE: 3.8 CM (ref 3.5–6)
LEFT VENTRICULAR MASS: 117.3 G
LV LATERAL E/E' RATIO: 2.6 M/S
LV SEPTAL E/E' RATIO: 3.4 M/S
LVED V (TEICH): 60.07 ML
LVES V (TEICH): 19.09 ML
LVOT MG: 1.76 MMHG
LVOT MV: 0.63 CM/S
LYMPHOCYTES # BLD AUTO: 0.7 K/UL (ref 1–4.8)
LYMPHOCYTES NFR BLD: 14.6 % (ref 18–48)
MAGNESIUM SERPL-MCNC: 2 MG/DL (ref 1.6–2.6)
MCH RBC QN AUTO: 29.8 PG (ref 27–31)
MCHC RBC AUTO-ENTMCNC: 31.9 G/DL (ref 32–36)
MCV RBC AUTO: 93 FL (ref 82–98)
MONOCYTES # BLD AUTO: 0.6 K/UL (ref 0.3–1)
MONOCYTES NFR BLD: 11.5 % (ref 4–15)
MV PEAK A VEL: 0.69 M/S
MV PEAK E VEL: 0.34 M/S
MV STENOSIS PRESSURE HALF TIME: 56.4 MS
MV VALVE AREA P 1/2 METHOD: 3.9 CM2
NEUTROPHILS # BLD AUTO: 3.5 K/UL (ref 1.8–7.7)
NEUTROPHILS NFR BLD: 72.7 % (ref 38–73)
NRBC BLD-RTO: 0 /100 WBC
OHS LV EJECTION FRACTION SIMPSONS BIPLANE MOD: 60 %
PHOSPHATE SERPL-MCNC: 3.9 MG/DL (ref 2.7–4.5)
PLATELET # BLD AUTO: 201 K/UL (ref 150–450)
PMV BLD AUTO: 11.1 FL (ref 9.2–12.9)
POTASSIUM SERPL-SCNC: 4.9 MMOL/L (ref 3.5–5.1)
PROT SERPL-MCNC: 6.8 G/DL (ref 6–8.4)
RA PRESSURE ESTIMATED: 3 MMHG
RA VOL SYS: 26.8 ML
RBC # BLD AUTO: 3.93 M/UL (ref 4.6–6.2)
RIGHT ATRIAL AREA: 11.9 CM2
RIGHT ATRIUM VOLUME AREA LENGTH APICAL 4 CHAMBER: 26.29 ML
RV TISSUE DOPPLER FREE WALL SYSTOLIC VELOCITY 1 (APICAL 4 CHAMBER VIEW): 25.83 CM/S
SODIUM SERPL-SCNC: 136 MMOL/L (ref 136–145)
TDI LATERAL: 0.13 M/S
TDI SEPTAL: 0.1 M/S
TDI: 0.12 M/S
TRICUSPID ANNULAR PLANE SYSTOLIC EXCURSION: 1.72 CM
WBC # BLD AUTO: 4.79 K/UL (ref 3.9–12.7)
Z-SCORE OF LEFT VENTRICULAR DIMENSION IN END DIASTOLE: -2.18
Z-SCORE OF LEFT VENTRICULAR DIMENSION IN END SYSTOLE: -1.57

## 2025-01-24 PROCEDURE — 63600175 PHARM REV CODE 636 W HCPCS

## 2025-01-24 PROCEDURE — 80053 COMPREHEN METABOLIC PANEL: CPT

## 2025-01-24 PROCEDURE — 84100 ASSAY OF PHOSPHORUS: CPT

## 2025-01-24 PROCEDURE — 99406 BEHAV CHNG SMOKING 3-10 MIN: CPT | Mod: ,,,

## 2025-01-24 PROCEDURE — 25000003 PHARM REV CODE 250: Performed by: STUDENT IN AN ORGANIZED HEALTH CARE EDUCATION/TRAINING PROGRAM

## 2025-01-24 PROCEDURE — 25000003 PHARM REV CODE 250

## 2025-01-24 PROCEDURE — 99999 PR PBB SHADOW E&M-EST. PATIENT-LVL I: CPT | Mod: PBBFAC,,,

## 2025-01-24 PROCEDURE — 36415 COLL VENOUS BLD VENIPUNCTURE: CPT | Performed by: INTERNAL MEDICINE

## 2025-01-24 PROCEDURE — 11000001 HC ACUTE MED/SURG PRIVATE ROOM

## 2025-01-24 PROCEDURE — 83735 ASSAY OF MAGNESIUM: CPT

## 2025-01-24 PROCEDURE — 36415 COLL VENOUS BLD VENIPUNCTURE: CPT

## 2025-01-24 PROCEDURE — S4991 NICOTINE PATCH NONLEGEND: HCPCS

## 2025-01-24 PROCEDURE — 85025 COMPLETE CBC W/AUTO DIFF WBC: CPT | Performed by: INTERNAL MEDICINE

## 2025-01-24 RX ORDER — DIAZEPAM 10 MG/2ML
2.5 INJECTION INTRAMUSCULAR EVERY 6 HOURS PRN
Status: DISCONTINUED | OUTPATIENT
Start: 2025-01-24 | End: 2025-01-25

## 2025-01-24 RX ORDER — LORAZEPAM 2 MG/ML
1 INJECTION INTRAMUSCULAR ONCE
Status: DISCONTINUED | OUTPATIENT
Start: 2025-01-24 | End: 2025-01-24

## 2025-01-24 RX ORDER — LANOLIN ALCOHOL/MO/W.PET/CERES
1000 CREAM (GRAM) TOPICAL DAILY
Status: DISCONTINUED | OUTPATIENT
Start: 2025-01-25 | End: 2025-01-31 | Stop reason: HOSPADM

## 2025-01-24 RX ORDER — TALC
3 POWDER (GRAM) TOPICAL NIGHTLY
Status: DISCONTINUED | OUTPATIENT
Start: 2025-01-24 | End: 2025-01-31 | Stop reason: HOSPADM

## 2025-01-24 RX ORDER — DIAZEPAM 2 MG/1
2 TABLET ORAL EVERY 8 HOURS
Status: DISCONTINUED | OUTPATIENT
Start: 2025-01-24 | End: 2025-01-25

## 2025-01-24 RX ORDER — ONDANSETRON HYDROCHLORIDE 2 MG/ML
4 INJECTION, SOLUTION INTRAVENOUS EVERY 6 HOURS PRN
Status: DISCONTINUED | OUTPATIENT
Start: 2025-01-24 | End: 2025-01-31 | Stop reason: HOSPADM

## 2025-01-24 RX ORDER — LORAZEPAM 1 MG/1
1 TABLET ORAL ONCE
Status: COMPLETED | OUTPATIENT
Start: 2025-01-24 | End: 2025-01-24

## 2025-01-24 RX ADMIN — NICOTINE 1 PATCH: 21 PATCH, EXTENDED RELEASE TRANSDERMAL at 09:01

## 2025-01-24 RX ADMIN — FOLIC ACID 1 MG: 1 TABLET ORAL at 09:01

## 2025-01-24 RX ADMIN — THIAMINE HYDROCHLORIDE 400 MG: 100 INJECTION, SOLUTION INTRAMUSCULAR; INTRAVENOUS at 03:01

## 2025-01-24 RX ADMIN — GABAPENTIN 300 MG: 300 CAPSULE ORAL at 09:01

## 2025-01-24 RX ADMIN — LORAZEPAM 1 MG: 1 TABLET ORAL at 10:01

## 2025-01-24 RX ADMIN — HEPARIN SODIUM 5000 UNITS: 5000 INJECTION INTRAVENOUS; SUBCUTANEOUS at 03:01

## 2025-01-24 RX ADMIN — HEPARIN SODIUM 5000 UNITS: 5000 INJECTION INTRAVENOUS; SUBCUTANEOUS at 05:01

## 2025-01-24 RX ADMIN — POLYETHYLENE GLYCOL 3350 17 G: 17 POWDER, FOR SOLUTION ORAL at 09:01

## 2025-01-24 RX ADMIN — HEPARIN SODIUM 5000 UNITS: 5000 INJECTION INTRAVENOUS; SUBCUTANEOUS at 11:01

## 2025-01-24 RX ADMIN — THIAMINE HYDROCHLORIDE 400 MG: 100 INJECTION, SOLUTION INTRAMUSCULAR; INTRAVENOUS at 05:01

## 2025-01-24 RX ADMIN — THERA TABS 1 TABLET: TAB at 09:01

## 2025-01-24 RX ADMIN — THIAMINE HYDROCHLORIDE 400 MG: 100 INJECTION, SOLUTION INTRAMUSCULAR; INTRAVENOUS at 11:01

## 2025-01-24 NOTE — CONSULTS
[facility] - Telemetry  Podiatry  Consult Note    Patient Name: Solomon Terrazas  MRN: [id number]  Admission Date: 1/20/2025  Hospital Length of Stay: 3 days  Attending Physician: Rich Fung MD  Primary Care Provider: [facility]     Inpatient consult to Podiatry  Consult performed by: Natalia Bryan MD  Consult ordered by: Mak Rdz MD  Reason for consult: elongated nails        Subjective:     History of Present Illness:  Solomon Terrazas is a 74 y.o. male with a PMHx of Tobacco Use disorder and Alcohol Use disorder. The patient presented for evaluation of weakness/dizziness. Podiatry consulted for excessively large toe nail. Patient awake, however no verbal replies given during interview. Patient on b/l wrist restraints.     Scheduled Meds:   cyanocobalamin  1,000 mcg Intramuscular Q30 Days    folic acid  1 mg Oral Daily    gabapentin  300 mg Oral BID    heparin (porcine)  5,000 Units Subcutaneous Q8H    multivitamin  1 tablet Oral Daily    nicotine  1 patch Transdermal Daily    polyethylene glycol  17 g Oral Daily    [START ON 1/28/2025] thiamine (B-1) injection  200 mg Intravenous Daily    thiamine (B-1) injection  400 mg Intravenous Q8H     Continuous Infusions:  PRN Meds:  Current Facility-Administered Medications:     acetaminophen, 650 mg, Oral, Q4H PRN    dextrose 50%, 12.5 g, Intravenous, PRN    dextrose 50%, 25 g, Intravenous, PRN    glucagon (human recombinant), 1 mg, Intramuscular, PRN    glucose, 16 g, Oral, PRN    glucose, 24 g, Oral, PRN    melatonin, 6 mg, Oral, Nightly PRN    naloxone, 0.02 mg, Intravenous, PRN    sodium chloride 0.9%, 10 mL, Intravenous, Q12H PRN    Review of patient's allergies indicates:  No Known Allergies     History reviewed. No pertinent past medical history.  History reviewed. No pertinent surgical history.    Family History    None       Tobacco Use    Smoking status: Every Day     Current packs/day: 0.50     Types: Cigarettes    Smokeless tobacco: Never     Tobacco comments:     smokes 3 or 4 cigarettes per day   Substance and Sexual Activity    Alcohol use: Yes     Alcohol/week: 2.0 - 4.0 standard drinks of alcohol     Types: 2 - 4 Cans of beer per week     Comment: Pt reports he drinks gin everyday    Drug use: Not Currently    Sexual activity: Not on file     Review of Systems   Reason unable to perform ROS: patient did not reply during interview.     Objective:     Vital Signs (Most Recent):  Temp: 98.4 °F (36.9 °C) (01/24/25 0742)  Pulse: 76 (01/24/25 0742)  Resp: 20 (01/24/25 0742)  BP: (!) 148/81 (01/24/25 0742)  SpO2: 97 % (01/24/25 0742) Vital Signs (24h Range):  Temp:  [97.7 °F (36.5 °C)-98.6 °F (37 °C)] 98.4 °F (36.9 °C)  Pulse:  [] 76  Resp:  [17-20] 20  SpO2:  [92 %-97 %] 97 %  BP: (142-170)/(65-87) 148/81     Weight: 57.3 kg (126 lb 5.2 oz)  Body mass index is 19.21 kg/m².    Foot Exam    General  Orientation: unable to assess       Right Foot/Ankle     Inspection and Palpation  Ecchymosis: none  Swelling: none   Skin Exam: skin intact;     Neurovascular  Dorsalis pedis: 2+  Posterior tibial: 2+    Comments  No acute distress or pain. Non tender to palpation throughout foot. No open wounds or gross deformity. Nails are large and thickened.     Left Foot/Ankle      Inspection and Palpation  Ecchymosis: none  Swelling: none   Skin Exam: skin intact;     Neurovascular  Dorsalis pedis: 2+  Posterior tibial: 2+    Comments  No acute distress or pain. Non tender to palpation throughout foot. No open wounds or gross deformity. Nails are large and thickened.   Clinical media:  Right    left        Laboratory:  All pertinent labs reviewed within the last 24 hours.    Diagnostic Results:  I have reviewed all pertinent imaging results/findings within the past 24 hours.  Assessment/Plan:     Derm  Onychogryphosis  75 y/o male presenting for evaluation of weakness and dizziness. Podiatry consulted for long and hardened toe nails. No open wounds or lesions.  Patient did not reply during interview, no other podiatry issues noted at this time.    Plan:  - Nails trimmed today without incident. Full procedure note to follow  - Patient to follow up PRN  - Podiatry will sign off. Please reach out with any further questions.         Thank you for your consult. I will sign off. Please contact us if you have any additional questions.    Natalia Bryan MD  Podiatry  [facility]

## 2025-01-24 NOTE — HPI
Solomon Terrazas is a 74 y.o. male with a PMHx of Tobacco Use disorder and Alcohol Use disorder. The patient presented for evaluation of weakness/dizziness. Podiatry consulted for excessively large toe nail. Patient awake, however no verbal replies given during interview. Patient on b/l wrist restraints.

## 2025-01-24 NOTE — SUBJECTIVE & OBJECTIVE
Scheduled Meds:   cyanocobalamin  1,000 mcg Intramuscular Q30 Days    folic acid  1 mg Oral Daily    gabapentin  300 mg Oral BID    heparin (porcine)  5,000 Units Subcutaneous Q8H    multivitamin  1 tablet Oral Daily    nicotine  1 patch Transdermal Daily    polyethylene glycol  17 g Oral Daily    [START ON 1/28/2025] thiamine (B-1) injection  200 mg Intravenous Daily    thiamine (B-1) injection  400 mg Intravenous Q8H     Continuous Infusions:  PRN Meds:  Current Facility-Administered Medications:     acetaminophen, 650 mg, Oral, Q4H PRN    dextrose 50%, 12.5 g, Intravenous, PRN    dextrose 50%, 25 g, Intravenous, PRN    glucagon (human recombinant), 1 mg, Intramuscular, PRN    glucose, 16 g, Oral, PRN    glucose, 24 g, Oral, PRN    melatonin, 6 mg, Oral, Nightly PRN    naloxone, 0.02 mg, Intravenous, PRN    sodium chloride 0.9%, 10 mL, Intravenous, Q12H PRN    Review of patient's allergies indicates:  No Known Allergies     History reviewed. No pertinent past medical history.  History reviewed. No pertinent surgical history.    Family History    None       Tobacco Use    Smoking status: Every Day     Current packs/day: 0.50     Types: Cigarettes    Smokeless tobacco: Never    Tobacco comments:     smokes 3 or 4 cigarettes per day   Substance and Sexual Activity    Alcohol use: Yes     Alcohol/week: 2.0 - 4.0 standard drinks of alcohol     Types: 2 - 4 Cans of beer per week     Comment: Pt reports he drinks gin everyday    Drug use: Not Currently    Sexual activity: Not on file     Review of Systems   Reason unable to perform ROS: patient did not reply during interview.     Objective:     Vital Signs (Most Recent):  Temp: 98.4 °F (36.9 °C) (01/24/25 0742)  Pulse: 76 (01/24/25 0742)  Resp: 20 (01/24/25 0742)  BP: (!) 148/81 (01/24/25 0742)  SpO2: 97 % (01/24/25 0742) Vital Signs (24h Range):  Temp:  [97.7 °F (36.5 °C)-98.6 °F (37 °C)] 98.4 °F (36.9 °C)  Pulse:  [] 76  Resp:  [17-20] 20  SpO2:  [92 %-97 %] 97  %  BP: (142-170)/(65-87) 148/81     Weight: 57.3 kg (126 lb 5.2 oz)  Body mass index is 19.21 kg/m².    Foot Exam    General  Orientation: unable to assess       Right Foot/Ankle     Inspection and Palpation  Ecchymosis: none  Swelling: none   Skin Exam: skin intact;     Neurovascular  Dorsalis pedis: 2+  Posterior tibial: 2+    Comments  No acute distress or pain. Non tender to palpation throughout foot. No open wounds or gross deformity. Nails are large and thickened.     Left Foot/Ankle      Inspection and Palpation  Ecchymosis: none  Swelling: none   Skin Exam: skin intact;     Neurovascular  Dorsalis pedis: 2+  Posterior tibial: 2+    Comments  No acute distress or pain. Non tender to palpation throughout foot. No open wounds or gross deformity. Nails are large and thickened.   Clinical media:  Right    left        Laboratory:  All pertinent labs reviewed within the last 24 hours.    Diagnostic Results:  I have reviewed all pertinent imaging results/findings within the past 24 hours.

## 2025-01-24 NOTE — PROCEDURES
Routine Foot Care    Date/Time: 1/24/2025 1:15 PM    Performed by: Edgardo Lanza DPM  Authorized by: Brian Donaldson DPM      Nail Care Type:  Debride  Location(s): All  (Left 1st Toe, Left 3rd Toe, Left 2nd Toe, Left 4th Toe, Left 5th Toe, Right 1st Toe, Right 2nd Toe, Right 3rd Toe, Right 4th Toe and Right 5th Toe)  Patient tolerance:  Patient tolerated the procedure well with no immediate complications     Nails 1-10 debrided without incident with sterile nail nippers.

## 2025-01-24 NOTE — PLAN OF CARE
SW attempted to meet with pt at bedside, pt was not oriented to complete DCA. SW attempted to again call hannah Seals [phone] there was no answer, sw left a VM requesting a return call. Cm will continue to follow pt through transitions of care and assist with any discharge needs.    2:25pm SW attempted to call hannah Seals [phone] there was no answer and sw was unable to leave a VM. SW called [phone] and pt's grandson answered the phone. Per family pt lives at home alone, family could not answer sw additional questions. SW asked that the son call back. Family stated that they were aware that the pt was in the hospital, sw notified family that medical team had been trying to contact the family. Family will make an effort to come to the hospital but could not tell sw when.    Estevan Sams, MSW  [phone]    No future appointments.     01/24/25 1035   Discharge Plan   Discharge Plan A [facility]

## 2025-01-24 NOTE — PLAN OF CARE
1900 Pt appeared to be in no distress. Reported no pain.     2100 accu ck: none    Tele: d/c    Bed in lowest position, wheels locked, non skid socks, ID band worn, personal items and call bell with in reach, bed alarm set.

## 2025-01-24 NOTE — PLAN OF CARE
01/24/25 1625   Nurse Notification   Charge Nurse Notified? Yes   Name of Charge Nurse juarez   Bedside Nurse Notified? Yes   Name of Bedside Nurse allen   Nurse Notfication Method Secure Chat   Nurse Notified Of   (mews score 5, temp 101.3, p-106, r-22, bp 166-98)

## 2025-01-24 NOTE — PROGRESS NOTES
Smoking cessation education note; Pt states that he started smoking at the age of 8. He is vague regarding amount / number of cigarettes he smokes daily. Pt denies nicotine withdrawal symptoms with patch in use. Will follow up for additional smoking cessation education when patient condition improves.

## 2025-01-24 NOTE — PLAN OF CARE
CELY spoke with pt's son Bozena [phone] to complete DCA. Per family pt lives at home alone and will use a cane to complete his ADLs. Pt does not drive but will ride his bike around to get to appointments. Family stated that they will be coming to the hospital tomorrow to see the pt. Family stated that if pt goes home they will transport him back. White board updated with CM name and contact information.  Discharge brochure provided.  Pt encouraged to call with any questions or concerns.  Cm will continue to follow pt through transitions of care and assist with any discharge needs.    Estevanlaila Sams, MSW  [phone]     01/24/25 1435   Discharge Assessment   Assessment Type Discharge Planning Assessment   Confirmed/corrected address, phone number and insurance Yes   Confirmed Demographics Correct on Facesheet   Source of Information family   When was your last doctors appointment?   (not sure)   Communicated CARLOS EDUARDO with patient/caregiver Yes   Reason For Admission Syncope and collapse   People in Home alone   Facility Arrived From: home   Do you expect to return to your current living situation? No   Do you have help at home or someone to help you manage your care at home? No   Prior to hospitilization cognitive status: Unable to Assess   Current cognitive status: Unable to Assess   Walking or Climbing Stairs Difficulty no   Dressing/Bathing Difficulty no   Do you have any problems with: Errands/Grocery   Home Accessibility wheelchair accessible   Home Layout Able to live on 1st floor   Equipment Currently Used at Home cane, straight   Readmission within 30 days? No   Patient currently being followed by outpatient case management? No   Do you currently have service(s) that help you manage your care at home? No   Do you take prescription medications? Yes   Do you have prescription coverage? Yes   Coverage PHN   Do you have any problems affording any of your prescribed medications? No   Is the patient taking medications as  prescribed? yes   Who is going to help you get home at discharge? Bozena's number is [phone]   Are you on dialysis? No   Do you take coumadin? No   DME Needed Upon Discharge  other (see comments)  (tbd)   Discharge Plan discussed with: Adult children   Transition of Care Barriers None

## 2025-01-24 NOTE — PROGRESS NOTES
"[facility] Medicine Progress Note    Admitting Team: [facility] Hospitalist Team A  Attending Physician: Stephanie Adhikari MD  Resident: Dru  Intern: Louann     Date of Admit: 1/20/2025    Subjective/Interval History      Family not available w/ SW yesterday on phone call. Remains confused this AM. Cannot relay details of prior to hospitalization aside that he lives in [place]. Disoriented to date/time/situation/location. Complains of nausea this AM.        Objective     Physical Examination:  BP (!) 146/87 (Patient Position: Lying)   Pulse 78   Temp 97.9 °F (36.6 °C) (Oral)   Resp 18   Ht 5' 8" (1.727 m)   Wt 57.3 kg (126 lb 5.2 oz)   SpO2 (!) 94%   BMI 19.21 kg/m²    Physical Exam  Vitals and nursing note reviewed.   Constitutional:       Appearance: Normal appearance.      Comments: Disoriented on exam   HENT:      Head: Normocephalic.      Nose: Nose normal.      Mouth/Throat:      Mouth: Mucous membranes are moist.   Eyes:      Extraocular Movements: Extraocular movements intact.      Pupils: Pupils are equal, round, and reactive to light.   Cardiovascular:      Rate and Rhythm: Normal rate and regular rhythm.      Pulses: Normal pulses.      Heart sounds: Normal heart sounds.   Pulmonary:      Effort: Pulmonary effort is normal.      Breath sounds: Normal breath sounds.   Abdominal:      General: Abdomen is flat.      Palpations: Abdomen is soft.   Musculoskeletal:      Comments: Patient in soft restraints   Skin:     General: Skin is warm.      Capillary Refill: Capillary refill takes less than 2 seconds.   Neurological:      General: No focal deficit present.      Mental Status: Mental status is at baseline. He is disoriented.   Psychiatric:         Cognition and Memory: Cognition is impaired. Memory is impaired.       Intake/Output:    Intake/Output Summary (Last 24 hours) at 1/24/2025 0706  Last data filed at 1/24/2025 0546  Gross per 24 hour   Intake 419.56 ml   Output 1100 ml   Net -680.44 " ml     Net IO Since Admission: 508.48 mL [01/24/25 0706]    Laboratory data: reviewed     Microbiology data: reviewed   Blood cultures NG2D    EKG data: reviewed   Sinus Bradycardia     Radiology data: reviewed   US Liver with Doppler (xpd)   Final Result      No definite acute sonographic findings.      Additional details, as provided in the body of report.         Electronically signed by: Lorne Coleman   Date:    01/21/2025   Time:    12:52      CT Head Without Contrast   Final Result      1. No CT evidence of acute intracranial abnormality. Clinical correlation and further evaluation as warranted.   2. Generalized cerebral volume loss and findings suggestive of chronic microvascular ischemic change.   3. Paranasal sinus disease as above.         Electronically signed by: Paloma High MD   Date:    01/21/2025   Time:    01:22      X-Ray Chest AP Portable   Final Result      No acute process seen.         Electronically signed by: Lewis Samano MD   Date:    01/20/2025   Time:    10:58            Current Medications:     Infusions:         Scheduled:   cyanocobalamin  1,000 mcg Intramuscular Q30 Days    folic acid  1 mg Oral Daily    gabapentin  300 mg Oral BID    heparin (porcine)  5,000 Units Subcutaneous Q8H    multivitamin  1 tablet Oral Daily    nicotine  1 patch Transdermal Daily    polyethylene glycol  17 g Oral Daily    [START ON 1/28/2025] thiamine (B-1) injection  200 mg Intravenous Daily    thiamine (B-1) injection  400 mg Intravenous Q8H        PRN:    Current Facility-Administered Medications:     acetaminophen, 650 mg, Oral, Q4H PRN    dextrose 50%, 12.5 g, Intravenous, PRN    dextrose 50%, 25 g, Intravenous, PRN    glucagon (human recombinant), 1 mg, Intramuscular, PRN    glucose, 16 g, Oral, PRN    glucose, 24 g, Oral, PRN    melatonin, 6 mg, Oral, Nightly PRN    naloxone, 0.02 mg, Intravenous, PRN    sodium chloride 0.9%, 10 mL, Intravenous, Q12H PRN      Assessment/Plan:     Solomon Terrazas is a  74 y.o. male with a PMHx of Tobacco Use disorder and Alcohol Use disorder. The patient presented on 1/20/2025 for REDD and Presyncope now admitted for IV hydration and further workup.     Encephalopathy  Alcohol use disorder  - unclear baseline mental status, attempting to contact family  - wax/waning; consideration for wernicke's encephalopathy given reported alcohol use history vs delirium  - CT head w/ atrophic changes; electrolytes stable  - on gabapentin taper for withdrawal considerations to finish today, suspect may still have component of withdrawal that may be precipitating delirium  - continue CIWA  - Thiamine repletion w/ IV for wernicke's consideration.     Presyncope vs Syncope   - Unclear story; patient's story does not reveal any true LOC  - Suspect vasovagal etiology potentially related to dehydration; however unclear  - May be due to alcohol intoxication as patient has history of significant alcohol use  - CT Head negative for acute abnormalities   - TTE ordered   - Continue to monitor on telemetry    Stage 1 Acute Kidney Injury; resolved  - Creatinine of 1.7 on admission; appears baseline is 1.3  - Urine studies ordered to evaluate etiology   -likely 2/2 to pre-renal etiology  - received gentle IVF initially  - Holding nephrotoxic meds; Monitor with daily CMP     Tobacco Use Disorder  - patient on smoking cessation  -nicotine patches while inpatient.     Normocytic Anemia  Hypovitaminosis B12  - stable, ferritin elevated w/ low iron sat; consider mixed picture w/ nutritional deficiencies vs chronic inflammation  - repleting B12 IM, start PO supplementation    Hyperkalemia; resolved  -mild at 5.2 w/o evidence of EKG changes  -monitoring w/ daily CMP    Healthcare maintenance   -primary care provider: None     Diet: Adult regular   VTE PPx: Heparin  Code Status: Full Code    Dispo: Pending clinical improvement vs placement  Estimated LOS: >72 hrs.     Joao Malone DO  [facility]  Med[facility]/Emergency Medicine -V

## 2025-01-24 NOTE — ASSESSMENT & PLAN NOTE
73 y/o male presenting for evaluation of weakness and dizziness. Podiatry consulted for long and hardened toe nails. No open wounds or lesions. Patient did not reply during interview, no other podiatry issues noted at this time.    Plan:  - Will trim the nail as a courtesy  - Recommend patient follow up in outpatient setting for podiatric care

## 2025-01-25 LAB
ALBUMIN SERPL BCP-MCNC: 3.4 G/DL (ref 3.5–5.2)
ALP SERPL-CCNC: 69 U/L (ref 40–150)
ALT SERPL W/O P-5'-P-CCNC: 17 U/L (ref 10–44)
ANION GAP SERPL CALC-SCNC: 11 MMOL/L (ref 8–16)
AST SERPL-CCNC: 38 U/L (ref 10–40)
BACTERIA BLD CULT: NORMAL
BACTERIA BLD CULT: NORMAL
BASOPHILS # BLD AUTO: 0.02 K/UL (ref 0–0.2)
BASOPHILS NFR BLD: 0.4 % (ref 0–1.9)
BILIRUB SERPL-MCNC: 1.1 MG/DL (ref 0.1–1)
BUN SERPL-MCNC: 30 MG/DL (ref 8–23)
CALCIUM SERPL-MCNC: 10.3 MG/DL (ref 8.7–10.5)
CHLORIDE SERPL-SCNC: 111 MMOL/L (ref 95–110)
CHLORIDE UR-SCNC: 100 MMOL/L (ref 25–200)
CO2 SERPL-SCNC: 17 MMOL/L (ref 23–29)
CREAT SERPL-MCNC: 1.7 MG/DL (ref 0.5–1.4)
DIFFERENTIAL METHOD BLD: ABNORMAL
EOSINOPHIL # BLD AUTO: 0 K/UL (ref 0–0.5)
EOSINOPHIL NFR BLD: 0.4 % (ref 0–8)
ERYTHROCYTE [DISTWIDTH] IN BLOOD BY AUTOMATED COUNT: 12.1 % (ref 11.5–14.5)
EST. GFR  (NO RACE VARIABLE): 41 ML/MIN/1.73 M^2
GLUCOSE SERPL-MCNC: 93 MG/DL (ref 70–110)
HCT VFR BLD AUTO: 36.2 % (ref 40–54)
HGB BLD-MCNC: 11.5 G/DL (ref 14–18)
HIV 1+2 AB+HIV1 P24 AG SERPL QL IA: NORMAL
IMM GRANULOCYTES # BLD AUTO: 0.01 K/UL (ref 0–0.04)
IMM GRANULOCYTES NFR BLD AUTO: 0.2 % (ref 0–0.5)
LYMPHOCYTES # BLD AUTO: 0.8 K/UL (ref 1–4.8)
LYMPHOCYTES NFR BLD: 14.4 % (ref 18–48)
MAGNESIUM SERPL-MCNC: 2.1 MG/DL (ref 1.6–2.6)
MCH RBC QN AUTO: 30.4 PG (ref 27–31)
MCHC RBC AUTO-ENTMCNC: 31.8 G/DL (ref 32–36)
MCV RBC AUTO: 96 FL (ref 82–98)
MONOCYTES # BLD AUTO: 0.7 K/UL (ref 0.3–1)
MONOCYTES NFR BLD: 12.3 % (ref 4–15)
NEUTROPHILS # BLD AUTO: 3.8 K/UL (ref 1.8–7.7)
NEUTROPHILS NFR BLD: 72.3 % (ref 38–73)
NRBC BLD-RTO: 0 /100 WBC
PHOSPHATE SERPL-MCNC: 4.3 MG/DL (ref 2.7–4.5)
PLATELET # BLD AUTO: 196 K/UL (ref 150–450)
PMV BLD AUTO: 11.1 FL (ref 9.2–12.9)
POTASSIUM SERPL-SCNC: 5.2 MMOL/L (ref 3.5–5.1)
POTASSIUM UR-SCNC: 86 MMOL/L (ref 15–95)
PROT SERPL-MCNC: 7.4 G/DL (ref 6–8.4)
RBC # BLD AUTO: 3.78 M/UL (ref 4.6–6.2)
SODIUM SERPL-SCNC: 139 MMOL/L (ref 136–145)
SODIUM UR-SCNC: 93 MMOL/L (ref 20–250)
TREPONEMA PALLIDUM IGG+IGM AB [PRESENCE] IN SERUM OR PLASMA BY IMMUNOASSAY: NONREACTIVE
WBC # BLD AUTO: 5.29 K/UL (ref 3.9–12.7)

## 2025-01-25 PROCEDURE — 25000003 PHARM REV CODE 250

## 2025-01-25 PROCEDURE — 84300 ASSAY OF URINE SODIUM: CPT | Performed by: STUDENT IN AN ORGANIZED HEALTH CARE EDUCATION/TRAINING PROGRAM

## 2025-01-25 PROCEDURE — 80053 COMPREHEN METABOLIC PANEL: CPT

## 2025-01-25 PROCEDURE — 63600175 PHARM REV CODE 636 W HCPCS

## 2025-01-25 PROCEDURE — 11000001 HC ACUTE MED/SURG PRIVATE ROOM

## 2025-01-25 PROCEDURE — 84100 ASSAY OF PHOSPHORUS: CPT

## 2025-01-25 PROCEDURE — 84133 ASSAY OF URINE POTASSIUM: CPT | Performed by: STUDENT IN AN ORGANIZED HEALTH CARE EDUCATION/TRAINING PROGRAM

## 2025-01-25 PROCEDURE — S4991 NICOTINE PATCH NONLEGEND: HCPCS

## 2025-01-25 PROCEDURE — 83735 ASSAY OF MAGNESIUM: CPT

## 2025-01-25 PROCEDURE — 87389 HIV-1 AG W/HIV-1&-2 AB AG IA: CPT | Performed by: STUDENT IN AN ORGANIZED HEALTH CARE EDUCATION/TRAINING PROGRAM

## 2025-01-25 PROCEDURE — 82436 ASSAY OF URINE CHLORIDE: CPT | Performed by: STUDENT IN AN ORGANIZED HEALTH CARE EDUCATION/TRAINING PROGRAM

## 2025-01-25 PROCEDURE — 63600175 PHARM REV CODE 636 W HCPCS: Performed by: INTERNAL MEDICINE

## 2025-01-25 PROCEDURE — 86593 SYPHILIS TEST NON-TREP QUANT: CPT | Performed by: STUDENT IN AN ORGANIZED HEALTH CARE EDUCATION/TRAINING PROGRAM

## 2025-01-25 PROCEDURE — 85025 COMPLETE CBC W/AUTO DIFF WBC: CPT

## 2025-01-25 PROCEDURE — 63600175 PHARM REV CODE 636 W HCPCS: Performed by: STUDENT IN AN ORGANIZED HEALTH CARE EDUCATION/TRAINING PROGRAM

## 2025-01-25 PROCEDURE — 25000003 PHARM REV CODE 250: Performed by: STUDENT IN AN ORGANIZED HEALTH CARE EDUCATION/TRAINING PROGRAM

## 2025-01-25 PROCEDURE — 36415 COLL VENOUS BLD VENIPUNCTURE: CPT | Performed by: STUDENT IN AN ORGANIZED HEALTH CARE EDUCATION/TRAINING PROGRAM

## 2025-01-25 RX ORDER — ENOXAPARIN SODIUM 100 MG/ML
30 INJECTION SUBCUTANEOUS EVERY 24 HOURS
Status: DISCONTINUED | OUTPATIENT
Start: 2025-01-25 | End: 2025-01-27

## 2025-01-25 RX ORDER — SODIUM CHLORIDE, SODIUM LACTATE, POTASSIUM CHLORIDE, CALCIUM CHLORIDE 600; 310; 30; 20 MG/100ML; MG/100ML; MG/100ML; MG/100ML
INJECTION, SOLUTION INTRAVENOUS CONTINUOUS
Status: ACTIVE | OUTPATIENT
Start: 2025-01-25 | End: 2025-01-25

## 2025-01-25 RX ORDER — ENOXAPARIN SODIUM 100 MG/ML
40 INJECTION SUBCUTANEOUS EVERY 24 HOURS
Status: DISCONTINUED | OUTPATIENT
Start: 2025-01-25 | End: 2025-01-25

## 2025-01-25 RX ADMIN — GABAPENTIN 300 MG: 300 CAPSULE ORAL at 08:01

## 2025-01-25 RX ADMIN — HEPARIN SODIUM 5000 UNITS: 5000 INJECTION INTRAVENOUS; SUBCUTANEOUS at 06:01

## 2025-01-25 RX ADMIN — THIAMINE HYDROCHLORIDE 400 MG: 100 INJECTION, SOLUTION INTRAMUSCULAR; INTRAVENOUS at 01:01

## 2025-01-25 RX ADMIN — NICOTINE 1 PATCH: 21 PATCH, EXTENDED RELEASE TRANSDERMAL at 08:01

## 2025-01-25 RX ADMIN — Medication 3 MG: at 10:01

## 2025-01-25 RX ADMIN — THERA TABS 1 TABLET: TAB at 08:01

## 2025-01-25 RX ADMIN — THIAMINE HYDROCHLORIDE 400 MG: 100 INJECTION, SOLUTION INTRAMUSCULAR; INTRAVENOUS at 10:01

## 2025-01-25 RX ADMIN — CYANOCOBALAMIN TAB 1000 MCG 1000 MCG: 1000 TAB at 08:01

## 2025-01-25 RX ADMIN — FOLIC ACID 1 MG: 1 TABLET ORAL at 08:01

## 2025-01-25 RX ADMIN — GABAPENTIN 300 MG: 300 CAPSULE ORAL at 10:01

## 2025-01-25 RX ADMIN — THIAMINE HYDROCHLORIDE 400 MG: 100 INJECTION, SOLUTION INTRAMUSCULAR; INTRAVENOUS at 06:01

## 2025-01-25 RX ADMIN — SODIUM CHLORIDE, SODIUM LACTATE, POTASSIUM CHLORIDE, AND CALCIUM CHLORIDE: .6; .31; .03; .02 INJECTION, SOLUTION INTRAVENOUS at 12:01

## 2025-01-25 RX ADMIN — ENOXAPARIN SODIUM 30 MG: 30 INJECTION SUBCUTANEOUS at 04:01

## 2025-01-25 RX ADMIN — POLYETHYLENE GLYCOL 3350 17 G: 17 POWDER, FOR SOLUTION ORAL at 08:01

## 2025-01-25 NOTE — PLAN OF CARE
1900 Pt appeared to be in no distress. Reported no pain.     2100 accu ck: none    Tele: none     Bed in lowest position, wheels locked, non skid socks, ID band worn, personal items and call bell with in reach, bed alarm set.

## 2025-01-25 NOTE — PROGRESS NOTES
"[facility] Medicine Progress Note    Admitting Team: [facility] Hospitalist Team A  Attending Physician: Stephanie Adhikari MD  Resident: Dru  Intern: Louann     Date of Admit: 1/20/2025    Subjective/Interval History      Family planning to visit today. Patient clearer this AM, still disoriented but improving.        Objective     Physical Examination:  /77 (Patient Position: Lying)   Pulse 78   Temp 98.1 °F (36.7 °C) (Oral)   Resp 18   Ht 5' 8" (1.727 m)   Wt 59 kg (130 lb)   SpO2 97%   BMI 19.77 kg/m²    Physical Exam  Vitals and nursing note reviewed.   Constitutional:       Appearance: Normal appearance.      Comments: Disoriented on exam   HENT:      Head: Normocephalic.      Right Ear: External ear normal.      Left Ear: External ear normal.      Nose: Nose normal.      Mouth/Throat:      Mouth: Mucous membranes are moist.   Eyes:      Extraocular Movements: Extraocular movements intact.      Pupils: Pupils are equal, round, and reactive to light.   Cardiovascular:      Rate and Rhythm: Normal rate and regular rhythm.      Pulses: Normal pulses.      Heart sounds: Normal heart sounds.   Pulmonary:      Effort: Pulmonary effort is normal.      Breath sounds: Normal breath sounds.   Abdominal:      General: Abdomen is flat.      Palpations: Abdomen is soft.   Musculoskeletal:         General: Normal range of motion.      Comments: Patient in soft restraints   Skin:     General: Skin is warm.      Capillary Refill: Capillary refill takes less than 2 seconds.   Neurological:      General: No focal deficit present.      Mental Status: Mental status is at baseline. He is disoriented.   Psychiatric:         Cognition and Memory: Cognition is impaired. Memory is impaired.       Intake/Output:    Intake/Output Summary (Last 24 hours) at 1/25/2025 0659  Last data filed at 1/25/2025 0605  Gross per 24 hour   Intake 429 ml   Output 450 ml   Net -21 ml     Net IO Since Admission: 487.48 mL [01/25/25 " 0659]    Laboratory data: reviewed     Microbiology data: reviewed   Blood cultures NG2D    EKG data: reviewed   Sinus Bradycardia     Radiology data: reviewed   US Liver with Doppler (xpd)   Final Result      No definite acute sonographic findings.      Additional details, as provided in the body of report.         Electronically signed by: Lorne Coleman   Date:    01/21/2025   Time:    12:52      CT Head Without Contrast   Final Result      1. No CT evidence of acute intracranial abnormality. Clinical correlation and further evaluation as warranted.   2. Generalized cerebral volume loss and findings suggestive of chronic microvascular ischemic change.   3. Paranasal sinus disease as above.         Electronically signed by: Paloma High MD   Date:    01/21/2025   Time:    01:22      X-Ray Chest AP Portable   Final Result      No acute process seen.         Electronically signed by: Lewis Samano MD   Date:    01/20/2025   Time:    10:58            Current Medications:     Infusions:         Scheduled:   cyanocobalamin  1,000 mcg Oral Daily    diazePAM  2 mg Oral Q8H    folic acid  1 mg Oral Daily    gabapentin  300 mg Oral BID    heparin (porcine)  5,000 Units Subcutaneous Q8H    melatonin  3 mg Oral Nightly    multivitamin  1 tablet Oral Daily    nicotine  1 patch Transdermal Daily    polyethylene glycol  17 g Oral Daily    [START ON 1/28/2025] thiamine (B-1) injection  200 mg Intravenous Daily    thiamine (B-1) injection  400 mg Intravenous Q8H        PRN:    Current Facility-Administered Medications:     acetaminophen, 650 mg, Oral, Q4H PRN    dextrose 50%, 12.5 g, Intravenous, PRN    dextrose 50%, 25 g, Intravenous, PRN    diazePAM, 2.5 mg, Intravenous, Q6H PRN    glucagon (human recombinant), 1 mg, Intramuscular, PRN    glucose, 16 g, Oral, PRN    glucose, 24 g, Oral, PRN    naloxone, 0.02 mg, Intravenous, PRN    ondansetron, 4 mg, Intravenous, Q6H PRN    sodium chloride 0.9%, 10 mL, Intravenous, Q12H  PRN      Assessment/Plan:     Solomon Terrazas is a 74 y.o. male with a PMHx of Tobacco Use disorder and Alcohol Use disorder. The patient presented on 1/20/2025 for REDD and Presyncope now admitted for IV hydration and further workup.     Encephalopathy  Alcohol use disorder  - unclear baseline mental status, attempting to contact family  - wax/waning; consideration for wernicke's encephalopathy given reported alcohol use history vs delirium  - CT head w/ atrophic changes; electrolytes stable  - on gabapentin taper for withdrawal considerations to finish today, suspect may still have component of withdrawal that may be precipitating delirium  - given dose of lorazepam yesterday PM w/ valium taper ordered but did not receive any doses 2/2 sedation. Discontinued today.   - continue CIWA  - Thiamine repletion w/ IV for wernicke's consideration.     Presyncope vs Syncope   - Unclear story; patient's story does not reveal any true LOC  - Suspect vasovagal etiology potentially related to dehydration; however unclear  - May be due to alcohol intoxication as patient has history of significant alcohol use  - CT Head negative for acute abnormalities   - TTE ordered   - Continue to monitor on telemetry    Stage 1 Acute Kidney Injury  Metabolic acidosis  - Creatinine of 1.7 on admission; appears baseline is 1.3  - Urine studies ordered to evaluate etiology   -likely 2/2 to pre-renal etiology  - received gentle IVF initially  - Holding nephrotoxic meds; Monitor with daily CMP  - 1/25 Creatinine bump to 1.7 from 1.4, appears hypovolemic; will give gentle fluids and encourage hydration  - bicarb persistently low, with infrequent mild hyperK and hyperchloremia; will obtain urine studies to pursue consideration of RTA.      Tobacco Use Disorder  - patient on smoking cessation  -nicotine patches while inpatient.     Normocytic Anemia  Hypovitaminosis B12  - stable, ferritin elevated w/ low iron sat; consider mixed picture w/  nutritional deficiencies vs chronic inflammation  - repleting B12 IM, start PO supplementation    Hyperkalemia; resolved  -mild at 5.2 w/o evidence of EKG changes  -monitoring w/ daily CMP    Healthcare maintenance   -primary care provider: None     Diet: Adult regular   VTE PPx: Heparin  Code Status: Full Code    Dispo: Pending clinical improvement vs placement  Estimated LOS: >72 hrs.     Joao Malone DO  [facility]/Emergency Medicine HO-V

## 2025-01-25 NOTE — PROGRESS NOTES
Pharmacist Renal Dose Adjustment Note    Solomon Terrazas is a 74 y.o. male being treated with the medication enoxaparin     Patient Data:    Vital Signs (Most Recent):  Temp: 98.2 °F (36.8 °C) (01/25/25 0801)  Pulse: 69 (01/25/25 0801)  Resp: 18 (01/25/25 0801)  BP: 137/74 (01/25/25 0801)  SpO2: 97 % (01/25/25 0801) Vital Signs (72h Range):  Temp:  [97.7 °F (36.5 °C)-101.3 °F (38.5 °C)]   Pulse:  []   Resp:  [17-22]   BP: (122-172)/(65-98)   SpO2:  [92 %-98 %]      Recent Labs   Lab 01/23/25  0633 01/24/25  0633 01/25/25  0755   CREATININE 1.3 1.4 1.7*     Serum creatinine: 1.7 mg/dL (H) 01/25/25 0755  Estimated creatinine clearance: 30.8 mL/min (A)    Medication:enoxaparin dose: 40 mg frequency Q 24 hrs will be changed to medication:enoxaparin dose:30 mg frequency:Q 24 hrs    Pharmacist's Name: Anabell Van  Pharmacist's Extension: [phone]

## 2025-01-26 LAB
ALBUMIN SERPL BCP-MCNC: 2.9 G/DL (ref 3.5–5.2)
ALP SERPL-CCNC: 61 U/L (ref 40–150)
ALT SERPL W/O P-5'-P-CCNC: 12 U/L (ref 10–44)
ANION GAP SERPL CALC-SCNC: 5 MMOL/L (ref 8–16)
AST SERPL-CCNC: 28 U/L (ref 10–40)
BACTERIA #/AREA URNS HPF: ABNORMAL /HPF
BASOPHILS # BLD AUTO: 0.01 K/UL (ref 0–0.2)
BASOPHILS NFR BLD: 0.3 % (ref 0–1.9)
BILIRUB SERPL-MCNC: 0.6 MG/DL (ref 0.1–1)
BILIRUB UR QL STRIP: NEGATIVE
BUN SERPL-MCNC: 38 MG/DL (ref 8–23)
CALCIUM SERPL-MCNC: 9.4 MG/DL (ref 8.7–10.5)
CHLORIDE SERPL-SCNC: 111 MMOL/L (ref 95–110)
CLARITY UR: CLEAR
CO2 SERPL-SCNC: 21 MMOL/L (ref 23–29)
COLOR UR: YELLOW
CREAT SERPL-MCNC: 1.6 MG/DL (ref 0.5–1.4)
DIFFERENTIAL METHOD BLD: ABNORMAL
EOSINOPHIL # BLD AUTO: 0.1 K/UL (ref 0–0.5)
EOSINOPHIL NFR BLD: 2.2 % (ref 0–8)
ERYTHROCYTE [DISTWIDTH] IN BLOOD BY AUTOMATED COUNT: 12 % (ref 11.5–14.5)
EST. GFR  (NO RACE VARIABLE): 44 ML/MIN/1.73 M^2
GLUCOSE SERPL-MCNC: 101 MG/DL (ref 70–110)
GLUCOSE UR QL STRIP: NEGATIVE
HCT VFR BLD AUTO: 31.6 % (ref 40–54)
HGB BLD-MCNC: 10 G/DL (ref 14–18)
HGB UR QL STRIP: NEGATIVE
HYALINE CASTS #/AREA URNS LPF: 1 /LPF
IMM GRANULOCYTES # BLD AUTO: 0.01 K/UL (ref 0–0.04)
IMM GRANULOCYTES NFR BLD AUTO: 0.3 % (ref 0–0.5)
KETONES UR QL STRIP: NEGATIVE
LEUKOCYTE ESTERASE UR QL STRIP: ABNORMAL
LYMPHOCYTES # BLD AUTO: 0.8 K/UL (ref 1–4.8)
LYMPHOCYTES NFR BLD: 22.9 % (ref 18–48)
MAGNESIUM SERPL-MCNC: 2 MG/DL (ref 1.6–2.6)
MCH RBC QN AUTO: 29.7 PG (ref 27–31)
MCHC RBC AUTO-ENTMCNC: 31.6 G/DL (ref 32–36)
MCV RBC AUTO: 94 FL (ref 82–98)
MICROSCOPIC COMMENT: ABNORMAL
MONOCYTES # BLD AUTO: 0.5 K/UL (ref 0.3–1)
MONOCYTES NFR BLD: 14.7 % (ref 4–15)
NEUTROPHILS # BLD AUTO: 2.2 K/UL (ref 1.8–7.7)
NEUTROPHILS NFR BLD: 59.6 % (ref 38–73)
NITRITE UR QL STRIP: NEGATIVE
NRBC BLD-RTO: 0 /100 WBC
PH UR STRIP: 6 [PH] (ref 5–8)
PHOSPHATE SERPL-MCNC: 3.6 MG/DL (ref 2.7–4.5)
PLATELET # BLD AUTO: 199 K/UL (ref 150–450)
PMV BLD AUTO: 10.8 FL (ref 9.2–12.9)
POTASSIUM SERPL-SCNC: 4.8 MMOL/L (ref 3.5–5.1)
PROT SERPL-MCNC: 6.4 G/DL (ref 6–8.4)
PROT UR QL STRIP: ABNORMAL
RBC # BLD AUTO: 3.37 M/UL (ref 4.6–6.2)
RBC #/AREA URNS HPF: 2 /HPF (ref 0–4)
SODIUM SERPL-SCNC: 137 MMOL/L (ref 136–145)
SP GR UR STRIP: 1.02 (ref 1–1.03)
URN SPEC COLLECT METH UR: ABNORMAL
UROBILINOGEN UR STRIP-ACNC: NEGATIVE EU/DL
WBC # BLD AUTO: 3.67 K/UL (ref 3.9–12.7)
WBC #/AREA URNS HPF: 46 /HPF (ref 0–5)

## 2025-01-26 PROCEDURE — 83735 ASSAY OF MAGNESIUM: CPT

## 2025-01-26 PROCEDURE — 25000003 PHARM REV CODE 250

## 2025-01-26 PROCEDURE — 97166 OT EVAL MOD COMPLEX 45 MIN: CPT

## 2025-01-26 PROCEDURE — 25000003 PHARM REV CODE 250: Performed by: STUDENT IN AN ORGANIZED HEALTH CARE EDUCATION/TRAINING PROGRAM

## 2025-01-26 PROCEDURE — 63600175 PHARM REV CODE 636 W HCPCS: Performed by: STUDENT IN AN ORGANIZED HEALTH CARE EDUCATION/TRAINING PROGRAM

## 2025-01-26 PROCEDURE — 97161 PT EVAL LOW COMPLEX 20 MIN: CPT

## 2025-01-26 PROCEDURE — 63600175 PHARM REV CODE 636 W HCPCS: Performed by: INTERNAL MEDICINE

## 2025-01-26 PROCEDURE — 87086 URINE CULTURE/COLONY COUNT: CPT | Performed by: STUDENT IN AN ORGANIZED HEALTH CARE EDUCATION/TRAINING PROGRAM

## 2025-01-26 PROCEDURE — 81000 URINALYSIS NONAUTO W/SCOPE: CPT | Performed by: STUDENT IN AN ORGANIZED HEALTH CARE EDUCATION/TRAINING PROGRAM

## 2025-01-26 PROCEDURE — 11000001 HC ACUTE MED/SURG PRIVATE ROOM

## 2025-01-26 PROCEDURE — 63600175 PHARM REV CODE 636 W HCPCS

## 2025-01-26 PROCEDURE — 84100 ASSAY OF PHOSPHORUS: CPT

## 2025-01-26 PROCEDURE — 36415 COLL VENOUS BLD VENIPUNCTURE: CPT

## 2025-01-26 PROCEDURE — S4991 NICOTINE PATCH NONLEGEND: HCPCS

## 2025-01-26 PROCEDURE — 85025 COMPLETE CBC W/AUTO DIFF WBC: CPT

## 2025-01-26 PROCEDURE — 80053 COMPREHEN METABOLIC PANEL: CPT

## 2025-01-26 RX ORDER — HALOPERIDOL 5 MG/ML
0.5 INJECTION INTRAMUSCULAR EVERY 6 HOURS PRN
Status: DISCONTINUED | OUTPATIENT
Start: 2025-01-26 | End: 2025-01-26

## 2025-01-26 RX ORDER — ZIPRASIDONE MESYLATE 20 MG/ML
10 INJECTION, POWDER, LYOPHILIZED, FOR SOLUTION INTRAMUSCULAR EVERY 6 HOURS PRN
Status: DISCONTINUED | OUTPATIENT
Start: 2025-01-26 | End: 2025-01-28

## 2025-01-26 RX ORDER — OLANZAPINE 10 MG/2ML
5 INJECTION, POWDER, FOR SOLUTION INTRAMUSCULAR ONCE AS NEEDED
Status: COMPLETED | OUTPATIENT
Start: 2025-01-26 | End: 2025-01-26

## 2025-01-26 RX ORDER — ZIPRASIDONE HYDROCHLORIDE 20 MG/1
20 CAPSULE ORAL 2 TIMES DAILY PRN
Status: DISCONTINUED | OUTPATIENT
Start: 2025-01-26 | End: 2025-01-28

## 2025-01-26 RX ADMIN — ZIPRASIDONE HYDROCHLORIDE 20 MG: 20 CAPSULE ORAL at 01:01

## 2025-01-26 RX ADMIN — THERA TABS 1 TABLET: TAB at 08:01

## 2025-01-26 RX ADMIN — GABAPENTIN 300 MG: 300 CAPSULE ORAL at 08:01

## 2025-01-26 RX ADMIN — POLYETHYLENE GLYCOL 3350 17 G: 17 POWDER, FOR SOLUTION ORAL at 08:01

## 2025-01-26 RX ADMIN — THIAMINE HYDROCHLORIDE 400 MG: 100 INJECTION, SOLUTION INTRAMUSCULAR; INTRAVENOUS at 06:01

## 2025-01-26 RX ADMIN — CYANOCOBALAMIN TAB 1000 MCG 1000 MCG: 1000 TAB at 08:01

## 2025-01-26 RX ADMIN — HALOPERIDOL LACTATE 0.5 MG: 5 INJECTION, SOLUTION INTRAMUSCULAR at 10:01

## 2025-01-26 RX ADMIN — THIAMINE HYDROCHLORIDE 400 MG: 100 INJECTION, SOLUTION INTRAMUSCULAR; INTRAVENOUS at 01:01

## 2025-01-26 RX ADMIN — ENOXAPARIN SODIUM 30 MG: 30 INJECTION SUBCUTANEOUS at 04:01

## 2025-01-26 RX ADMIN — ZIPRASIDONE MESYLATE 10 MG: 20 INJECTION, POWDER, LYOPHILIZED, FOR SOLUTION INTRAMUSCULAR at 07:01

## 2025-01-26 RX ADMIN — OLANZAPINE 5 MG: 10 INJECTION, POWDER, FOR SOLUTION INTRAMUSCULAR at 10:01

## 2025-01-26 RX ADMIN — NICOTINE 1 PATCH: 21 PATCH, EXTENDED RELEASE TRANSDERMAL at 08:01

## 2025-01-26 RX ADMIN — FOLIC ACID 1 MG: 1 TABLET ORAL at 08:01

## 2025-01-26 NOTE — PLAN OF CARE
Problem: Physical Therapy  Goal: Physical Therapy Goal  Description: Goals to be met by: 25     Patient will increase functional independence with mobility by performin. Supine to sit with Modified [place]  2. Sit to supine with Modified [place]  3. Sit to stand transfer with Supervision/Mod I  4. Gait  x 100 feet with Supervision/ Mod I using LRAD.     Outcome: Progressing     PT Evaluation orders received and completed. Patient reports PLOF as mod I with SPC with ADLS and gait with patient using bike to travel. Patient is currently requiring SBA with sit to/from supine transition, moderate assist x 2 with sit to/from stand transition, and gait x 2 steps with HHA with moderate assist. This patient would benefit from [facility] skilled PT services while admitted in the hospital to improve bilateral LE MMT grossly, to improve functional independence with transfers and gait, and to improve balance and decrease fall risk.

## 2025-01-26 NOTE — PLAN OF CARE
Problem: Occupational Therapy  Goal: Occupational Therapy Goal  Description: Goals to be met by: 02/25/25     Patient will increase functional independence with ADLs by performing:    UE Dressing with Modified [place].  LE Dressing with Modified [place] and Assistive Devices as needed.  Grooming while standing at sink with Modified [place].  Toileting from toilet with Modified [place] for hygiene and clothing management.   Toilet transfer to toilet with Modified [place].  Upper extremity exercise program 3x10 reps per handout, with supervision.    Outcome: Progressing     Pt was agreeable to and participated in limited OT/PT evaluation.  Pt lives alone and reports that he was mod I with mobility and ADLS at [facility].  Pt completed his limited evaluation (declined walking and donning socks) and noted to require SBA-mod A x2 with func mobility and setup with ADLS (only grooming noted).  Pt noted with limited B knee extension and stood in flexed forward posture.  Goals established to assist pt with returning to [facility] regarding ADLs and func mobility.  Pt will benefit from skilled OT services in order to increase his level of safety and independence with ADLs and mobility.      Karime Oliver, OT  1/26/2025

## 2025-01-26 NOTE — PROGRESS NOTES
"[facility] Medicine Progress Note    Admitting Team: [facility] Hospitalist Team A  Attending Physician: Stephanie Adhikari MD  Resident: Dru  Intern: Louann     Date of Admit: 1/20/2025    Subjective/Interval History      Sleeping through most of day yesterday. No complaints this AM.        Objective     Physical Examination:  /74 (BP Location: Left arm, Patient Position: Lying)   Pulse 70   Temp 98.4 °F (36.9 °C) (Oral)   Resp 16   Ht 5' 8" (1.727 m)   Wt 57.6 kg (126 lb 15.8 oz)   SpO2 99%   BMI 19.31 kg/m²    Physical Exam  Vitals and nursing note reviewed.   Constitutional:       Appearance: Normal appearance.      Comments: Disoriented on exam   HENT:      Head: Normocephalic.      Right Ear: External ear normal.      Left Ear: External ear normal.      Nose: Nose normal.      Mouth/Throat:      Mouth: Mucous membranes are moist.   Eyes:      Extraocular Movements: Extraocular movements intact.      Pupils: Pupils are equal, round, and reactive to light.   Cardiovascular:      Rate and Rhythm: Normal rate and regular rhythm.      Pulses: Normal pulses.      Heart sounds: Normal heart sounds.   Pulmonary:      Effort: Pulmonary effort is normal.      Breath sounds: Normal breath sounds.   Abdominal:      General: Abdomen is flat.      Palpations: Abdomen is soft.   Musculoskeletal:         General: Normal range of motion.   Skin:     General: Skin is warm.      Capillary Refill: Capillary refill takes less than 2 seconds.   Neurological:      General: No focal deficit present.      Mental Status: Mental status is at baseline. He is disoriented.   Psychiatric:         Cognition and Memory: Cognition is impaired. Memory is impaired.       Intake/Output:    Intake/Output Summary (Last 24 hours) at 1/26/2025 0728  Last data filed at 1/26/2025 0617  Gross per 24 hour   Intake 1023 ml   Output 650 ml   Net 373 ml     Net IO Since Admission: 860.48 mL [01/26/25 0728]    Laboratory data: reviewed "     Microbiology data: reviewed   Blood cultures NG2D    EKG data: reviewed   Sinus Bradycardia     Radiology data: reviewed   US Liver with Doppler (xpd)   Final Result      No definite acute sonographic findings.      Additional details, as provided in the body of report.         Electronically signed by: Lorne Coleman   Date:    01/21/2025   Time:    12:52      CT Head Without Contrast   Final Result      1. No CT evidence of acute intracranial abnormality. Clinical correlation and further evaluation as warranted.   2. Generalized cerebral volume loss and findings suggestive of chronic microvascular ischemic change.   3. Paranasal sinus disease as above.         Electronically signed by: Paloma High MD   Date:    01/21/2025   Time:    01:22      X-Ray Chest AP Portable   Final Result      No acute process seen.         Electronically signed by: Lewis Samano MD   Date:    01/20/2025   Time:    10:58            Current Medications:     Infusions:         Scheduled:   cyanocobalamin  1,000 mcg Oral Daily    enoxparin  30 mg Subcutaneous Q24H (prophylaxis, 1700)    folic acid  1 mg Oral Daily    gabapentin  300 mg Oral BID    melatonin  3 mg Oral Nightly    multivitamin  1 tablet Oral Daily    nicotine  1 patch Transdermal Daily    polyethylene glycol  17 g Oral Daily    [START ON 1/28/2025] thiamine (B-1) injection  200 mg Intravenous Daily    thiamine (B-1) injection  400 mg Intravenous Q8H        PRN:    Current Facility-Administered Medications:     acetaminophen, 650 mg, Oral, Q4H PRN    dextrose 50%, 12.5 g, Intravenous, PRN    dextrose 50%, 25 g, Intravenous, PRN    glucagon (human recombinant), 1 mg, Intramuscular, PRN    glucose, 16 g, Oral, PRN    glucose, 24 g, Oral, PRN    naloxone, 0.02 mg, Intravenous, PRN    ondansetron, 4 mg, Intravenous, Q6H PRN    sodium chloride 0.9%, 10 mL, Intravenous, Q12H PRN      Assessment/Plan:     Solomon Terrazas is a 74 y.o. male with a PMHx of Tobacco Use disorder and  Alcohol Use disorder. The patient presented on 1/20/2025 for REDD and Presyncope now admitted for IV hydration and further workup.     Encephalopathy  Alcohol use disorder  - unclear baseline mental status, attempting to contact family  - wax/waning; consideration for wernicke's encephalopathy given reported alcohol use history vs delirium; appears improving  - CT head w/ atrophic changes; electrolytes stable  - s/p gabapentin taper  - increased sleep during day; is on delirium precautions, concern for hyperactive-->hypoactive delirium  - stopping CIWA   - Thiamine repletion IV     Presyncope vs Syncope   - Unclear story; patient's story does not reveal any true LOC  - Suspect vasovagal etiology potentially related to dehydration; however unclear  - May be due to alcohol intoxication as patient has history of significant alcohol use  - CT Head negative for acute abnormalities   - TTE and w/o valvulopathy or HF  - Continue to monitor on telemetry  - PT/OT eval     Stage 1 Acute Kidney Injury  Metabolic acidosis  - Creatinine of 1.7 on admission; appears baseline is 1.3  - Urine studies ordered to evaluate etiology   -likely 2/2 to pre-renal etiology  - received gentle IVF initially  - Holding nephrotoxic meds; Monitor with daily CMP  - 1/25 Creatinine bump to 1.7 from 1.4, appears hypovolemic; will give gentle fluids and encourage hydration  - bicarb persistently low, with infrequent mild hyperK and hyperchloremia; urine anion gap of 79 which may be c/w type 4 RTA. Consideration of heparin induced etiology during hospitalization. UA not collected, urine pH on admit only 6.0.   - c/s nephrology regarding c/f RTA     Tobacco Use Disorder  - patient on smoking cessation  -nicotine patches while inpatient.     Normocytic Anemia  Hypovitaminosis B12  - stable, ferritin elevated w/ low iron sat; consider mixed picture w/ nutritional deficiencies vs chronic inflammation  - repleting B12 IM, start PO  supplementation    Hyperkalemia; resolved  -mild at 5.2 w/o evidence of EKG changes  -monitoring w/ daily CMP    Healthcare maintenance   -primary care provider: None     Diet: Adult regular   VTE PPx: Heparin  Code Status: Full Code    Dispo: Pending clinical improvement vs placement  Estimated LOS: >72 hrs.     Joao Malone DO  [facility] Medicine/Emergency Medicine HO-V

## 2025-01-26 NOTE — PT/OT/SLP EVAL
Occupational Therapy   Evaluation    Name: Solomon Terrazas  MRN: [id number]  Admitting Diagnosis: Syncope and collapse  Recent Surgery: * No surgery found *      Recommendations:     Discharge Recommendations: Moderate Intensity Therapy  Discharge Equipment Recommendations:  to be determined by next level of care  Barriers to discharge:  Decreased caregiver support    Assessment:     Solomon Terrazas is a 74 y.o. male with a medical diagnosis of Syncope and collapse.  He presents with the following performance deficits affecting function: weakness, impaired endurance, impaired self care skills, impaired functional mobility, gait instability, impaired balance, impaired cognition, decreased coordination, decreased upper extremity function, decreased lower extremity function, decreased safety awareness, decreased ROM, impaired coordination, impaired muscle length.      Pt was agreeable to and participated in limited OT/PT evaluation.  Pt lives alone and reports that he was mod I with mobility and ADLS at [facility].  Pt completed his limited evaluation (declined walking and donning socks) and noted to require SBA-mod A x2 with func mobility and setup with ADLS (only grooming noted).  Pt noted with limited B knee extension and stood in flexed forward posture.  Goals established to assist pt with returning to [facility] regarding ADLs and func mobility.  Pt will benefit from skilled OT services in order to increase his level of safety and independence with ADLs and mobility.      Rehab Prognosis: Fair; patient would benefit from acute skilled OT services to address these deficits and reach maximum level of function.       Plan:     Patient to be seen 3 x/week to address the above listed problems via self-care/home management, therapeutic activities, therapeutic exercises  Plan of Care Expires: 02/25/25  Plan of Care Reviewed with: patient    Subjective     Chief Complaint: none given - wanted to return to bed  Patient/Family  Comments/goals: none given    Occupational Profile:  Living Environment: pt lives alone in a 4 bedroom home with no TERE to enter - T/S combo for bathing  Previous level of function: mod I with mobility using a cane - mod I for ADLS - rides bike everywhere  Equipment Used at Home: cane, straight  Assistance upon Discharge: none noted    Pain/Comfort:  Pain Rating 1: 0/10  Pain Rating Post-Intervention 1: 0/10    Patients cultural, spiritual, Jewish conflicts given the current situation: no    Objective:     Communicated with: nurse prior to session.  Patient found HOB elevated with telemetry, PureWick, bed alarm upon OT entry to room.    General Precautions: Standard, fall (appears to be [place])  Orthopedic Precautions: N/A  Braces: N/A  Respiratory Status: Room air    Occupational Performance:    Bed Mobility:    Patient completed Scooting/Bridging with stand by assistance  Patient completed Supine to Sit with stand by assistance  Patient completed Sit to Supine with stand by assistance    Functional Mobility/Transfers:  Patient completed Sit <> Stand Transfer with mod A x2  with  hand held assist (pt declined RW)   Functional Mobility: pt only able to take ~2 side steps to his right with HHA (declined RW) with mod A x2 - pt with hips and knees flexed when standing - unable to fully extend knees due to contractures    Activities of Daily Living:  Grooming: set up    Lower Body Dressing: declined donning/doffing socks    Cognitive/Visual Perceptual:  Cognitive/Psychosocial Skills:     -       Oriented to: Person, Place, and Situation   -       Follows Commands/attention:Easily distracted and unsure if pt is [place] as questions needed to be repeated multiple times  -       Communication: clear/fluent  -       Memory: impaired  -       Safety awareness/insight to disability: impaired   -       Mood/Affect/Coping skills/emotional control: disinterested in participating in therapy    Physical Exam:  Balance: -        sitting EOB = good;  standing = mod A x2 due to poor posture  Postural examination/scapula alignment:    -       Rounded shoulders  -       Forward head  -       Posterior pelvic tilt  Skin integrity: Dry  Edema:  None noted  Upper Extremity Range of Motion: limited B shoulder flexion, but WFL for ADLS   Upper Extremity Strength:  BUEs = WFL for ADLS   Strength:  BUEs = WFL for ADLS    AMPAC 6 Click ADL:  AMPAC Total Score: 16    Treatment & Education:  Pt completed ADLs and func mobility activities for tx session as noted above  Pt educated on role of OT and POC      Patient left HOB elevated with all lines intact, call button in reach, and bed alarm on    GOALS:   Multidisciplinary Problems       Occupational Therapy Goals          Problem: Occupational Therapy    Goal Priority Disciplines Outcome Interventions   Occupational Therapy Goal     OT, PT/OT Progressing    Description: Goals to be met by: 02/25/25     Patient will increase functional independence with ADLs by performing:    UE Dressing with Modified [place].  LE Dressing with Modified [place] and Assistive Devices as needed.  Grooming while standing at sink with Modified [place].  Toileting from toilet with Modified [place] for hygiene and clothing management.   Toilet transfer to toilet with Modified [place].  Upper extremity exercise program 3x10 reps per handout, with supervision.                         DME Justifications:  TBD at next level of care    History:     History reviewed. No pertinent past medical history.    History reviewed. No pertinent surgical history.    Time Tracking:     OT Date of Treatment: 01/26/25  OT Start Time: 0827  OT Stop Time: 0837  OT Total Time (min): 10 min    Billable Minutes:Evaluation 10 - cotx with PT    1/26/2025

## 2025-01-26 NOTE — CONSULTS
[facility] Nephrology Consult Note    Reason for Consult:     REDD evaluation and management.    Assessment and Plan:     Solomon Terrazas is a 74 y.o. male with a PMHx of Tobacco Use disorder and Alcohol Use disorder. The patient presented on 1/20/2025 for REDD and Presyncope now admitted for IV hydration and further workup. Nephrology consulted fro concerns of RTA    # Non oliguric redd -pre renal   Hyperchloremic metabolic acidosis  RTA type 4 ?  - Pt w/ a baseline Cr ~1.0-1.2, rise to 1.7 yesterday --> 1.6 today  - BUN/Cr ~ on admission  - UA showing PH 6.0, LE 1+, otherwise NA   - [place] 93, UCl 100, which shows no volume depletion  - Pt's REDD is mostly 2/2 pre renal ,H/O decreased PO intake  improved on IVF, initial FeNa was <1 %   - There is no acute indication for RRT at this time  -- Pt is currently euvolemic  - Recommend obtaining    - Daily Renal Function Panel  - Avoid Hypotension.  - Renally dose all meds  - pls get Renal US for evidence for obstruction or hydronephrosis, stones, nephrocalcinosis  - Please avoid nephrotoxins, including NSAIDs, aminoglycosides, IV contrast (unless absolutely necessary), gadolinium, fleets and other phosphorous-based laxatives. Caution with antibiotic  -As hyperkalemia is not persistent, low concerns for RTA as of now ,   -HyperK  could be due to redd which is improving now  -encourage PO intake    # Nutrition:  - Recommend starting pt on a renal diet    Subjective:      History of Present Illness:  Solomon Terrazas is a 74 y.o. male with a PMHx of Tobacco Use disorder and Alcohol Use disorder. The patient presented on 1/20/2025 for REDD and Presyncope now admitted for IV hydration and further workup. Nephrology consulted fro concerns of RTA      Past Medical History:  History reviewed. No pertinent past medical history.    Past Surgical History:  History reviewed. No pertinent surgical history.    Allergies:  Review of patient's allergies indicates:  No Known  Allergies    Medications:   In-Hospital Scheduled Medications:   cyanocobalamin  1,000 mcg Oral Daily    enoxparin  30 mg Subcutaneous Q24H (prophylaxis, 1700)    folic acid  1 mg Oral Daily    gabapentin  300 mg Oral BID    melatonin  3 mg Oral Nightly    multivitamin  1 tablet Oral Daily    nicotine  1 patch Transdermal Daily    polyethylene glycol  17 g Oral Daily    [START ON 1/28/2025] thiamine (B-1) injection  200 mg Intravenous Daily    thiamine (B-1) injection  400 mg Intravenous Q8H      In-Hospital PRN Medications:    Current Facility-Administered Medications:     acetaminophen, 650 mg, Oral, Q4H PRN    dextrose 50%, 12.5 g, Intravenous, PRN    dextrose 50%, 25 g, Intravenous, PRN    glucagon (human recombinant), 1 mg, Intramuscular, PRN    glucose, 16 g, Oral, PRN    glucose, 24 g, Oral, PRN    naloxone, 0.02 mg, Intravenous, PRN    ondansetron, 4 mg, Intravenous, Q6H PRN    sodium chloride 0.9%, 10 mL, Intravenous, Q12H PRN    ziprasidone, 20 mg, Oral, BID PRN    ziprasidone, 10 mg, Intramuscular, Q6H PRN   In-Hospital IV Infusion Medications:     Home Medications:  Prior to Admission medications    Medication Sig Start Date End Date Taking? Authorizing Provider   methocarbamoL (ROBAXIN) 500 MG Tab Take 1 tablet (500 mg total) by mouth 4 (four) times daily as needed (pain). 9/7/21 9/17/21  Shanon Cheema PA-C   oxyCODONE-acetaminophen (PERCOCET) 5-325 mg per tablet Take 1 tablet by mouth every 12 (twelve) hours as needed for Pain. 9/7/21 9/10/21  Shanon Cheema PA-C       Family History:  No family history on file.    Social History:  Social History     Tobacco Use    Smoking status: Every Day     Current packs/day: 0.50     Average packs/day: 0.5 packs/day for 66.1 years (33.0 ttl pk-yrs)     Types: Cigarettes     Start date: 1959    Smokeless tobacco: Never    Tobacco comments:     smokes 3 or 4 cigarettes per day     Substance Use Topics    Alcohol use: Yes     Alcohol/week: 2.0 - 4.0 standard  drinks of alcohol     Types: 2 - 4 Cans of beer per week     Comment: Pt reports he drinks gin everyday    Drug use: Not Currently       Review of Systems:  Pertinent items are noted in HPI. All other systems are reviewed and are negative.       Objective:   Last 24 Hour Vital Signs:  BP  Min: 107/76  Max: 158/74  Temp  Av.4 °F (36.9 °C)  Min: 97.9 °F (36.6 °C)  Max: 99 °F (37.2 °C)  Pulse  Av.3  Min: 65  Max: 84  Resp  Av  Min: 16  Max: 20  SpO2  Av.5 %  Min: 92 %  Max: 100 %  Weight  Av.6 kg (126 lb 15.8 oz)  Min: 57.6 kg (126 lb 15.8 oz)  Max: 57.6 kg (126 lb 15.8 oz)  I/O last 3 completed shifts:  In: 1098 [P.O.:948; I.V.:150]  Out: 1100 [Urine:1100]    Physical Examination:  Vitals reviewed.   Constitutional:       Appearance: Normal appearance.   HENT:      Head: Normocephalic and atraumatic.   Cardiovascular:      Rate and Rhythm: Normal rate and regular rhythm.      Heart sounds: Normal heart sounds.   Pulmonary:      Effort: Pulmonary effort is normal.      Breath sounds: Normal breath sounds.   Abdominal:      General: Bowel sounds are normal.      Palpations: Abdomen is soft.   Musculoskeletal:         General: No swelling.   Skin:     General: Skin is warm and dry.   Neurological:      General: No focal deficit present.      Mental Status: He is alert and oriented to person, place, and time.      Motor: Weakness present.   Psychiatric:         Mood and Affect: Mood normal.         Behavior: Behavior normal.         Thought Content: Thought content normal.   Laboratory:  Most Recent Data:  CBC:   Lab Results   Component Value Date    WBC 3.67 (L) 2025    HGB 10.0 (L) 2025     2025    MCV 94 2025    RDW 12.0 2025     BMP:   Lab Results   Component Value Date     2025    K 4.8 2025     (H) 2025    CO2 21 (L) 2025     2025    BUN 38 (H) 2025    CREATININE 1.6 (H) 2025    CALCIUM 9.4  01/26/2025    MG 2.0 01/26/2025    PHOS 3.6 01/26/2025     LFTs:   Lab Results   Component Value Date    PROT 6.4 01/26/2025    ALBUMIN 2.9 (L) 01/26/2025    AST 28 01/26/2025    ALKPHOS 61 01/26/2025    ALT 12 01/26/2025     Anemia:   Lab Results   Component Value Date    IRON 44 (L) 01/21/2025    TIBC 290 01/21/2025    FERRITIN 361 (H) 01/21/2025    AICXPYJA45 245 01/21/2025    FOLATE >40.0 (H) 01/21/2025     Urinalysis:   Lab Results   Component Value Date    COLORU Yellow 01/20/2025    SPECGRAV 1.020 01/20/2025    NITRITE Negative 01/20/2025    KETONESU Negative 01/20/2025    UROBILINOGEN Negative 01/20/2025    WBCUA 4 01/20/2025       Trended Lab Data:  Recent Labs   Lab 01/24/25  0633 01/24/25  0748 01/25/25  0755 01/25/25  0756 01/26/25  0816   WBC  --  4.79  --  5.29 3.67*   PLT  --  201  --  196 199   MCV  --  93  --  96 94   RDW  --  11.9  --  12.1 12.0     --  139  --  137   K 4.9  --  5.2*  --  4.8   *  --  111*  --  111*   CO2 13*  --  17*  --  21*   BUN 25*  --  30*  --  38*   CREATININE 1.4  --  1.7*  --  1.6*   GLU 95  --  93  --  101   CALCIUM 9.8  --  10.3  --  9.4   PROT 6.8  --  7.4  --  6.4   ALBUMIN 3.4*  --  3.4*  --  2.9*   AST 23  --  38  --  28   ALKPHOS 72  --  69  --  61   ALT 12  --  17  --  12       Other Relevant Results:  Radiology:    Imaging has been reviewed personally.             Thank you for allowing us to participate in the care of this patient. Please contact me if you have any questions regarding this consult.    Ana Alaniz MD  [facility], -[facility]

## 2025-01-26 NOTE — PT/OT/SLP EVAL
Physical Therapy Evaluation    Patient Name:  Solomon Terrazas   MRN:  [id number]    Recommendations:     Discharge Recommendations: Moderate Intensity Therapy   Discharge Equipment Recommendations: other (see comments) (TBD at next level of care)   Barriers to discharge: Decreased caregiver support and decreased functional mobility, requiring increased assistance    Assessment:     Solomon Terrazas is a 74 y.o. male admitted with a medical diagnosis of Syncope and collapse.  He presents with the following impairments/functional limitations: weakness, impaired endurance, impaired self care skills, impaired functional mobility, gait instability, impaired balance, decreased lower extremity function, decreased safety awareness, decreased ROM, impaired joint extensibility, impaired muscle length.     PT Evaluation orders received and completed. Patient reports PLOF as mod I with SPC with ADLS and gait with patient using bike to travel. Patient is currently requiring SBA with sit to/from supine transition, moderate assist x 2 with sit to/from stand transition, and gait x 2 steps with HHA with moderate assist. This patient would benefit from [facility] skilled PT services while admitted in the hospital to improve bilateral LE MMT grossly, to improve functional independence with transfers and gait, and to improve balance and decrease fall risk.     Rehab Prognosis: Good; patient would benefit from acute skilled PT services to address these deficits and reach maximum level of function.    Recent Surgery: * No surgery found *      Plan:     During this hospitalization, patient to be seen 4 x/week to address the identified rehab impairments via gait training, therapeutic activities, therapeutic exercises, neuromuscular re-education and progress toward the following goals:    Plan of Care Expires:  02/26/25    Subjective     Chief Complaint: I want to get some rest.   Patient/Family Comments/goals: I just want to be able to rest right now.    Pain/Comfort:  Pain Rating 1: 0/10  Pain Rating Post-Intervention 1: 0/10    Patients cultural, spiritual, Adventist conflicts given the current situation: no    Living Environment: patient lives alone at home in a H  Prior to admission, patients level of function was mod I with use of SPC with ADLS and gait, riding bike for transportation.  Equipment used at home: cane, straight.  DME owned (not currently used): none.  Upon discharge, patient will have assistance from facility staff.    Objective:     Communicated with NOBLE Rivera prior to session.  Patient found HOB elevated with PureWick, peripheral IV, telemetry, bed alarm  upon PT entry to room.    General Precautions: Standard, fall, other (see comments) ([facility])  Orthopedic Precautions:N/A   Braces: N/A  Respiratory Status: Room air    Exams:  Cognitive Exam:  Patient is oriented to Person, Place, and Time  RLE ROM: WFL except patient unable to achieve full knee extension A/PROM  RLE Strength: WFL  LLE ROM: WFL except patient unable to achieve full knee extension A/PROM  LLE Strength: WFL    Functional Mobility:  Bed Mobility:     Supine to Sit: stand by assistance  Sit to Supine: stand by assistance  Transfers:     Sit to Stand:  moderate assistance and of 2 persons with hand-held assist  Gait: patient was able to take 2 steps with HHA x 2 with moderate assist x 2 with patient demonstrating forward flexion of trunk, crouched position with patient unable to achieve full knee extension with stance phase of gait, wide MILES, hesitant to take any additional steps with overall decreased step length  Balance: good seated balance, fair standing balance    AM-PAC 6 CLICK MOBILITY  Total Score:15     Treatment & Education:  - PT reviewed PT role and POC.   - PT reviewed postural awareness to ensure upright trunk and midline position.   - PT reviewed proper hand placement and sequencing with transfers and gait.   - PT reviewed seated HEP for patient to complete  focusing on improving patients ability to achieve improved knee extension with patient demonstrating understanding.     Patient left HOB elevated with all lines intact, call button in reach, and bed alarm on.    GOALS:   Multidisciplinary Problems       Physical Therapy Goals          Problem: Physical Therapy    Goal Priority Disciplines Outcome Interventions   Physical Therapy Goal     PT, PT/OT Progressing    Description: Goals to be met by: 25     Patient will increase functional independence with mobility by performin. Supine to sit with Modified [place]  2. Sit to supine with Modified [place]  3. Sit to stand transfer with Supervision/Mod I  4. Gait  x 100 feet with Supervision/ Mod I using LRAD.                          DME Justifications:  TBD pending progress at at next level of care    History:     History reviewed. No pertinent past medical history.    History reviewed. No pertinent surgical history.    Time Tracking:     PT Received On: 25  PT Start Time: 827     PT Stop Time: 837  PT Total Time (min): 10 min     Billable Minutes: Evaluation 10 minutes - Co-eval with OT       2025

## 2025-01-27 ENCOUNTER — CLINICAL SUPPORT (OUTPATIENT)
Dept: SMOKING CESSATION | Facility: CLINIC | Age: 75
End: 2025-01-27

## 2025-01-27 DIAGNOSIS — F17.210 CIGARETTE SMOKER: Primary | ICD-10-CM

## 2025-01-27 PROBLEM — R41.0 ACUTE DELIRIUM: Status: ACTIVE | Noted: 2025-01-27

## 2025-01-27 LAB
ALBUMIN SERPL BCP-MCNC: 3.4 G/DL (ref 3.5–5.2)
ALP SERPL-CCNC: 68 U/L (ref 40–150)
ALT SERPL W/O P-5'-P-CCNC: 18 U/L (ref 10–44)
ANION GAP SERPL CALC-SCNC: 9 MMOL/L (ref 8–16)
AST SERPL-CCNC: 45 U/L (ref 10–40)
BACTERIA UR CULT: NORMAL
BACTERIA UR CULT: NORMAL
BASOPHILS # BLD AUTO: 0.02 K/UL (ref 0–0.2)
BASOPHILS NFR BLD: 0.4 % (ref 0–1.9)
BILIRUB SERPL-MCNC: 0.7 MG/DL (ref 0.1–1)
BUN SERPL-MCNC: 34 MG/DL (ref 8–23)
CALCIUM SERPL-MCNC: 10 MG/DL (ref 8.7–10.5)
CHLORIDE SERPL-SCNC: 113 MMOL/L (ref 95–110)
CO2 SERPL-SCNC: 17 MMOL/L (ref 23–29)
CREAT SERPL-MCNC: 1.4 MG/DL (ref 0.5–1.4)
DIFFERENTIAL METHOD BLD: ABNORMAL
EOSINOPHIL # BLD AUTO: 0 K/UL (ref 0–0.5)
EOSINOPHIL NFR BLD: 0.9 % (ref 0–8)
ERYTHROCYTE [DISTWIDTH] IN BLOOD BY AUTOMATED COUNT: 12.1 % (ref 11.5–14.5)
EST. GFR  (NO RACE VARIABLE): 52 ML/MIN/1.73 M^2
GIANT PLATELETS BLD QL SMEAR: PRESENT
GLUCOSE SERPL-MCNC: 105 MG/DL (ref 70–110)
HCT VFR BLD AUTO: 33.8 % (ref 40–54)
HGB BLD-MCNC: 10.6 G/DL (ref 14–18)
IMM GRANULOCYTES # BLD AUTO: 0.04 K/UL (ref 0–0.04)
IMM GRANULOCYTES NFR BLD AUTO: 0.9 % (ref 0–0.5)
LYMPHOCYTES # BLD AUTO: 0.9 K/UL (ref 1–4.8)
LYMPHOCYTES NFR BLD: 19.1 % (ref 18–48)
MAGNESIUM SERPL-MCNC: 2 MG/DL (ref 1.6–2.6)
MCH RBC QN AUTO: 29.9 PG (ref 27–31)
MCHC RBC AUTO-ENTMCNC: 31.4 G/DL (ref 32–36)
MCV RBC AUTO: 95 FL (ref 82–98)
MONOCYTES # BLD AUTO: 0.5 K/UL (ref 0.3–1)
MONOCYTES NFR BLD: 11.6 % (ref 4–15)
NEUTROPHILS # BLD AUTO: 3.1 K/UL (ref 1.8–7.7)
NEUTROPHILS NFR BLD: 67.1 % (ref 38–73)
NRBC BLD-RTO: 0 /100 WBC
PHOSPHATE SERPL-MCNC: 3.2 MG/DL (ref 2.7–4.5)
PLATELET # BLD AUTO: 199 K/UL (ref 150–450)
PLATELET BLD QL SMEAR: ABNORMAL
PMV BLD AUTO: 11.4 FL (ref 9.2–12.9)
POTASSIUM SERPL-SCNC: 5.3 MMOL/L (ref 3.5–5.1)
PROT SERPL-MCNC: 7.3 G/DL (ref 6–8.4)
RBC # BLD AUTO: 3.55 M/UL (ref 4.6–6.2)
SODIUM SERPL-SCNC: 139 MMOL/L (ref 136–145)
WBC # BLD AUTO: 4.65 K/UL (ref 3.9–12.7)

## 2025-01-27 PROCEDURE — 85025 COMPLETE CBC W/AUTO DIFF WBC: CPT

## 2025-01-27 PROCEDURE — 99406 BEHAV CHNG SMOKING 3-10 MIN: CPT | Mod: ,,,

## 2025-01-27 PROCEDURE — 11000001 HC ACUTE MED/SURG PRIVATE ROOM

## 2025-01-27 PROCEDURE — 25000003 PHARM REV CODE 250: Performed by: STUDENT IN AN ORGANIZED HEALTH CARE EDUCATION/TRAINING PROGRAM

## 2025-01-27 PROCEDURE — 80053 COMPREHEN METABOLIC PANEL: CPT

## 2025-01-27 PROCEDURE — 25000003 PHARM REV CODE 250: Performed by: PSYCHIATRY & NEUROLOGY

## 2025-01-27 PROCEDURE — 97112 NEUROMUSCULAR REEDUCATION: CPT

## 2025-01-27 PROCEDURE — 83735 ASSAY OF MAGNESIUM: CPT

## 2025-01-27 PROCEDURE — 63600175 PHARM REV CODE 636 W HCPCS: Performed by: STUDENT IN AN ORGANIZED HEALTH CARE EDUCATION/TRAINING PROGRAM

## 2025-01-27 PROCEDURE — 63600175 PHARM REV CODE 636 W HCPCS: Performed by: INTERNAL MEDICINE

## 2025-01-27 PROCEDURE — S4991 NICOTINE PATCH NONLEGEND: HCPCS

## 2025-01-27 PROCEDURE — 97530 THERAPEUTIC ACTIVITIES: CPT

## 2025-01-27 PROCEDURE — 90833 PSYTX W PT W E/M 30 MIN: CPT | Mod: ,,, | Performed by: PSYCHIATRY & NEUROLOGY

## 2025-01-27 PROCEDURE — 36415 COLL VENOUS BLD VENIPUNCTURE: CPT

## 2025-01-27 PROCEDURE — 25000003 PHARM REV CODE 250

## 2025-01-27 PROCEDURE — 84100 ASSAY OF PHOSPHORUS: CPT

## 2025-01-27 PROCEDURE — 99223 1ST HOSP IP/OBS HIGH 75: CPT | Mod: ,,, | Performed by: PSYCHIATRY & NEUROLOGY

## 2025-01-27 RX ORDER — OLANZAPINE 10 MG/2ML
5 INJECTION, POWDER, FOR SOLUTION INTRAMUSCULAR ONCE AS NEEDED
Status: COMPLETED | OUTPATIENT
Start: 2025-01-27 | End: 2025-01-27

## 2025-01-27 RX ORDER — MIRTAZAPINE 7.5 MG/1
7.5 TABLET, FILM COATED ORAL NIGHTLY
Status: DISCONTINUED | OUTPATIENT
Start: 2025-01-27 | End: 2025-01-31 | Stop reason: HOSPADM

## 2025-01-27 RX ORDER — HALOPERIDOL 5 MG/ML
5 INJECTION INTRAMUSCULAR ONCE
Status: COMPLETED | OUTPATIENT
Start: 2025-01-27 | End: 2025-01-27

## 2025-01-27 RX ORDER — ENOXAPARIN SODIUM 100 MG/ML
40 INJECTION SUBCUTANEOUS EVERY 24 HOURS
Status: DISCONTINUED | OUTPATIENT
Start: 2025-01-27 | End: 2025-01-31 | Stop reason: HOSPADM

## 2025-01-27 RX ORDER — CLONIDINE 0.1 MG/24H
1 PATCH, EXTENDED RELEASE TRANSDERMAL
Status: DISCONTINUED | OUTPATIENT
Start: 2025-01-27 | End: 2025-01-31 | Stop reason: HOSPADM

## 2025-01-27 RX ADMIN — CLONIDINE 1 PATCH: 0.1 PATCH TRANSDERMAL at 12:01

## 2025-01-27 RX ADMIN — ZIPRASIDONE HYDROCHLORIDE 20 MG: 20 CAPSULE ORAL at 01:01

## 2025-01-27 RX ADMIN — THERA TABS 1 TABLET: TAB at 08:01

## 2025-01-27 RX ADMIN — OLANZAPINE 5 MG: 10 INJECTION, POWDER, FOR SOLUTION INTRAMUSCULAR at 09:01

## 2025-01-27 RX ADMIN — CYANOCOBALAMIN TAB 1000 MCG 1000 MCG: 1000 TAB at 08:01

## 2025-01-27 RX ADMIN — MIRTAZAPINE 7.5 MG: 7.5 TABLET, FILM COATED ORAL at 08:01

## 2025-01-27 RX ADMIN — FOLIC ACID 1 MG: 1 TABLET ORAL at 08:01

## 2025-01-27 RX ADMIN — ENOXAPARIN SODIUM 40 MG: 40 INJECTION SUBCUTANEOUS at 05:01

## 2025-01-27 RX ADMIN — GABAPENTIN 300 MG: 300 CAPSULE ORAL at 08:01

## 2025-01-27 RX ADMIN — HALOPERIDOL LACTATE 5 MG: 5 INJECTION, SOLUTION INTRAMUSCULAR at 02:01

## 2025-01-27 RX ADMIN — POLYETHYLENE GLYCOL 3350 17 G: 17 POWDER, FOR SOLUTION ORAL at 08:01

## 2025-01-27 RX ADMIN — Medication 3 MG: at 08:01

## 2025-01-27 RX ADMIN — NICOTINE 1 PATCH: 21 PATCH, EXTENDED RELEASE TRANSDERMAL at 08:01

## 2025-01-27 NOTE — CONSULTS
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 QTc prolongation with repeated use of neuroleptic PRNs.     - Please have CM/SW assist patient with outpatient mental health / addiction f/u for s/p discharge from this facility.      - Thank you for this consult ; will continue to follow patient while at [facility]         Total time spent with patient and/or managing/coordinating patient's care today (excluding the time spent on psychotherapy): 78 minutes   Time spent on psychotherapy today (as noted above): 21 minutes   Total time for encounter today including psychotherapy: 99 minutes      More than 50% of the time was spent counseling/coordinating care.     Consulting clinician was informed of the encounter and consult note.       STAFF:  Reyes Goetz MD  [facility]   1/27/2025  "

## 2025-01-27 NOTE — NURSING
The patient removed the IV from his right arm and is refusing to have it replaced with another. I will inform the medicine team.

## 2025-01-27 NOTE — PT/OT/SLP PROGRESS
Physical Therapy Treatment    Patient Name:  Solomon Terrazas   MRN:  [id number]    Recommendations:     Discharge Recommendations: Moderate Intensity Therapy  Discharge Equipment Recommendations: to be determined by next level of care  Barriers to discharge: Decreased caregiver support and and decreased independence with functional mobility and requires increased assist    Assessment:     Solomon Terrazas is a 74 y.o. male admitted with a medical diagnosis of Syncope and collapse.  He presents with the following impairments/functional limitations: weakness, impaired endurance, impaired self care skills, impaired functional mobility, gait instability, impaired balance, impaired cognition, decreased lower extremity function, decreased safety awareness, impaired skin, impaired cardiopulmonary response to activity     Patient seen for physical therapy session on this date.  Patient oriented to self only, does not follow simple motor commands more than 25%.  Patient with B UE restraints today, but is not combative during therapy session.  Patient performs supine to sit with mod assist for encouragement, then performs sit to stand x 1 trial with mod assist, max posterior lean, sits quickly and does not agree to ambulation.  Patient returns back to supine with min/mod assist.  Patient moving B LE while in bed, however does not comprehend motor commands to walk.  Patient returns to supine, restraints reapplied.  Patient soiled with urine, nursing aware.  Continue to recommend moderate intensity therapy to address functional limitations.  Inpatient physical therapy will continue to follow..    Rehab Prognosis: Good; patient would benefit from acute skilled PT services to address these deficits and reach maximum level of function.    Recent Surgery: * No surgery found *      Plan:     During this hospitalization, patient to be seen 4 x/week to address the identified rehab impairments via gait training, therapeutic activities,  therapeutic exercises, neuromuscular re-education and progress toward the following goals:    Plan of Care Expires:  02/26/25    Subjective     Chief Complaint: does not verbalize complaints  Patient/Family Comments/goals: to return to PLOF  Pain/Comfort:  Pain Rating 1: 0/10 (Does not verbalize pain)  Pain Rating Post-Intervention 1: 0/10      Objective:     Communicated with nurse Carmelo prior to session.  Patient found  supine in B UE restraints  with PureWick, telemetry, bed alarm upon PT entry to room.     General Precautions: Standard, fall, hearing impaired  Orthopedic Precautions: N/A  Braces: N/A  Respiratory Status: Room air     Functional Mobility:  Bed Mobility:     Supine to Sit: moderate assistance  Sit to Supine: minimum assistance  Transfers:     Sit to Stand:  moderate assistance with rolling walker, max posterior lean, sits quickly and does not agree to ambulation  Balance: Seated:  CGA at EOB sitting   Standing: requires mod assist, max unsteady with max posterior lean      AM-PAC 6 CLICK MOBILITY  Turning over in bed (including adjusting bedclothes, sheets and blankets)?: 2  Sitting down on and standing up from a chair with arms (e.g., wheelchair, bedside commode, etc.): 2  Moving from lying on back to sitting on the side of the bed?: 2  Moving to and from a bed to a chair (including a wheelchair)?: 1  Need to walk in hospital room?: 1  Climbing 3-5 steps with a railing?: 1  Basic Mobility Total Score: 9       Treatment & Education:  Patient seen for physical therapy session on this date.  Patient oriented to self only, does not follow simple motor commands more than 25%.  Patient with B UE restraints today, but is not combative during therapy session.  Patient performs supine to sit with mod assist for encouragement, then performs sit to stand x 1 trial with mod assist, max posterior lean, sits quickly and does not agree to ambulation.  Patient returns back to supine with min/mod assist.   Patient moving B LE while in bed, however does not comprehend motor commands to walk.  Patient returns to supine, restraints reapplied.  Patient soiled with urine, nursing aware.  Continue to recommend moderate intensity therapy to address functional limitations.  Inpatient physical therapy will continue to follow.    Patient left supine with all lines intact, call button in reach, bed alarm on, restraints reapplied at end of session, and nurse notified..    GOALS:   Multidisciplinary Problems       Physical Therapy Goals          Problem: Physical Therapy    Goal Priority Disciplines Outcome Interventions   Physical Therapy Goal     PT, PT/OT Progressing    Description: Goals to be met by: 25     Patient will increase functional independence with mobility by performin. Supine to sit with Modified [place]  2. Sit to supine with Modified [place]  3. Sit to stand transfer with Supervision/Mod I  4. Gait  x 100 feet with Supervision/ Mod I using LRAD.                          DME Justifications:  No DME recommended requiring DME justifications    Time Tracking:     PT Received On: 25  PT Start Time: 1053     PT Stop Time: 1110  PT Total Time (min): 17 min     Billable Minutes: Therapeutic Activity 8 and Neuromuscular Re-education 9    Treatment Type: Treatment  PT/PTA: PT     Number of PTA visits since last PT visit: 0     2025

## 2025-01-27 NOTE — PLAN OF CARE
1900 Pt appeared to be in no distress. Reported no pain. But pt very restless, agitated combative and having delusions of being a [place].  Attempting to punch and kick staff.   1910 code marguerite called to get pt back in bed.   1917 gave pt 10 mg Geodon IM    2200 pt again very restless, agitated and attempting to hit, and kick staff as they clean him up.   Another code white was called when pt was kicking and attempting to exit bed.       2234 olanzapine 5 mg ordered and giving IM    0145 pt again agitated, having delusion of being near a stair case, wanting to  put on his pants to leave.  Attempting to kick and punch staff.  Called med term.  Med team ordered another 5 mg of olanzapine but since med didn't hold pt very long, clemencia  se called for something different.    Med team ordered one 5 mg haldol dose.        Bed in lowest position, wheels locked, non skid socks, ID band worn, personal items and call bell with in reach, bed alarm set.

## 2025-01-27 NOTE — PROGRESS NOTES
Pharmacist Renal Dose Adjustment Note    Solomon Terrazas is a 74 y.o. male being treated with the medication enoxaparin    Patient Data:    Vital Signs (Most Recent):  Temp: 99.2 °F (37.3 °C) (01/27/25 0802)  Pulse: 95 (01/27/25 0802)  Resp: 18 (01/27/25 0802)  BP: (!) 165/84 (01/27/25 0802)  SpO2: 95 % (01/27/25 0802) Vital Signs (72h Range):  Temp:  [97.9 °F (36.6 °C)-101.3 °F (38.5 °C)]   Pulse:  []   Resp:  [16-22]   BP: (107-170)/(61-98)   SpO2:  [92 %-100 %]      Recent Labs   Lab 01/25/25  0755 01/26/25  0816 01/27/25  0510   CREATININE 1.7* 1.6* 1.4     Serum creatinine: 1.4 mg/dL 01/27/25 0510  Estimated creatinine clearance: 36.6 mL/min    Medication:enoxaparin dose: 30mg frequency q24h will be changed to medication:enoxaparin dose:40mg frequency:q24h    Pharmacist's Name: Ruy Corrales  Pharmacist's Extension: [phone]

## 2025-01-27 NOTE — MEDICAL/APP STUDENT
U Nephrology Progress Note    Assessment/Plan:     Solomon Terrazas is a 74 y.o. male with h/o tobacco use disorder and alcohol use disorder who presented to [facility] on 2025 for dizziness with near syncope. Nephrology was consulted due to concern for possible RTA. Patient's recent laboratory results prior to admission indicate that his current hyperchloremic metabolic acidosis is not his baseline kidney function. His current condition is most likely due to an REDD rather than an RTA.     #Non-oliguric REDD - pre-renal; hyperchloremic metabolic acidosis  -Baseline Cr ~1.0-1.2; Cr was 1.7 on 25; Cr of 1.4 today  -Hyperkalemia at 5.3  -Hyperchloremia at 113  -Bicarb decreased at 17  -BUN 34; BUN:Cr ratio 24   -UA with pH 6.0; urine anion gap 79    Recommendations:  -Start IV -150 ml/hr  -Encourage PO intake  -Obtain daily renal function panel  -Renal diet  -Avoid hypotension  -Renally dose all medications  -Avoid nephrotoxic agents  -Renal US ordered     We will sign off. Please call with any questions.       Subjective      Unable to obtain as patient declines to answer questions.     Objective     Physical Examination:  BP  Min: 109/61  Max: 182/91  Temp  Av.3 °F (36.8 °C)  Min: 97.2 °F (36.2 °C)  Max: 99.2 °F (37.3 °C)  Pulse  Av.7  Min: 75  Max: 95  Resp  Av.2  Min: 18  Max: 20  SpO2  Av.7 %  Min: 95 %  Max: 100 %  I/O last 3 completed shifts:  In: 955 [P.O.:880; I.V.:75]  Out: 1450 [Urine:1450]    General: alert and oriented, NAD  Eyes: conjunctivae non-icteric  Mouth: dry mucus membranes  Lungs: Unable to examine as patient declines pulmonary exam  Heart: Unable to examine as patient declines cardiac exam  Extremities: no lower extremity edema  Skin: No obvious ecchymosis, erythema, or rash    Laboratory:  Lab Results   Component Value Date    WBC 4.65 2025    HGB 10.6 (L) 2025    MCV 95 2025    MCH 29.9 2025    MCHC 31.4 (L) 2025    RDW 12.1  01/27/2025     01/27/2025    MPV 11.4 01/27/2025    PLTEST Appears normal 01/27/2025     Lab Results   Component Value Date    CREATININE 1.4 01/27/2025    BUN 34 (H) 01/27/2025     01/27/2025    K 5.3 (H) 01/27/2025     (H) 01/27/2025    CO2 17 (L) 01/27/2025       Radiology:  Imaging has been reviewed.     Current Medications:     Infusions:       Scheduled:   cloNIDine 0.1 mg/24 hr td ptwk  1 patch Transdermal Q7 Days    cyanocobalamin  1,000 mcg Oral Daily    enoxparin  40 mg Subcutaneous Q24H (prophylaxis, 1700)    folic acid  1 mg Oral Daily    gabapentin  300 mg Oral BID    melatonin  3 mg Oral Nightly    multivitamin  1 tablet Oral Daily    nicotine  1 patch Transdermal Daily    polyethylene glycol  17 g Oral Daily    [START ON 1/28/2025] thiamine (B-1) injection  200 mg Intravenous Daily    thiamine (B-1) injection  400 mg Intravenous Q8H        PRN:    Current Facility-Administered Medications:     acetaminophen, 650 mg, Oral, Q4H PRN    dextrose 50%, 12.5 g, Intravenous, PRN    dextrose 50%, 25 g, Intravenous, PRN    glucagon (human recombinant), 1 mg, Intramuscular, PRN    glucose, 16 g, Oral, PRN    glucose, 24 g, Oral, PRN    naloxone, 0.02 mg, Intravenous, PRN    ondansetron, 4 mg, Intravenous, Q6H PRN    sodium chloride 0.9%, 10 mL, Intravenous, Q12H PRN    ziprasidone, 20 mg, Oral, BID PRN    ziprasidone, 10 mg, Intramuscular, Q6H PRN     Sydni Mccarthy  [facility] Medical Student, Fourth Year

## 2025-01-27 NOTE — PLAN OF CARE
Before exploring placement options pt must receive state clearance.   SW placed call to [facility] to call in LOCET (Level of Care Eligibility Tool)   SW faxed PASRR (Level 1Pre-Admission Screening and Resident Review)  to [facility].   CELY to await [phone] for placement.     SNOW Porras  [phone]     01/27/25 1104   Post-Acute Status   Post-Acute Authorization Placement   Coverage phn   Discharge Plan   Discharge Plan A Skilled Nursing Facility

## 2025-01-27 NOTE — PROGRESS NOTES
"[facility] Medicine Progress Note    Admitting Team: [facility] Hospitalist Team A  Attending Physician: Stephanie Adhikari MD  Resident: Dru  Intern: Louann     Date of Admit: 1/20/2025    Subjective/Interval History      Had 2 code whites called overnight as patient was attempting to kick staff. He was given 10 mg IM Geodon during the first one and 5 mg olanzapine during the second one and later in the night he was given another 5 mg of haldol for agitation. Remains disoriented this morning, reported no complaints to me        Objective     Physical Examination:  BP (!) 151/70   Pulse 89   Temp 97.9 °F (36.6 °C) (Axillary)   Resp 19   Ht 5' 8" (1.727 m)   Wt 57.6 kg (126 lb 15.8 oz)   SpO2 95%   BMI 19.31 kg/m²    Physical Exam  Vitals and nursing note reviewed.   Constitutional:       Appearance: Normal appearance.      Comments: Disoriented on exam   HENT:      Head: Normocephalic.      Right Ear: External ear normal.      Left Ear: External ear normal.      Nose: Nose normal.      Mouth/Throat:      Mouth: Mucous membranes are moist.   Eyes:      Extraocular Movements: Extraocular movements intact.      Pupils: Pupils are equal, round, and reactive to light.   Cardiovascular:      Rate and Rhythm: Normal rate and regular rhythm.      Pulses: Normal pulses.      Heart sounds: Normal heart sounds.   Pulmonary:      Effort: Pulmonary effort is normal.      Breath sounds: Normal breath sounds.   Abdominal:      General: Abdomen is flat.      Palpations: Abdomen is soft.   Musculoskeletal:         General: Normal range of motion.   Skin:     General: Skin is warm.      Capillary Refill: Capillary refill takes less than 2 seconds.   Neurological:      General: No focal deficit present.      Mental Status: Mental status is at baseline. He is disoriented.   Psychiatric:         Cognition and Memory: Cognition is impaired. Memory is impaired.       Intake/Output:    Intake/Output Summary (Last 24 hours) at " 1/27/2025 0716  Last data filed at 1/26/2025 2131  Gross per 24 hour   Intake 480 ml   Output 800 ml   Net -320 ml     Net IO Since Admission: 540.48 mL [01/27/25 0716]    Laboratory data: reviewed     Microbiology data: reviewed   Blood cultures NG2D    EKG data: reviewed   Sinus Bradycardia     Radiology data: reviewed   US Liver with Doppler (xpd)   Final Result      No definite acute sonographic findings.      Additional details, as provided in the body of report.         Electronically signed by: Lorne Coleman   Date:    01/21/2025   Time:    12:52      CT Head Without Contrast   Final Result      1. No CT evidence of acute intracranial abnormality. Clinical correlation and further evaluation as warranted.   2. Generalized cerebral volume loss and findings suggestive of chronic microvascular ischemic change.   3. Paranasal sinus disease as above.         Electronically signed by: Paloma High MD   Date:    01/21/2025   Time:    01:22      X-Ray Chest AP Portable   Final Result      No acute process seen.         Electronically signed by: Lewis Samano MD   Date:    01/20/2025   Time:    10:58      US Retroperitoneal Complete    (Results Pending)         Current Medications:     Infusions:         Scheduled:   cyanocobalamin  1,000 mcg Oral Daily    enoxparin  30 mg Subcutaneous Q24H (prophylaxis, 1700)    folic acid  1 mg Oral Daily    gabapentin  300 mg Oral BID    melatonin  3 mg Oral Nightly    multivitamin  1 tablet Oral Daily    nicotine  1 patch Transdermal Daily    polyethylene glycol  17 g Oral Daily    [START ON 1/28/2025] thiamine (B-1) injection  200 mg Intravenous Daily    thiamine (B-1) injection  400 mg Intravenous Q8H        PRN:    Current Facility-Administered Medications:     acetaminophen, 650 mg, Oral, Q4H PRN    dextrose 50%, 12.5 g, Intravenous, PRN    dextrose 50%, 25 g, Intravenous, PRN    glucagon (human recombinant), 1 mg, Intramuscular, PRN    glucose, 16 g, Oral, PRN    glucose, 24  g, Oral, PRN    naloxone, 0.02 mg, Intravenous, PRN    OLANZapine, 5 mg, Intramuscular, Once PRN    ondansetron, 4 mg, Intravenous, Q6H PRN    sodium chloride 0.9%, 10 mL, Intravenous, Q12H PRN    ziprasidone, 20 mg, Oral, BID PRN    ziprasidone, 10 mg, Intramuscular, Q6H PRN      Assessment/Plan:     Solomon Terrazas is a 74 y.o. male with a PMHx of Tobacco Use disorder and Alcohol Use disorder. The patient presented on 1/20/2025 for REDD and Presyncope who was admitted for IV hydration and further workup all of which is resolved. Patient is now consistently disoriented and agitation requiring IM medications to keep patient and staff safe at time. His REDD reemerged and nephrology was consulted for evaluation of possible RTA I the setting of hyperK.     Encephalopathy  Alcohol use disorder  - unclear baseline mental status, attempting to contact family  - wax/waning; consideration for wernicke's encephalopathy given reported alcohol use history vs delirium; appears improving  - CT head w/ atrophic changes; electrolytes stable  - s/p gabapentin taper, is now on 300 mg BID  - increased sleep during day; is on delirium precautions, concern for hyperactive-->hypoactive delirium  - stopping CIWA   - Thiamine repletion IV , MVI, Folic acid     Presyncope vs Syncope   - Unclear story; patient's story does not reveal any true LOC  - Suspect vasovagal etiology potentially related to dehydration; however unclear  - May be due to alcohol intoxication as patient has history of significant alcohol use  - CT Head negative for acute abnormalities   - TTE and w/o valvulopathy or HF  - Continue to monitor on telemetry  - PT/OT eval: recommending SNF    Stage 1 Acute Kidney Injury  Metabolic acidosis  - Creatinine of 1.7 on admission; appears baseline is 1.3  - Urine studies ordered to evaluate etiology   -likely 2/2 to pre-renal etiology  - received gentle IVF initially  - Holding nephrotoxic meds; Monitor with daily CMP  - 1/25  Creatinine bump to 1.7 from 1.4, appears hypovolemic; will give gentle fluids and encourage hydration  - bicarb persistently low, with infrequent mild hyperK and hyperchloremia; urine anion gap of 79 which may be c/w type 4 RTA. Consideration of heparin induced etiology during hospitalization. UA not collected, urine pH on admit only 6.0.   - c/s nephrology regarding c/f RTA   - rec Renal US   - lower c/f RTA since hyperK not persistent      Tobacco Use Disorder  - patient on smoking cessation  -nicotine patches while inpatient.     Normocytic Anemia  Hypovitaminosis B12  - stable, ferritin elevated w/ low iron sat; consider mixed picture w/ nutritional deficiencies vs chronic inflammation  - repleting B12 IM, start PO supplementation    Hyperkalemia; resolved  -mild at 5.2 w/o evidence of EKG changes  -monitoring w/ daily CMP    Healthcare maintenance   -primary care provider: None     Diet: Adult reanl   VTE PPx: Heparin, renally dosed  Code Status: Full Code    Dispo: Pending clinical improvement vs placement, PT/OT rec JUAN  Estimated LOS: >72 hrs.     Diane Lew MD  [facility] PGY1

## 2025-01-27 NOTE — PROGRESS NOTES
Smoking cessation education follow-up: Pt continues to have altered mental state. Will follow up as needed.

## 2025-01-27 NOTE — PROGRESS NOTES
U Nephrology Progress Note    Assessment/Plan:     Solomon Terrazas is a 74 y.o. male with CKD3a, tobacco and EtOH use disorder, who [place] [facility] [place] for lightheadedness and weakness. [facility] consulted for hyperkalemia.      Non-oliguric REDD on CKD3a  - baseline Cr ~1.3 with eGFR 50s  - presented with lightheadness and Cr 1.8  - UA with pretty concentrated urine (SG 1.020) but otherwise unremarkable  - renal US ordered but not yet done  - has only received a little over 3L of IVFs since admission 7 days ago  - still appears dry on exam, unable to feed himself with restraints  - recommend adequate volume resuscitation with isotonic IVFs    Hyperchloremic metabolic acidosis  - unable attribute to RTA in setting of REDD  - likely 2/2 REDD  - no indication for bicarb supplementation at this time  - recommend adequate volume resuscitation with isotonic IVFs     Hyperkalemia  - likely 2/2 REDD and hypovolemia  - recommend IVFs as above      We will sign off at this time. Please call back with any questions.     Kath PHIPPS Do, MD  [facility] Nephrology Fellow, PGY-4     Subjective      Patient in restraints this morning, not answering questions coherently. Required PRN antipsychotics for agitation overnight.      Objective     Physical Examination:  BP  Min: 109/61  Max: 165/84  Temp  Av.5 °F (36.9 °C)  Min: 97.9 °F (36.6 °C)  Max: 99.2 °F (37.3 °C)  Pulse  Av.4  Min: 75  Max: 95  Resp  Av.4  Min: 18  Max: 20  SpO2  Av.6 %  Min: 95 %  Max: 100 %  I/O last 3 completed shifts:  In: 955 [P.O.:880; I.V.:75]  Out: 1450 [Urine:1450]    General: disoriented, ill-appearing  Mouth: mucus membranes dry  Neck: no JVD  Lungs: CTAB; breathing comfortably on RA  Heart: RRR  Extremities: no edema     Laboratory:  Lab Results   Component Value Date    WBC 4.65 2025    HGB 10.6 (L) 2025    MCV 95 2025    MCH 29.9 2025    MCHC 31.4 (L) 2025    RDW 12.1 2025      01/27/2025    MPV 11.4 01/27/2025    PLTEST Appears normal 01/27/2025     Lab Results   Component Value Date    CREATININE 1.4 01/27/2025    BUN 34 (H) 01/27/2025     01/27/2025    K 5.3 (H) 01/27/2025     (H) 01/27/2025    CO2 17 (L) 01/27/2025     Radiology:  Imaging has been reviewed.     Current Medications:     Infusions:       Scheduled:   cyanocobalamin  1,000 mcg Oral Daily    enoxparin  30 mg Subcutaneous Q24H (prophylaxis, 1700)    folic acid  1 mg Oral Daily    gabapentin  300 mg Oral BID    melatonin  3 mg Oral Nightly    multivitamin  1 tablet Oral Daily    nicotine  1 patch Transdermal Daily    polyethylene glycol  17 g Oral Daily    [START ON 1/28/2025] thiamine (B-1) injection  200 mg Intravenous Daily    thiamine (B-1) injection  400 mg Intravenous Q8H        PRN:    Current Facility-Administered Medications:     acetaminophen, 650 mg, Oral, Q4H PRN    dextrose 50%, 12.5 g, Intravenous, PRN    dextrose 50%, 25 g, Intravenous, PRN    glucagon (human recombinant), 1 mg, Intramuscular, PRN    glucose, 16 g, Oral, PRN    glucose, 24 g, Oral, PRN    naloxone, 0.02 mg, Intravenous, PRN    OLANZapine, 5 mg, Intramuscular, Once PRN    ondansetron, 4 mg, Intravenous, Q6H PRN    sodium chloride 0.9%, 10 mL, Intravenous, Q12H PRN    ziprasidone, 20 mg, Oral, BID PRN    ziprasidone, 10 mg, Intramuscular, Q6H PRN

## 2025-01-27 NOTE — NURSING
Spoke with Dr. Beltran of the medicine team regarding the patients IV. A nursing communication stating that it is ok for the patient to not have an IV will be placed. Will continue to monitor the patient.

## 2025-01-27 NOTE — PLAN OF CARE
SW sent SNF referrals via [facility], Cm will continue to follow pt through transitions of care and assist with any discharge needs.    Estevan Sams MSAICHA  [phone]    No future appointments.     01/27/25 0741   Post-Acute Status   Post-Acute Authorization Placement   Post-Acute Placement Status Referrals Sent   Coverage PHN   Discharge Delays None known at this time   Discharge Plan   Discharge Plan A Skilled Nursing Facility

## 2025-01-27 NOTE — NURSING
1637- Pt /9, he does not have IV access and continues to pull it out, he does swallow oral meds. MD notified at this time.

## 2025-01-28 PROBLEM — F03.918 DEMENTIA WITH BEHAVIORAL DISTURBANCE: Status: ACTIVE | Noted: 2025-01-28

## 2025-01-28 LAB
ALBUMIN SERPL BCP-MCNC: 3.3 G/DL (ref 3.5–5.2)
ALP SERPL-CCNC: 67 U/L (ref 40–150)
ALT SERPL W/O P-5'-P-CCNC: 16 U/L (ref 10–44)
ANION GAP SERPL CALC-SCNC: 11 MMOL/L (ref 8–16)
AST SERPL-CCNC: 34 U/L (ref 10–40)
BASOPHILS # BLD AUTO: 0.03 K/UL (ref 0–0.2)
BASOPHILS NFR BLD: 0.7 % (ref 0–1.9)
BILIRUB SERPL-MCNC: 0.9 MG/DL (ref 0.1–1)
BUN SERPL-MCNC: 33 MG/DL (ref 8–23)
CALCIUM SERPL-MCNC: 10.1 MG/DL (ref 8.7–10.5)
CHLORIDE SERPL-SCNC: 109 MMOL/L (ref 95–110)
CO2 SERPL-SCNC: 18 MMOL/L (ref 23–29)
CREAT SERPL-MCNC: 1.2 MG/DL (ref 0.5–1.4)
DIFFERENTIAL METHOD BLD: ABNORMAL
EOSINOPHIL # BLD AUTO: 0.1 K/UL (ref 0–0.5)
EOSINOPHIL NFR BLD: 2.2 % (ref 0–8)
ERYTHROCYTE [DISTWIDTH] IN BLOOD BY AUTOMATED COUNT: 12.1 % (ref 11.5–14.5)
EST. GFR  (NO RACE VARIABLE): >60 ML/MIN/1.73 M^2
GLUCOSE SERPL-MCNC: 92 MG/DL (ref 70–110)
HCT VFR BLD AUTO: 33.3 % (ref 40–54)
HGB BLD-MCNC: 10.5 G/DL (ref 14–18)
IMM GRANULOCYTES # BLD AUTO: 0.05 K/UL (ref 0–0.04)
IMM GRANULOCYTES NFR BLD AUTO: 1.2 % (ref 0–0.5)
LYMPHOCYTES # BLD AUTO: 0.8 K/UL (ref 1–4.8)
LYMPHOCYTES NFR BLD: 19.6 % (ref 18–48)
MAGNESIUM SERPL-MCNC: 2 MG/DL (ref 1.6–2.6)
MCH RBC QN AUTO: 29.9 PG (ref 27–31)
MCHC RBC AUTO-ENTMCNC: 31.5 G/DL (ref 32–36)
MCV RBC AUTO: 95 FL (ref 82–98)
MONOCYTES # BLD AUTO: 0.5 K/UL (ref 0.3–1)
MONOCYTES NFR BLD: 12.7 % (ref 4–15)
NEUTROPHILS # BLD AUTO: 2.7 K/UL (ref 1.8–7.7)
NEUTROPHILS NFR BLD: 63.6 % (ref 38–73)
NRBC BLD-RTO: 0 /100 WBC
PHOSPHATE SERPL-MCNC: 4.2 MG/DL (ref 2.7–4.5)
PLATELET # BLD AUTO: 250 K/UL (ref 150–450)
PMV BLD AUTO: 10.8 FL (ref 9.2–12.9)
POTASSIUM SERPL-SCNC: 5.2 MMOL/L (ref 3.5–5.1)
PROT SERPL-MCNC: 7.3 G/DL (ref 6–8.4)
RBC # BLD AUTO: 3.51 M/UL (ref 4.6–6.2)
SODIUM SERPL-SCNC: 138 MMOL/L (ref 136–145)
WBC # BLD AUTO: 4.18 K/UL (ref 3.9–12.7)

## 2025-01-28 PROCEDURE — 25000003 PHARM REV CODE 250

## 2025-01-28 PROCEDURE — 25000003 PHARM REV CODE 250: Performed by: STUDENT IN AN ORGANIZED HEALTH CARE EDUCATION/TRAINING PROGRAM

## 2025-01-28 PROCEDURE — 85025 COMPLETE CBC W/AUTO DIFF WBC: CPT

## 2025-01-28 PROCEDURE — 83735 ASSAY OF MAGNESIUM: CPT

## 2025-01-28 PROCEDURE — 90833 PSYTX W PT W E/M 30 MIN: CPT | Mod: ,,, | Performed by: PSYCHIATRY & NEUROLOGY

## 2025-01-28 PROCEDURE — 99233 SBSQ HOSP IP/OBS HIGH 50: CPT | Mod: ,,, | Performed by: PSYCHIATRY & NEUROLOGY

## 2025-01-28 PROCEDURE — 63600175 PHARM REV CODE 636 W HCPCS: Performed by: INTERNAL MEDICINE

## 2025-01-28 PROCEDURE — 11000001 HC ACUTE MED/SURG PRIVATE ROOM

## 2025-01-28 PROCEDURE — 36415 COLL VENOUS BLD VENIPUNCTURE: CPT

## 2025-01-28 PROCEDURE — 25000003 PHARM REV CODE 250: Performed by: INTERNAL MEDICINE

## 2025-01-28 PROCEDURE — 97535 SELF CARE MNGMENT TRAINING: CPT

## 2025-01-28 PROCEDURE — 97116 GAIT TRAINING THERAPY: CPT

## 2025-01-28 PROCEDURE — 25000003 PHARM REV CODE 250: Performed by: PSYCHIATRY & NEUROLOGY

## 2025-01-28 PROCEDURE — S4991 NICOTINE PATCH NONLEGEND: HCPCS

## 2025-01-28 PROCEDURE — 84100 ASSAY OF PHOSPHORUS: CPT

## 2025-01-28 PROCEDURE — 80053 COMPREHEN METABOLIC PANEL: CPT

## 2025-01-28 RX ORDER — GABAPENTIN 300 MG/1
300 CAPSULE ORAL ONCE
Status: COMPLETED | OUTPATIENT
Start: 2025-01-28 | End: 2025-01-28

## 2025-01-28 RX ORDER — THIAMINE HCL 100 MG
200 TABLET ORAL DAILY
Status: COMPLETED | OUTPATIENT
Start: 2025-01-28 | End: 2025-01-30

## 2025-01-28 RX ADMIN — THERA TABS 1 TABLET: TAB at 09:01

## 2025-01-28 RX ADMIN — GABAPENTIN 300 MG: 300 CAPSULE ORAL at 09:01

## 2025-01-28 RX ADMIN — MIRTAZAPINE 7.5 MG: 7.5 TABLET, FILM COATED ORAL at 09:01

## 2025-01-28 RX ADMIN — CYANOCOBALAMIN TAB 1000 MCG 1000 MCG: 1000 TAB at 09:01

## 2025-01-28 RX ADMIN — FOLIC ACID 1 MG: 1 TABLET ORAL at 09:01

## 2025-01-28 RX ADMIN — POLYETHYLENE GLYCOL 3350 17 G: 17 POWDER, FOR SOLUTION ORAL at 09:01

## 2025-01-28 RX ADMIN — Medication 200 MG: at 05:01

## 2025-01-28 RX ADMIN — Medication 3 MG: at 09:01

## 2025-01-28 RX ADMIN — NICOTINE 1 PATCH: 21 PATCH, EXTENDED RELEASE TRANSDERMAL at 09:01

## 2025-01-28 RX ADMIN — ENOXAPARIN SODIUM 40 MG: 40 INJECTION SUBCUTANEOUS at 05:01

## 2025-01-28 NOTE — PT/OT/SLP PROGRESS
Physical Therapy Treatment    Patient Name:  Solomon Terrazas   MRN:  [id number]    Recommendations:     Discharge Recommendations: Moderate Intensity Therapy  Discharge Equipment Recommendations: to be determined by next level of care  Barriers to discharge: Decreased caregiver support and poor cognition, requires increased assist with mobility    Assessment:     Solomon Terrazas is a 74 y.o. male admitted with a medical diagnosis of Syncope and collapse.  He presents with the following impairments/functional limitations: weakness, impaired endurance, impaired self care skills, impaired functional mobility, gait instability, impaired balance, impaired cognition, decreased coordination, decreased upper extremity function, decreased lower extremity function, decreased safety awareness, impaired cardiopulmonary response to activity       Patient seen for physical therapy session on this date, restraints removed from UE's this am.  Patient oriented to self, location, unable to state date.  Patient follows simple commands at 50% on this date.  Patient agreeable to participate and transitions to EOB with min assist.  Patient performs sit to stand to RW with min assist, decreased posterior lean.  Patient ambulated with RW and Min assist x ~60', flexed trunk, forward head, short step lengths, requires min assist to manage RW, unsteady at times.  Patient returns to supine in bed with CGA.  Therapist returns to room 30 min later to assist with stand pivot transfer to wheelchair for transport to [facility].  Patient performs stand step pivot transfer with min assist/HHA,  short shuffling steps.  Continue to recommend moderate intensity therapy at discharge.  Full report to follow.    Rehab Prognosis: Good; patient would benefit from acute skilled PT services to address these deficits and reach maximum level of function.    Recent Surgery: * No surgery found *      Plan:     During this hospitalization, patient to be seen 4 x/week  "to address the identified rehab impairments via gait training, therapeutic activities, therapeutic exercises, neuromuscular re-education and progress toward the following goals:    Plan of Care Expires:  02/26/25    Subjective     Chief Complaint: "I just gotta get my clothes and go"  Patient/Family Comments/goals: to return to PLOF  Pain/Comfort:  Pain Rating 1: 0/10  Pain Rating Post-Intervention 1: 0/10      Objective:     Communicated with nurse Carmelo prior to session.  Patient found supine with PureWick, telemetry, bed alarm, peripheral IV upon PT entry to room.     General Precautions: Standard, fall, hearing impaired  Orthopedic Precautions: N/A  Braces: N/A  Respiratory Status: Room air     Functional Mobility:  Bed Mobility:     Rolling Right: minimum assistance  Scooting: minimum assistance  Supine to Sit: minimum assistance  Sit to Supine: minimum assistance  Transfers:     Sit to Stand:  minimum assistance with rolling walker  Gait: Patient ambulated with RW and Min assist x ~60', flexed trunk, forward head, short step lengths, requires min assist to manage RW, unsteady at times.   Balance: Seated:  CGA at EOB sitting   Standing:  requires RW, Min Assist      AM-PAC 6 CLICK MOBILITY  Turning over in bed (including adjusting bedclothes, sheets and blankets)?: 3  Sitting down on and standing up from a chair with arms (e.g., wheelchair, bedside commode, etc.): 3  Moving from lying on back to sitting on the side of the bed?: 3  Moving to and from a bed to a chair (including a wheelchair)?: 3  Need to walk in hospital room?: 3  Climbing 3-5 steps with a railing?: 1  Basic Mobility Total Score: 16       Treatment & Education:    Patient seen for physical therapy session on this date, restraints removed from UE's this am.  Patient oriented to self, location, unable to state date.  Patient follows simple commands at 50% on this date.  Patient agreeable to participate and transitions to EOB with min assist.  " Patient performs sit to stand to RW with min assist, decreased posterior lean.  Patient ambulated with RW and Min assist x ~60', flexed trunk, forward head, short step lengths, requires min assist to manage RW, unsteady at times.  Patient returns to supine in bed with CGA.  Therapist returns to room 30 min later to assist with stand pivot transfer to wheelchair for transport to [facility] [place].  Patient performs stand step pivot transfer with min assist/HHA,  short shuffling steps.  Continue to recommend moderate intensity therapy at discharge      Patient left supine with all lines intact, call button in reach, bed alarm on, and nurse notified..    GOALS:   Multidisciplinary Problems       Physical Therapy Goals          Problem: Physical Therapy    Goal Priority Disciplines Outcome Interventions   Physical Therapy Goal     PT, PT/OT Progressing    Description: Goals to be met by: 25     Patient will increase functional independence with mobility by performin. Supine to sit with Modified [place]  2. Sit to supine with Modified [place]  3. Sit to stand transfer with Supervision/Mod I  4. Gait  x 100 feet with Supervision/ Mod I using LRAD.                          DME Justifications:  No DME recommended requiring DME justifications    Time Tracking:     PT Received On: 25  PT Start Time: 1005     PT Stop Time: 1020  PT Total Time (min): 15 min     Billable Minutes: Gait Training 15    Treatment Type: Treatment  PT/PTA: PT     Number of PTA visits since last PT visit: 0     2025

## 2025-01-28 NOTE — PLAN OF CARE
Medicare Message     Important Message from Medicare regarding Discharge Appeal Rights Other (comments)Important Message from Medicare regarding Discharge Appeal Rights. Other (comments). The comment is Patient unable to sign due to medical condition. Taken on 1/28/25 1015   Date IMM was signed 1/27/2025   Time IMM was signed 1117

## 2025-01-28 NOTE — PLAN OF CARE
Patient seen for physical therapy session on this date, restraints removed from UE's this am.  Patient oriented to self, location, unable to state date.  Patient follows simple commands at 50% on this date.  Patient agreeable to participate and transitions to EOB with min assist.  Patient performs sit to stand to RW with min assist, decreased posterior lean.  Patient ambulated with RW and Min assist x ~60', flexed trunk, forward head, short step lengths, requires min assist to manage RW, unsteady at times.  Patient returns to supine in bed with CGA.  Therapist returns to room 30 min later to assist with stand pivot transfer to wheelchair for transport to [facility].  Patient performs stand step pivot transfer with min assist/HHA,  short shuffling steps.  Continue to recommend moderate intensity therapy at discharge.  Full report to follow.      Problem: Physical Therapy  Goal: Physical Therapy Goal  Description: Goals to be met by: 25     Patient will increase functional independence with mobility by performin. Supine to sit with Modified [place]  2. Sit to supine with Modified [place]  3. Sit to stand transfer with Supervision/Mod I  4. Gait  x 100 feet with Supervision/ Mod I using LRAD.     Outcome: Progressing

## 2025-01-28 NOTE — PLAN OF CARE
Problem: Excessive Substance Use  Goal: Improved Behavioral Control (Excessive Substance Use)  Outcome: Progressing  Goal: Increased Participation and Engagement (Excessive Substance Use)  Outcome: Progressing  Goal: Improved Physiologic Symptoms (Excessive Substance Use)  Outcome: Progressing  Goal: Enhanced Social, Occupational or Functional Skills (Excessive Substance Use)  Outcome: Progressing     Problem: Syncope  Goal: Absence of Syncopal Symptoms  Outcome: Progressing     Problem: Fall Injury Risk  Goal: Absence of Fall and Fall-Related Injury  Outcome: Progressing     Problem: Restraint, Nonviolent  Goal: Absence of Harm or Injury  Outcome: Progressing

## 2025-01-28 NOTE — PROGRESS NOTES
"[facility] Medicine Progress Note    Admitting Team: hospitals Hospitalist Team A  Attending Physician: Stephanie Adhikari MD  Resident: Dru  Intern: Louann     Date of Admit: 1/20/2025    Subjective/Interval History      No acute events overnight. BP have been elevated since 3 am yesterday. Family is in agreement that patient needs placement. Cm working on SNF placement. Was evaluated by Dr. Goetz with psychiatry yesterday. Rested comfortably all night.        Objective     Physical Examination:  BP (!) 153/83 (BP Location: Right arm, Patient Position: Lying)   Pulse 70   Temp 97.6 °F (36.4 °C) (Axillary)   Resp 18   Ht 5' 8" (1.727 m)   Wt 57.6 kg (126 lb 15.8 oz)   SpO2 97%   BMI 19.31 kg/m²    Physical Exam  Vitals and nursing note reviewed.   Constitutional:       Appearance: Normal appearance.      Comments: Disoriented on exam   HENT:      Head: Normocephalic.      Right Ear: External ear normal.      Left Ear: External ear normal.      Nose: Nose normal.      Mouth/Throat:      Mouth: Mucous membranes are moist.   Eyes:      Extraocular Movements: Extraocular movements intact.      Pupils: Pupils are equal, round, and reactive to light.   Cardiovascular:      Rate and Rhythm: Normal rate and regular rhythm.      Pulses: Normal pulses.      Heart sounds: Normal heart sounds.   Pulmonary:      Effort: Pulmonary effort is normal.      Breath sounds: Normal breath sounds.   Abdominal:      General: Abdomen is flat.      Palpations: Abdomen is soft.   Musculoskeletal:         General: Normal range of motion.   Skin:     General: Skin is warm.      Capillary Refill: Capillary refill takes less than 2 seconds.   Neurological:      General: No focal deficit present.      Mental Status: Mental status is at baseline. He is disoriented.   Psychiatric:         Cognition and Memory: Cognition is impaired. Memory is impaired.       Intake/Output:  No intake or output data in the 24 hours ending 01/28/25 " 0705    Net IO Since Admission: 540.48 mL [01/28/25 0705]    Laboratory data: reviewed     Microbiology data: reviewed   Blood cultures NG2D    EKG data: reviewed   Sinus Bradycardia     Radiology data: reviewed   US Liver with Doppler (xpd)   Final Result      No definite acute sonographic findings.      Additional details, as provided in the body of report.         Electronically signed by: Lorne Coleman   Date:    01/21/2025   Time:    12:52      CT Head Without Contrast   Final Result      1. No CT evidence of acute intracranial abnormality. Clinical correlation and further evaluation as warranted.   2. Generalized cerebral volume loss and findings suggestive of chronic microvascular ischemic change.   3. Paranasal sinus disease as above.         Electronically signed by: Paloma High MD   Date:    01/21/2025   Time:    01:22      X-Ray Chest AP Portable   Final Result      No acute process seen.         Electronically signed by: Lewis Samano MD   Date:    01/20/2025   Time:    10:58      US Retroperitoneal Complete    (Results Pending)         Current Medications:     Infusions:         Scheduled:   cloNIDine 0.1 mg/24 hr td ptwk  1 patch Transdermal Q7 Days    cyanocobalamin  1,000 mcg Oral Daily    enoxparin  40 mg Subcutaneous Q24H (prophylaxis, 1700)    folic acid  1 mg Oral Daily    gabapentin  300 mg Oral BID    melatonin  3 mg Oral Nightly    mirtazapine  7.5 mg Oral QHS    multivitamin  1 tablet Oral Daily    nicotine  1 patch Transdermal Daily    polyethylene glycol  17 g Oral Daily    thiamine (B-1) injection  200 mg Intravenous Daily        PRN:    Current Facility-Administered Medications:     acetaminophen, 650 mg, Oral, Q4H PRN    dextrose 50%, 12.5 g, Intravenous, PRN    dextrose 50%, 25 g, Intravenous, PRN    glucagon (human recombinant), 1 mg, Intramuscular, PRN    glucose, 16 g, Oral, PRN    glucose, 24 g, Oral, PRN    naloxone, 0.02 mg, Intravenous, PRN    ondansetron, 4 mg, Intravenous,  Q6H PRN    sodium chloride 0.9%, 10 mL, Intravenous, Q12H PRN    ziprasidone, 20 mg, Oral, BID PRN    ziprasidone, 10 mg, Intramuscular, Q6H PRN      Assessment/Plan:     Solomon Terrazas is a 74 y.o. male with a PMHx of Tobacco Use disorder and Alcohol Use disorder. The patient presented on 1/20/2025 for REDD and Presyncope who was admitted for IV hydration and further workup all of which is resolved. Patient is now consistently disoriented and agitation requiring IM medications to keep patient and staff safe at time. His REDD reemerged and nephrology was consulted for evaluation of possible RTA I the setting of hyperK.     Encephalopathy  Alcohol use disorder  Hyperactive/Hypoactive delirium  - unclear baseline mental status, attempting to contact family  - wax/waning; consideration for wernicke's encephalopathy given reported alcohol use history vs delirium; appears improving  - CT head w/ atrophic changes; electrolytes stable  - s/p gabapentin taper, is now on 300 mg BID  - increased sleep during day; is on delirium precautions, concern for hyperactive-->hypoactive delirium  - delirium precautions in place now  - Dr. Goetz recommend Remeron 7.5 mg nightly, continue vit supplementation, and able to use Zyprexa 5 mg PO/IM q6h PRN for agitation  - Per Dr. Goetz's examine with patient, he does not have medical decision- making capacity  - Thiamine repletion IV , MVI, Folic acid     Presyncope vs Syncope   - Unclear story; patient's story does not reveal any true LOC  - Suspect vasovagal etiology potentially related to dehydration; however unclear  - May be due to alcohol intoxication as patient has history of significant alcohol use  - CT Head negative for acute abnormalities   - TTE and w/o valvulopathy or HF  - Continue to monitor on telemetry  - PT/OT eval: recommending SNF    Stage 1 Acute Kidney Injury  Metabolic acidosis  - Creatinine of 1.7 on admission; appears baseline is 1.3  - Urine studies ordered to evaluate  etiology   -likely 2/2 to pre-renal etiology  - received gentle IVF initially  - Holding nephrotoxic meds; Monitor with daily CMP  - 1/25 Creatinine bump to 1.7 from 1.4, appears hypovolemic; will give gentle fluids and encourage hydration  - bicarb persistently low, with infrequent mild hyperK and hyperchloremia; urine anion gap of 79 which may be c/w type 4 RTA. Consideration of heparin induced etiology during hospitalization. UA not collected, urine pH on admit only 6.0.   - c/s nephrology regarding c/f RTA   - rec Renal US   - lower c/f RTA since hyperK not persistent    - rec isotonic fluids  - sCr on 1/27 improving close to baseline 1.4     Tobacco Use Disorder  - patient on smoking cessation  -nicotine patches while inpatient.     Normocytic Anemia  Hypovitaminosis B12  - stable, ferritin elevated w/ low iron sat; consider mixed picture w/ nutritional deficiencies vs chronic inflammation  - repleting B12 IM, start PO supplementation    Hyperkalemia; resolved  -mild at 5.2 w/o evidence of EKG changes  -monitoring w/ daily CMP    Healthcare maintenance   -primary care provider: None     Diet: Adult reanl   VTE PPx: Heparin, renally dosed  Code Status: Full Code    Dispo: Pending clinical improvement vs placement, PT/OT rec JUAN  Estimated LOS: >72 hrs.     Diane Lew MD  [employer] Internal [facility] PGY1
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
## 2025-01-28 NOTE — PROGRESS NOTES
Pharmacist Intervention IV to PO Note    Solomon Terrazas is a 74 y.o. male being treated with IV medication thiamine    Patient Data:    Vital Signs (Most Recent):  Temp: 97.6 °F (36.4 °C) (01/28/25 1220)  Pulse: 76 (01/28/25 1220)  Resp: 18 (01/28/25 1220)  BP: (!) 142/83 (01/28/25 1220)  SpO2: 96 % (01/28/25 1220) Vital Signs (72h Range):  Temp:  [96 °F (35.6 °C)-99.2 °F (37.3 °C)]   Pulse:  [65-95]   Resp:  [16-20]   BP: (107-196)/()   SpO2:  [92 %-100 %]      CBC:  Recent Labs   Lab 01/26/25  0816 01/27/25  0510 01/28/25  0605   WBC 3.67* 4.65 4.18   RBC 3.37* 3.55* 3.51*   HGB 10.0* 10.6* 10.5*   HCT 31.6* 33.8* 33.3*    199 250   MCV 94 95 95   MCH 29.7 29.9 29.9   MCHC 31.6* 31.4* 31.5*     CMP:     Recent Labs   Lab 01/26/25  0816 01/27/25  0510 01/28/25  0605    105 92   CALCIUM 9.4 10.0 10.1   ALBUMIN 2.9* 3.4* 3.3*   PROT 6.4 7.3 7.3    139 138   K 4.8 5.3* 5.2*   CO2 21* 17* 18*   * 113* 109   BUN 38* 34* 33*   CREATININE 1.6* 1.4 1.2   ALKPHOS 61 68 67   ALT 12 18 16   AST 28 45* 34   BILITOT 0.6 0.7 0.9       Dietary Orders:  Diet Orders            Diet Renal Standard Tray: Renal starting at 01/27 0715            Based on the following criteria, this patient qualifies for intravenous to oral conversion:  [x] The patients gastrointestinal tract is functioning (tolerating medications via oral or enteral route for 24 hours and tolerating food or enteral feeds for 24 hours).  [x] The patient is hemodynamically stable for 24 hours (heart rate <100 beats per minute, systolic blood pressure >99 mm Hg, and respiratory rate <20 breaths per minute).  [x] The patient shows clinical improvement (afebrile for at least 24 hours and white blood cell count downtrending or normalized). Additionally, the patient must be non-neutropenic (absolute neutrophil count >500 cells/mm3).  [x] For antimicrobials, the patient has received IV therapy for at least 24 hours.    IV medication thiamine  will be changed to oral medication     Pharmacist's Name: Ruy Corrales  Pharmacist's Extension: [phone]

## 2025-01-29 PROBLEM — J69.0 ASPIRATION PNEUMONIA OF RIGHT LOWER LOBE: Status: ACTIVE | Noted: 2025-01-29

## 2025-01-29 PROBLEM — R50.9 FEVER: Status: ACTIVE | Noted: 2025-01-29

## 2025-01-29 LAB
ALBUMIN SERPL BCP-MCNC: 3.1 G/DL (ref 3.5–5.2)
ALP SERPL-CCNC: 65 U/L (ref 40–150)
ALT SERPL W/O P-5'-P-CCNC: 14 U/L (ref 10–44)
ANION GAP SERPL CALC-SCNC: 10 MMOL/L (ref 8–16)
ANION GAP SERPL CALC-SCNC: 9 MMOL/L (ref 8–16)
AST SERPL-CCNC: 25 U/L (ref 10–40)
BACTERIA #/AREA URNS HPF: ABNORMAL /HPF
BASOPHILS # BLD AUTO: 0.03 K/UL (ref 0–0.2)
BASOPHILS NFR BLD: 0.3 % (ref 0–1.9)
BILIRUB SERPL-MCNC: 0.8 MG/DL (ref 0.1–1)
BILIRUB UR QL STRIP: NEGATIVE
BUN SERPL-MCNC: 46 MG/DL (ref 8–23)
BUN SERPL-MCNC: 47 MG/DL (ref 8–23)
CALCIUM SERPL-MCNC: 9.3 MG/DL (ref 8.7–10.5)
CALCIUM SERPL-MCNC: 9.4 MG/DL (ref 8.7–10.5)
CHLORIDE SERPL-SCNC: 105 MMOL/L (ref 95–110)
CHLORIDE SERPL-SCNC: 107 MMOL/L (ref 95–110)
CK SERPL-CCNC: 312 U/L (ref 20–200)
CLARITY UR: ABNORMAL
CO2 SERPL-SCNC: 20 MMOL/L (ref 23–29)
CO2 SERPL-SCNC: 23 MMOL/L (ref 23–29)
COLOR UR: YELLOW
CREAT SERPL-MCNC: 2.7 MG/DL (ref 0.5–1.4)
CREAT SERPL-MCNC: 2.8 MG/DL (ref 0.5–1.4)
DIFFERENTIAL METHOD BLD: ABNORMAL
EOSINOPHIL # BLD AUTO: 0 K/UL (ref 0–0.5)
EOSINOPHIL NFR BLD: 0 % (ref 0–8)
ERYTHROCYTE [DISTWIDTH] IN BLOOD BY AUTOMATED COUNT: 12.2 % (ref 11.5–14.5)
EST. GFR  (NO RACE VARIABLE): 23 ML/MIN/1.73 M^2
EST. GFR  (NO RACE VARIABLE): 24 ML/MIN/1.73 M^2
GLUCOSE SERPL-MCNC: 101 MG/DL (ref 70–110)
GLUCOSE SERPL-MCNC: 132 MG/DL (ref 70–110)
GLUCOSE UR QL STRIP: NEGATIVE
HCT VFR BLD AUTO: 32.6 % (ref 40–54)
HGB BLD-MCNC: 10.5 G/DL (ref 14–18)
HGB UR QL STRIP: ABNORMAL
IMM GRANULOCYTES # BLD AUTO: 0.05 K/UL (ref 0–0.04)
IMM GRANULOCYTES NFR BLD AUTO: 0.5 % (ref 0–0.5)
INFLUENZA A, MOLECULAR: NOT DETECTED
INFLUENZA B, MOLECULAR: NOT DETECTED
KETONES UR QL STRIP: NEGATIVE
LEUKOCYTE ESTERASE UR QL STRIP: ABNORMAL
LYMPHOCYTES # BLD AUTO: 0.7 K/UL (ref 1–4.8)
LYMPHOCYTES NFR BLD: 5.9 % (ref 18–48)
MAGNESIUM SERPL-MCNC: 2 MG/DL (ref 1.6–2.6)
MCH RBC QN AUTO: 29.9 PG (ref 27–31)
MCHC RBC AUTO-ENTMCNC: 32.2 G/DL (ref 32–36)
MCV RBC AUTO: 93 FL (ref 82–98)
MICROSCOPIC COMMENT: ABNORMAL
MONOCYTES # BLD AUTO: 0.9 K/UL (ref 0.3–1)
MONOCYTES NFR BLD: 8 % (ref 4–15)
NEUTROPHILS # BLD AUTO: 9.4 K/UL (ref 1.8–7.7)
NEUTROPHILS NFR BLD: 85.3 % (ref 38–73)
NITRITE UR QL STRIP: NEGATIVE
NRBC BLD-RTO: 0 /100 WBC
PH UR STRIP: 6 [PH] (ref 5–8)
PHOSPHATE SERPL-MCNC: 3.6 MG/DL (ref 2.7–4.5)
PLATELET # BLD AUTO: 281 K/UL (ref 150–450)
PMV BLD AUTO: 11 FL (ref 9.2–12.9)
POTASSIUM SERPL-SCNC: 5 MMOL/L (ref 3.5–5.1)
POTASSIUM SERPL-SCNC: 5.5 MMOL/L (ref 3.5–5.1)
PROT SERPL-MCNC: 6.8 G/DL (ref 6–8.4)
PROT UR QL STRIP: ABNORMAL
RBC # BLD AUTO: 3.51 M/UL (ref 4.6–6.2)
RBC #/AREA URNS HPF: 2 /HPF (ref 0–4)
RSV AG BY MOLECULAR METHOD: NOT DETECTED
SARS-COV-2 RNA RESP QL NAA+PROBE: NOT DETECTED
SODIUM SERPL-SCNC: 137 MMOL/L (ref 136–145)
SODIUM SERPL-SCNC: 137 MMOL/L (ref 136–145)
SP GR UR STRIP: 1.02 (ref 1–1.03)
SQUAMOUS #/AREA URNS HPF: 1 /HPF
URN SPEC COLLECT METH UR: ABNORMAL
UROBILINOGEN UR STRIP-ACNC: NEGATIVE EU/DL
WBC # BLD AUTO: 10.96 K/UL (ref 3.9–12.7)
WBC #/AREA URNS HPF: 15 /HPF (ref 0–5)

## 2025-01-29 PROCEDURE — 97112 NEUROMUSCULAR REEDUCATION: CPT | Mod: CQ

## 2025-01-29 PROCEDURE — 87088 URINE BACTERIA CULTURE: CPT

## 2025-01-29 PROCEDURE — 0241U SARS-COV2 (COVID) WITH FLU/RSV BY PCR: CPT

## 2025-01-29 PROCEDURE — 87040 BLOOD CULTURE FOR BACTERIA: CPT | Performed by: STUDENT IN AN ORGANIZED HEALTH CARE EDUCATION/TRAINING PROGRAM

## 2025-01-29 PROCEDURE — 97116 GAIT TRAINING THERAPY: CPT | Mod: CQ

## 2025-01-29 PROCEDURE — 63600175 PHARM REV CODE 636 W HCPCS: Performed by: INTERNAL MEDICINE

## 2025-01-29 PROCEDURE — 92610 EVALUATE SWALLOWING FUNCTION: CPT

## 2025-01-29 PROCEDURE — 11000001 HC ACUTE MED/SURG PRIVATE ROOM

## 2025-01-29 PROCEDURE — 81000 URINALYSIS NONAUTO W/SCOPE: CPT

## 2025-01-29 PROCEDURE — 87086 URINE CULTURE/COLONY COUNT: CPT

## 2025-01-29 PROCEDURE — 80053 COMPREHEN METABOLIC PANEL: CPT

## 2025-01-29 PROCEDURE — 25000003 PHARM REV CODE 250: Performed by: INTERNAL MEDICINE

## 2025-01-29 PROCEDURE — 36415 COLL VENOUS BLD VENIPUNCTURE: CPT | Mod: XB

## 2025-01-29 PROCEDURE — 25000003 PHARM REV CODE 250: Performed by: STUDENT IN AN ORGANIZED HEALTH CARE EDUCATION/TRAINING PROGRAM

## 2025-01-29 PROCEDURE — 84100 ASSAY OF PHOSPHORUS: CPT

## 2025-01-29 PROCEDURE — 83735 ASSAY OF MAGNESIUM: CPT

## 2025-01-29 PROCEDURE — S4991 NICOTINE PATCH NONLEGEND: HCPCS

## 2025-01-29 PROCEDURE — 97530 THERAPEUTIC ACTIVITIES: CPT | Mod: CQ

## 2025-01-29 PROCEDURE — 63700000 PHARM REV CODE 250 ALT 637 W/O HCPCS

## 2025-01-29 PROCEDURE — 36415 COLL VENOUS BLD VENIPUNCTURE: CPT

## 2025-01-29 PROCEDURE — 25000003 PHARM REV CODE 250

## 2025-01-29 PROCEDURE — 97535 SELF CARE MNGMENT TRAINING: CPT

## 2025-01-29 PROCEDURE — 25000003 PHARM REV CODE 250: Performed by: PSYCHIATRY & NEUROLOGY

## 2025-01-29 PROCEDURE — 80048 BASIC METABOLIC PNL TOTAL CA: CPT | Mod: XB

## 2025-01-29 PROCEDURE — 85025 COMPLETE CBC W/AUTO DIFF WBC: CPT

## 2025-01-29 PROCEDURE — 82550 ASSAY OF CK (CPK): CPT

## 2025-01-29 PROCEDURE — 63600175 PHARM REV CODE 636 W HCPCS

## 2025-01-29 RX ORDER — OLANZAPINE 10 MG/2ML
5 INJECTION, POWDER, FOR SOLUTION INTRAMUSCULAR ONCE
Status: DISCONTINUED | OUTPATIENT
Start: 2025-01-30 | End: 2025-01-29

## 2025-01-29 RX ORDER — CEFTRIAXONE 1 G/1
1 INJECTION, POWDER, FOR SOLUTION INTRAMUSCULAR; INTRAVENOUS
Status: DISCONTINUED | OUTPATIENT
Start: 2025-01-29 | End: 2025-01-31 | Stop reason: HOSPADM

## 2025-01-29 RX ORDER — ACETAMINOPHEN 325 MG/1
650 TABLET ORAL ONCE
Status: COMPLETED | OUTPATIENT
Start: 2025-01-29 | End: 2025-01-29

## 2025-01-29 RX ORDER — OLANZAPINE 10 MG/2ML
5 INJECTION, POWDER, FOR SOLUTION INTRAMUSCULAR ONCE
Status: COMPLETED | OUTPATIENT
Start: 2025-01-30 | End: 2025-01-30

## 2025-01-29 RX ORDER — AZITHROMYCIN 250 MG/1
500 TABLET, FILM COATED ORAL DAILY
Status: COMPLETED | OUTPATIENT
Start: 2025-01-29 | End: 2025-01-31

## 2025-01-29 RX ADMIN — Medication 200 MG: at 11:01

## 2025-01-29 RX ADMIN — ENOXAPARIN SODIUM 40 MG: 40 INJECTION SUBCUTANEOUS at 04:01

## 2025-01-29 RX ADMIN — CYANOCOBALAMIN TAB 1000 MCG 1000 MCG: 1000 TAB at 11:01

## 2025-01-29 RX ADMIN — NICOTINE 1 PATCH: 21 PATCH, EXTENDED RELEASE TRANSDERMAL at 11:01

## 2025-01-29 RX ADMIN — POLYETHYLENE GLYCOL 3350 17 G: 17 POWDER, FOR SOLUTION ORAL at 11:01

## 2025-01-29 RX ADMIN — Medication 3 MG: at 09:01

## 2025-01-29 RX ADMIN — MIRTAZAPINE 7.5 MG: 7.5 TABLET, FILM COATED ORAL at 09:01

## 2025-01-29 RX ADMIN — SODIUM CHLORIDE, POTASSIUM CHLORIDE, SODIUM LACTATE AND CALCIUM CHLORIDE 1000 ML: 600; 310; 30; 20 INJECTION, SOLUTION INTRAVENOUS at 11:01

## 2025-01-29 RX ADMIN — FOLIC ACID 1 MG: 1 TABLET ORAL at 11:01

## 2025-01-29 RX ADMIN — ACETAMINOPHEN 650 MG: 325 TABLET ORAL at 06:01

## 2025-01-29 RX ADMIN — AZITHROMYCIN DIHYDRATE 500 MG: 250 TABLET ORAL at 11:01

## 2025-01-29 RX ADMIN — CEFTRIAXONE SODIUM 1 G: 1 INJECTION, POWDER, FOR SOLUTION INTRAMUSCULAR; INTRAVENOUS at 11:01

## 2025-01-29 RX ADMIN — THERA TABS 1 TABLET: TAB at 11:01

## 2025-01-29 NOTE — PT/OT/SLP EVAL
Speech Language Pathology Evaluation  Bedside Swallow    Patient Name:  Solomon Terrazas   MRN:  [id number]  Admitting Diagnosis: Syncope and collapse    Recommendations:                 Diet recommendations:  Soft & Bite Sized Diet - IDDSI Level 6, Thin liquids - IDDSI Level 0   Aspiration Precautions: pt needs assistance and guidance with eating meals, slow rate, alternate sips and bites, provide verbal instructions to him, crush meds    General Precautions: Standard, fall  Communication strategies:  [place]    Assessment:     Solomon Terrazas is a 74 y.o. male admitted with Syncope and collapse who may start back on oral diet including: soft diet and thin liquids.  Pt does need assistance with feeding.   Pt does not initiate to self feed or ask for assist with feeding. Pt had no awareness that his breakfast tray was sitting in the room.     History per MD      Solomon Terrazas is a 74 y.o. male with a PMHx of Tobacco Use disorder and Alcohol Use disorder. The patient presented on 1/20/2025 for evaluation of new onset weakness/dizziness.   Patient unable to recall the events from this morning stating they occurred too long ago that he doesn't truly remember. Said he feels fine now and is not sure why he is in the hospital. Unable to recall a episode of LOC but not able to fully recall any event besides being picked up by the hospital. Per OSH patient was riding his bike and went inside to get some coffee when he started feeling like he was going to pass out.    He currently denies chest pain, palpitations nausea, vomiting, diarrhea, shortness of breath, abdominal pain,  hematuria, hematemesis, melena/hematochezia, auditory or visual hallucinations.       History reviewed. No pertinent past medical history.    History reviewed. No pertinent surgical history.    Social History: unknown     Chest X-Rays: There is calcification along the wall of the aorta.  No pleural effusion is present.  The osseous structures show degenerative  "change.  Subtle opacity is present at the right lung base.  In the setting of fever, this may represent a developing infiltrate/infection.     Prior diet: unknown, regular diet?    Subjective     Pt seen at bedside for swallow study. Upon entry, pt fidgeting and pulling off sheets. Pt insistent that something is wrong with his leg. He kept asking SLP to "just look at it." Nothing appeared abnormal with his feet (uncovered and no socks on). RN was alerted.   Patient goals: "are we leaving right now?"     Pain/Comfort:  Pain Rating 1:  (does not rate pain)  Location 1:  (states his legs are bothering him)  Pain Addressed 1: Distraction    Respiratory Status: Room air    Objective:   Upon entry, breakfast tray present. Pt able to state his name with some delay noted. He is aware that he is in a [facility] place but not sure if this is a hospital.   He was able to state his month and day of birth only.  Pt needed verbal instructions and redirection to engage with SLP.   Pt asked SLP to look at the camera, "it's watching me."  He did agree to consume his breakfast.     Oral Musculature Evaluation  Oral Musculature: general weakness, unable to assess due to poor participation/comprehension  Dentition: scattered dentition  Secretion Management: adequate  Mucosal Quality: dry  Lingual Strength and Mobility:  (fair)  Volitional Cough: elicited  Volitional Swallow: slight delay in swallow trigger  Voice Prior to PO Intake: raspy voice    Bedside Swallow Eval: SLP fed patient his whole breakfast tray  Consistencies Assessed:  Thin liquids coffee by cup swallows, water by cup and straw   Puree pudding bites   Soft solids scrambled eggs, bites of peaches   Solids toast from breakfast tray       Oral Phase:   Excess chewing on sausage- reported he did not like the taste   Lingual residue cleared with sips of coffee or water   Slow oral transit time  Fair ap transfer with eggs and peaches     Pharyngeal Phase:   delayed swallow " initation  no overt clinical signs/symptoms of aspiration  no overt clinical signs/symptoms of pharyngeal dysphagia  multiple spontaneous swallows    Compensatory Strategies  Slow rate  Alternate sips and bites  Make sure oral cavity is clear before the next bite  Encouragement to continue with meal     Treatment: Attempted to educate pt on role of SLP, importance of nutrition, current diet recs and adherence to swallow precautions including asking for  assistance to eat his meal (tray set-up, open containers, make sure he is upright in bed).   No return of info back to SLP.     Goals:   Multidisciplinary Problems       SLP Goals       Not on file                    Plan:     Patient to be seen:      Plan of Care expires:     Plan of Care reviewed with:  patient   SLP Follow-Up:  No       Discharge recommendations:  Moderate Intensity Therapy   Barriers to Discharge:  none per SLP    Time Tracking:     SLP Treatment Date:   01/29/25  Speech Start Time:  0913  Speech Stop Time:  0936     Speech Total Time (min):  23 min    Billable Minutes: Eval Swallow and Oral Function 14 and Self Care/Home Management Training 9    01/29/2025

## 2025-01-29 NOTE — PT/OT/SLP PROGRESS
Occupational Therapy   Treatment    Name: Solomon Terrazas  MRN: [id number]  Admitting Diagnosis:  Syncope and collapse       Recommendations:     Discharge Recommendations: Moderate Intensity Therapy  Discharge Equipment Recommendations:  to be determined by next level of care  Barriers to discharge:  Decreased caregiver support    Assessment:     Solomon Terrazas is a 74 y.o. male with a medical diagnosis of Syncope and collapse.  He presents with decreased safety awareness, in deep sleep prior to session and difficulty to wake but agreeable to OOB activity. Performance deficits affecting function are weakness, impaired endurance, impaired self care skills, impaired functional mobility, gait instability, impaired balance, impaired cognition, decreased safety awareness, decreased lower extremity function, decreased upper extremity function, impaired cardiopulmonary response to activity.     Rehab Prognosis:  Good; patient would benefit from acute skilled OT services to address these deficits and reach maximum level of function.       Plan:     Patient to be seen 3 x/week to address the above listed problems via self-care/home management, therapeutic activities, therapeutic exercises  Plan of Care Expires: 02/25/25  Plan of Care Reviewed with: patient    Subjective     Chief Complaint: Pt reports he wants to get back to bed  Patient/Family Comments/goals: To return to PLOF  Pain/Comfort:  Pain Rating 1: 0/10    Objective:     Communicated with: nsg prior to session.  Patient found right sidelying with bed alarm, telemetry, PureWick upon OT entry to room.    General Precautions: Standard, hearing impaired, fall    Orthopedic Precautions:N/A  Braces: N/A  Respiratory Status: Room air     Occupational Performance:     Bed Mobility:    Patient completed Rolling/Turning to Right with supervision  Patient completed Scooting/Bridging with contact guard assistance  Patient completed Supine to Sit with contact guard  assistance  Patient completed Sit to Supine with minimum assistance     Functional Mobility/Transfers:  Patient completed Sit <> Stand Transfer with minimum assistance  with  rolling walker   Patient completed Toilet Transfer Step Transfer technique with contact guard assistance and minimum assistance with  rolling walker to ambulate into bathroom but then appeared confused and turned to leave bathroom without toileting.   Functional Mobility: Pt with fair to fair- dynamic seated and standing balance.     Activities of Daily Living:  Upper Body Dressing: moderate assistance to don gown as robe seated EOB  Lower Body Dressing: moderate assistance to don undergarments seated EOB      [facility] 6 Click ADL: 16    Treatment & Education:  Pt educated on role of OT and POC.   Pt performing skills as listed above.      Patient left HOB elevated with all lines intact, call button in reach, bed alarm on, nsg notified, and nsg present    GOALS:   Multidisciplinary Problems       Occupational Therapy Goals          Problem: Occupational Therapy    Goal Priority Disciplines Outcome Interventions   Occupational Therapy Goal     OT, PT/OT Progressing    Description: Goals to be met by: 02/25/25     Patient will increase functional independence with ADLs by performing:    UE Dressing with Modified [place].  LE Dressing with Modified [place] and Assistive Devices as needed.  Grooming while standing at sink with Modified [place].  Toileting from toilet with Modified [place]yrus for hygiene and clothing management.   Toilet transfer to toilet with Modified Cyrus.  Upper extremity exercise program 3x10 reps per handout, with supervision.                           Time Tracking:     OT Date of Treatment: 01/28/25  OT Start Time: 1503  OT Stop Time: 1517  OT Total Time (min): 14 min    Billable Minutes:Self Care/Home Management 14    OT/EARL: OT     Number of EARL visits since last OT visit: 0    1/28/2025

## 2025-01-29 NOTE — NURSING
Temp-101.9. Pt assessed; no symptoms noted. Shannon notified and ordered blood cultures ordered, chest x-ray, and tylenol ordered.

## 2025-01-29 NOTE — PROGRESS NOTES
"[facility] Medicine Progress Note    Admitting Team: [facility] Hospitalist Team A  Attending Physician: Stephanie Adhikari MD  Resident: Dru  Intern: Louann     Date of Admit: 1/20/2025    Subjective/Interval History      Patient febrile to 101.9 overnight. BCX and chest x-ray ordered but overnight resident.   This morning patient was oriented to name only. Denied any pains or complaints this morning.   Patient BP lower this morning than previous days. Still trying to get in contact with family on a consistent basis to determine dispo     Objective     Physical Examination:  BP (!) 104/55 (BP Location: Left arm, Patient Position: Lying)   Pulse 94   Temp (!) 101.9 °F (38.8 °C) (Oral)   Resp 17   Ht 5' 8" (1.727 m)   Wt 57.6 kg (126 lb 15.8 oz)   SpO2 97%   BMI 19.31 kg/m²    Physical Exam  Vitals and nursing note reviewed.   Constitutional:       Appearance: Normal appearance.      Comments: Disoriented on exam   HENT:      Head: Normocephalic.      Right Ear: External ear normal.      Left Ear: External ear normal.      Nose: Nose normal.      Mouth/Throat:      Mouth: Mucous membranes are moist.   Eyes:      Extraocular Movements: Extraocular movements intact.      Pupils: Pupils are equal, round, and reactive to light.   Cardiovascular:      Rate and Rhythm: Normal rate and regular rhythm.      Pulses: Normal pulses.      Heart sounds: Normal heart sounds.   Pulmonary:      Effort: Pulmonary effort is normal.      Breath sounds: Normal breath sounds.   Abdominal:      General: Abdomen is flat.      Palpations: Abdomen is soft.   Musculoskeletal:         General: Normal range of motion.   Skin:     General: Skin is warm.      Capillary Refill: Capillary refill takes less than 2 seconds.   Neurological:      General: No focal deficit present.      Mental Status: Mental status is at baseline. He is disoriented.   Psychiatric:         Cognition and Memory: Cognition is impaired. Memory is impaired. "       Intake/Output:    Intake/Output Summary (Last 24 hours) at 1/29/2025 0716  Last data filed at 1/29/2025 0636  Gross per 24 hour   Intake --   Output 600 ml   Net -600 ml       Net IO Since Admission: -59.52 mL [01/29/25 0716]    Laboratory data: reviewed     Microbiology data: reviewed   Blood cultures NG2D    EKG data: reviewed   Sinus Bradycardia     Radiology data: reviewed   US Retroperitoneal Complete   Final Result      No significant abnormality.         Electronically signed by: Lisseth Garrido MD   Date:    01/28/2025   Time:    13:26      US Liver with Doppler (xpd)   Final Result      No definite acute sonographic findings.      Additional details, as provided in the body of report.         Electronically signed by: Lorne Coleman   Date:    01/21/2025   Time:    12:52      CT Head Without Contrast   Final Result      1. No CT evidence of acute intracranial abnormality. Clinical correlation and further evaluation as warranted.   2. Generalized cerebral volume loss and findings suggestive of chronic microvascular ischemic change.   3. Paranasal sinus disease as above.         Electronically signed by: Paloma High MD   Date:    01/21/2025   Time:    01:22      X-Ray Chest AP Portable   Final Result      No acute process seen.         Electronically signed by: Lewis Samano MD   Date:    01/20/2025   Time:    10:58      X-Ray Chest 1 View    (Results Pending)         Current Medications:     Infusions:         Scheduled:   cloNIDine 0.1 mg/24 hr td ptwk  1 patch Transdermal Q7 Days    cyanocobalamin  1,000 mcg Oral Daily    enoxparin  40 mg Subcutaneous Q24H (prophylaxis, 1700)    folic acid  1 mg Oral Daily    melatonin  3 mg Oral Nightly    mirtazapine  7.5 mg Oral QHS    multivitamin  1 tablet Oral Daily    nicotine  1 patch Transdermal Daily    polyethylene glycol  17 g Oral Daily    thiamine  200 mg Oral Daily        PRN:    Current Facility-Administered Medications:     acetaminophen, 650  mg, Oral, Q4H PRN    dextrose 50%, 12.5 g, Intravenous, PRN    dextrose 50%, 25 g, Intravenous, PRN    glucagon (human recombinant), 1 mg, Intramuscular, PRN    glucose, 16 g, Oral, PRN    glucose, 24 g, Oral, PRN    naloxone, 0.02 mg, Intravenous, PRN    ondansetron, 4 mg, Intravenous, Q6H PRN    sodium chloride 0.9%, 10 mL, Intravenous, Q12H PRN      Assessment/Plan:     Solomon Terrazas is a 74 y.o. male with a PMHx of Tobacco Use disorder and Alcohol Use disorder. The patient presented on 1/20/2025 for REDD and Presyncope who was admitted for IV hydration and further workup all of which is resolved. Patient is now consistently disoriented and agitation requiring IM medications to keep patient and staff safe at time. His REDD reemerged and nephrology was consulted for evaluation of possible RTA I the setting of hyperK.     Fever of unknown origin   - Patient febrile overnight to 102, WBC increased from 4 to 11   - BCX ordered, UA with UCX ordered  - chest x-ray done w/ possible increased opacity on RLL   - patient began eating more yesterday so possible this could be an aspiration event  - Plan to start Zosyn today after obtaining IV access   - DVT US ordered    - most recent BP 85/52    Encephalopathy  Alcohol use disorder  Hyperactive/Hypoactive delirium  - unclear baseline mental status, attempting to contact family  - wax/waning; consideration for wernicke's encephalopathy given reported alcohol use history vs delirium; appears improving  - CT head w/ atrophic changes; electrolytes stable  - s/p gabapentin taper, is now on 300 mg BID  - increased sleep during day; is on delirium precautions, concern for hyperactive-->hypoactive delirium  - delirium precautions in place now  - Dr. Goetz recommend Remeron 7.5 mg nightly, continue vit supplementation, and able to use Zyprexa 5 mg PO/IM q6h PRN for agitation  - Per Dr. Goetz's examine with patient, he does not have medical decision- making capacity  - Thiamine  repletion IV , MVI, Folic acid     Presyncope vs Syncope   - Unclear story; patient's story does not reveal any true LOC  - Suspect vasovagal etiology potentially related to dehydration; however unclear  - May be due to alcohol intoxication as patient has history of significant alcohol use  - CT Head negative for acute abnormalities   - TTE and w/o valvulopathy or HF  - Continue to monitor on telemetry  - PT/OT eval: recommending SNF    Stage 1 Acute Kidney Injury  Metabolic acidosis  - Creatinine of 1.7 on admission; appears baseline is 1.3  - Urine studies ordered to evaluate etiology   -likely 2/2 to pre-renal etiology  - received gentle IVF initially  - Holding nephrotoxic meds; Monitor with daily CMP  - 1/25 Creatinine bump to 1.7 from 1.4, appears hypovolemic; will give gentle fluids and encourage hydration  - bicarb persistently low, with infrequent mild hyperK and hyperchloremia; urine anion gap of 79 which may be c/w type 4 RTA. Consideration of heparin induced etiology during hospitalization. UA not collected, urine pH on admit only 6.0.   - c/s nephrology regarding c/f RTA   - rec Renal US   - lower c/f RTA since hyperK not persistent    - rec isotonic fluids  - sCr on 1/27 improving close to baseline 1.4     Tobacco Use Disorder  - patient on smoking cessation  -nicotine patches while inpatient.     Normocytic Anemia  Hypovitaminosis B12  - stable, ferritin elevated w/ low iron sat; consider mixed picture w/ nutritional deficiencies vs chronic inflammation  - repleting B12 IM, start PO supplementation    Hyperkalemia; resolved  -mild at 5.2 w/o evidence of EKG changes  -monitoring w/ daily CMP    Healthcare maintenance   -primary care provider: None     Diet: Adult reanl   VTE PPx: Heparin, renally dosed  Code Status: Full Code    Dispo: Pending clinical improvement vs placement, PT/OT rec JUAN  Estimated LOS: >72 hrs.     Diane Lew MD  [employer] [facility] PGY1

## 2025-01-29 NOTE — NURSING
Plan of care is to remove restraints in order for patient to get placement, encourage PO water intake, per personal conversation with MD Marcia

## 2025-01-29 NOTE — PLAN OF CARE
ST orders for swallow eval received, pt seen at bedside with breakfast tray in room. Pt with no awareness that tray was sitting on tray table. Pt did appear distracted and fidgeting with his sheets. Pt safe to continue on soft diet and thin liquids but does need assistance and guidance with eating meals. Pt did require redirection throughout session.

## 2025-01-30 LAB
ALBUMIN SERPL BCP-MCNC: 3.1 G/DL (ref 3.5–5.2)
ALP SERPL-CCNC: 67 U/L (ref 40–150)
ALT SERPL W/O P-5'-P-CCNC: 34 U/L (ref 10–44)
ANION GAP SERPL CALC-SCNC: 9 MMOL/L (ref 8–16)
AST SERPL-CCNC: 94 U/L (ref 10–40)
BASOPHILS # BLD AUTO: 0.02 K/UL (ref 0–0.2)
BASOPHILS NFR BLD: 0.3 % (ref 0–1.9)
BILIRUB SERPL-MCNC: 0.8 MG/DL (ref 0.1–1)
BUN SERPL-MCNC: 43 MG/DL (ref 8–23)
CALCIUM SERPL-MCNC: 9.5 MG/DL (ref 8.7–10.5)
CHLORIDE SERPL-SCNC: 105 MMOL/L (ref 95–110)
CK SERPL-CCNC: 2850 U/L (ref 20–200)
CO2 SERPL-SCNC: 21 MMOL/L (ref 23–29)
CREAT SERPL-MCNC: 1.8 MG/DL (ref 0.5–1.4)
DIFFERENTIAL METHOD BLD: ABNORMAL
EOSINOPHIL # BLD AUTO: 0 K/UL (ref 0–0.5)
EOSINOPHIL NFR BLD: 0.1 % (ref 0–8)
ERYTHROCYTE [DISTWIDTH] IN BLOOD BY AUTOMATED COUNT: 12.2 % (ref 11.5–14.5)
EST. GFR  (NO RACE VARIABLE): 39 ML/MIN/1.73 M^2
GLUCOSE SERPL-MCNC: 102 MG/DL (ref 70–110)
HCT VFR BLD AUTO: 33.7 % (ref 40–54)
HGB BLD-MCNC: 10.5 G/DL (ref 14–18)
IMM GRANULOCYTES # BLD AUTO: 0.02 K/UL (ref 0–0.04)
IMM GRANULOCYTES NFR BLD AUTO: 0.3 % (ref 0–0.5)
LYMPHOCYTES # BLD AUTO: 0.7 K/UL (ref 1–4.8)
LYMPHOCYTES NFR BLD: 10.5 % (ref 18–48)
MAGNESIUM SERPL-MCNC: 2.1 MG/DL (ref 1.6–2.6)
MCH RBC QN AUTO: 29 PG (ref 27–31)
MCHC RBC AUTO-ENTMCNC: 31.2 G/DL (ref 32–36)
MCV RBC AUTO: 93 FL (ref 82–98)
MONOCYTES # BLD AUTO: 0.8 K/UL (ref 0.3–1)
MONOCYTES NFR BLD: 11.2 % (ref 4–15)
NEUTROPHILS # BLD AUTO: 5.5 K/UL (ref 1.8–7.7)
NEUTROPHILS NFR BLD: 77.6 % (ref 38–73)
NRBC BLD-RTO: 0 /100 WBC
PHOSPHATE SERPL-MCNC: 3.9 MG/DL (ref 2.7–4.5)
PLATELET # BLD AUTO: 314 K/UL (ref 150–450)
PMV BLD AUTO: 10.4 FL (ref 9.2–12.9)
POTASSIUM SERPL-SCNC: 4.9 MMOL/L (ref 3.5–5.1)
PROT SERPL-MCNC: 7.1 G/DL (ref 6–8.4)
RBC # BLD AUTO: 3.62 M/UL (ref 4.6–6.2)
SODIUM SERPL-SCNC: 135 MMOL/L (ref 136–145)
WBC # BLD AUTO: 7.05 K/UL (ref 3.9–12.7)

## 2025-01-30 PROCEDURE — 82550 ASSAY OF CK (CPK): CPT

## 2025-01-30 PROCEDURE — 99233 SBSQ HOSP IP/OBS HIGH 50: CPT | Mod: ,,, | Performed by: PSYCHIATRY & NEUROLOGY

## 2025-01-30 PROCEDURE — 85025 COMPLETE CBC W/AUTO DIFF WBC: CPT

## 2025-01-30 PROCEDURE — 84100 ASSAY OF PHOSPHORUS: CPT

## 2025-01-30 PROCEDURE — 25000003 PHARM REV CODE 250: Performed by: INTERNAL MEDICINE

## 2025-01-30 PROCEDURE — 83735 ASSAY OF MAGNESIUM: CPT

## 2025-01-30 PROCEDURE — 63600175 PHARM REV CODE 636 W HCPCS

## 2025-01-30 PROCEDURE — 25000003 PHARM REV CODE 250

## 2025-01-30 PROCEDURE — 30200315 PPD INTRADERMAL TEST REV CODE 302: Performed by: INTERNAL MEDICINE

## 2025-01-30 PROCEDURE — S4991 NICOTINE PATCH NONLEGEND: HCPCS

## 2025-01-30 PROCEDURE — 63600175 PHARM REV CODE 636 W HCPCS: Performed by: STUDENT IN AN ORGANIZED HEALTH CARE EDUCATION/TRAINING PROGRAM

## 2025-01-30 PROCEDURE — 92526 ORAL FUNCTION THERAPY: CPT

## 2025-01-30 PROCEDURE — 63600175 PHARM REV CODE 636 W HCPCS: Performed by: INTERNAL MEDICINE

## 2025-01-30 PROCEDURE — 11000001 HC ACUTE MED/SURG PRIVATE ROOM

## 2025-01-30 PROCEDURE — 25000003 PHARM REV CODE 250: Performed by: PSYCHIATRY & NEUROLOGY

## 2025-01-30 PROCEDURE — 25000003 PHARM REV CODE 250: Performed by: STUDENT IN AN ORGANIZED HEALTH CARE EDUCATION/TRAINING PROGRAM

## 2025-01-30 PROCEDURE — 63700000 PHARM REV CODE 250 ALT 637 W/O HCPCS

## 2025-01-30 PROCEDURE — 90833 PSYTX W PT W E/M 30 MIN: CPT | Mod: ,,, | Performed by: PSYCHIATRY & NEUROLOGY

## 2025-01-30 PROCEDURE — 86580 TB INTRADERMAL TEST: CPT | Performed by: INTERNAL MEDICINE

## 2025-01-30 PROCEDURE — 80053 COMPREHEN METABOLIC PANEL: CPT

## 2025-01-30 RX ORDER — OLANZAPINE 10 MG/2ML
5 INJECTION, POWDER, FOR SOLUTION INTRAMUSCULAR ONCE
Status: COMPLETED | OUTPATIENT
Start: 2025-01-30 | End: 2025-01-30

## 2025-01-30 RX ORDER — SODIUM CHLORIDE, SODIUM LACTATE, POTASSIUM CHLORIDE, CALCIUM CHLORIDE 600; 310; 30; 20 MG/100ML; MG/100ML; MG/100ML; MG/100ML
INJECTION, SOLUTION INTRAVENOUS CONTINUOUS
Status: ACTIVE | OUTPATIENT
Start: 2025-01-30 | End: 2025-01-31

## 2025-01-30 RX ORDER — ARIPIPRAZOLE 2 MG/1
2 TABLET ORAL DAILY
Status: DISCONTINUED | OUTPATIENT
Start: 2025-01-31 | End: 2025-01-31 | Stop reason: HOSPADM

## 2025-01-30 RX ADMIN — TUBERCULIN PURIFIED PROTEIN DERIVATIVE 5 UNITS: 5 INJECTION INTRADERMAL at 04:01

## 2025-01-30 RX ADMIN — ENOXAPARIN SODIUM 40 MG: 40 INJECTION SUBCUTANEOUS at 04:01

## 2025-01-30 RX ADMIN — OLANZAPINE 5 MG: 10 INJECTION, POWDER, FOR SOLUTION INTRAMUSCULAR at 12:01

## 2025-01-30 RX ADMIN — OLANZAPINE 5 MG: 10 INJECTION, POWDER, FOR SOLUTION INTRAMUSCULAR at 11:01

## 2025-01-30 RX ADMIN — CEFTRIAXONE SODIUM 1 G: 1 INJECTION, POWDER, FOR SOLUTION INTRAMUSCULAR; INTRAVENOUS at 12:01

## 2025-01-30 RX ADMIN — SODIUM CHLORIDE, POTASSIUM CHLORIDE, SODIUM LACTATE AND CALCIUM CHLORIDE 1000 ML: 600; 310; 30; 20 INJECTION, SOLUTION INTRAVENOUS at 06:01

## 2025-01-30 RX ADMIN — Medication 200 MG: at 09:01

## 2025-01-30 RX ADMIN — CYANOCOBALAMIN TAB 1000 MCG 1000 MCG: 1000 TAB at 09:01

## 2025-01-30 RX ADMIN — THERA TABS 1 TABLET: TAB at 09:01

## 2025-01-30 RX ADMIN — SODIUM CHLORIDE, POTASSIUM CHLORIDE, SODIUM LACTATE AND CALCIUM CHLORIDE: 600; 310; 30; 20 INJECTION, SOLUTION INTRAVENOUS at 09:01

## 2025-01-30 RX ADMIN — Medication 3 MG: at 08:01

## 2025-01-30 RX ADMIN — FOLIC ACID 1 MG: 1 TABLET ORAL at 09:01

## 2025-01-30 RX ADMIN — AZITHROMYCIN DIHYDRATE 500 MG: 250 TABLET ORAL at 09:01

## 2025-01-30 RX ADMIN — MIRTAZAPINE 7.5 MG: 7.5 TABLET, FILM COATED ORAL at 08:01

## 2025-01-30 RX ADMIN — NICOTINE 1 PATCH: 21 PATCH, EXTENDED RELEASE TRANSDERMAL at 09:01

## 2025-01-30 RX ADMIN — OLANZAPINE 5 MG: 10 INJECTION, POWDER, FOR SOLUTION INTRAMUSCULAR at 04:01

## 2025-01-30 RX ADMIN — SODIUM CHLORIDE, POTASSIUM CHLORIDE, SODIUM LACTATE AND CALCIUM CHLORIDE: 600; 310; 30; 20 INJECTION, SOLUTION INTRAVENOUS at 04:01

## 2025-01-30 NOTE — NURSING
Pt agitated;cursing at staff and getting out of bed. Pt redirected and educated on risk of fall. Pt refused to get back in bed. Shannon notified and ordered Zyprexa 5 mg . Bed locked, bed alarm on, bed  in low position, and call light within reach.

## 2025-01-30 NOTE — PT/OT/SLP PROGRESS
Occupational Therapy      Patient Name:  Solomon Terrazas   MRN:  [id number]    Patient not seen today secondary to PM 1420: Patient unwilling to participate. Pt resistive to all attempts at therapy. He is not appropriate at this time for OT services. Nurse notified. Will follow-up.     1/30/2025

## 2025-01-30 NOTE — PT/OT/SLP PROGRESS
"Speech Language Pathology Treatment    Patient Name:  Solomon Terrazas   MRN:  [id number]  Admitting Diagnosis: Syncope and collapse    Recommendations:                 Diet recommendations:  Soft & Bite Sized Diet - IDDSI Level 6, Thin liquids - IDDSI Level 0   Aspiration Precautions: pt needs assistance and guidance with eating meals, slow rate, alternate sips and bites, provide verbal instructions to him, crush meds    General Precautions: Standard, fall  Communication strategies:  [place]    Assessment:     Solomon Terrazas is a 74 y.o. male admitted with syncope and collapse, pt may continue on current diet level but needs assistance with feeding.     Subjective     Pt seen in room, pt had pulled out IV and was somewhat irritable.   Patient goals: "to get out of here."     Pain/Comfort:  Pain Rating 1: 0/10    Respiratory Status: room air     Objective:     Has the patient been evaluated by SLP for swallowing?   Yes  Keep patient NPO? No       Swallowing: Pt found in room, pt required redirection and verbal cues to participate in PO trials. Pt did accept some trials of water with no coughing or choking. Pt needed verbal encouragement to take bites of diced peaches when fed by SLP. Pt with fair tolerance and adequate mastication. Pt with increased restless behavior in the bed and soon began protesting no further trials. Pt did request to be left alone and fixated with leaving.     Goals:   Multidisciplinary Problems       SLP Goals       Not on file                    Plan:     Patient to be seen:      Plan of Care expires:     Plan of Care reviewed with:  patient   SLP Follow-Up:  No       Discharge recommendations:  Moderate Intensity Therapy   Barriers to Discharge:  none    Time Tracking:     SLP Treatment Date:   01/30/25  Speech Start Time:  1001  Speech Stop Time:  1011     Speech Total Time (min):  10 min    Billable Minutes: Treatment Swallowing Dysfunction 10    01/30/2025    "

## 2025-01-30 NOTE — PLAN OF CARE
CELY spoke with pt's brother Bozena [phone] to discuss dc planning. CELY gave Bozena the number for [facility] and requested that he call and fill out paperwork for dc tomorrow. CELY informed family that pt will dc tomorrow and that paperwork will need to be finished today. Cm will continue to follow pt through transitions of care and assist with any discharge needs.    11:42am CELY spoke with pt's brother, he stated that he will be going to [facility] to sign paperwork today at 1pm.    SNOW Porras  [phone]     01/30/25 1021   Post-Acute Status   Post-Acute Authorization Placement   Coverage PHN   Discharge Delays None known at this time   Discharge Plan   Discharge Plan A Skilled Nursing Facility

## 2025-01-30 NOTE — PROGRESS NOTES
[facility] Medicine Progress Note    Primary Team: [facility] Hospitalist Team A  Attending Physician: Stephanie Adhikari MD  Resident: Pedro Kennedy  Intern: Diane Whalen    Subjective/Interval History      Agitated overnight. Pulled out IV. Received one dose of zyprexa. Doing better this morning. No complaints. Afebrile overnight.     Objective   Last 24 Hour Vital Signs:  BP  Min: 80/51  Max: 167/80  Temp  Av.2 °F (36.8 °C)  Min: 97.6 °F (36.4 °C)  Max: 98.7 °F (37.1 °C)  Pulse  Av.5  Min: 67  Max: 86  Resp  Av.6  Min: 16  Max: 18  SpO2  Av.6 %  Min: 93 %  Max: 100 %  I/O last 3 completed shifts:  In: -   Out: 1400 [Urine:1400]    Physical Examination:  General: No acute distress.  Head: normocephalic, atraumatic  Eyes: PERRL. EOMI. No conjunctival injection. No scleral icterus.  ENT: MMM, no rhinorrhea or epistaxis.   Neck: No lymphadenopathy. No accessory muscle use.   Cardiac: Regular rate. Regular rhythm. No murmurs appreciated.  Pulmonary/Chest: CTAB. Normal work of breathing.   Abdomen: Soft, non tender, non distended, normoactive bowel sounds.   Extremities: atraumatic, no deformities, no edema.  Skin: dry, warm, intact. No bruising or rashes.  Neuro: Disoriented. Moving all extremities spontaneously.    Laboratory and Microbiology:  I have independently reviewed data.    Imaging and EKGs:  I have independently reviewed data.     Assessment & Plan   Solomon Terrazas is a 74 y.o. male with a PMHx of Tobacco Use disorder and Alcohol Use disorder. The patient presented on 2025 for REDD and Presyncope who was admitted for IV hydration and further workup all of which is resolved. Patient is now consistently disoriented and agitation requiring IM medications to keep patient and staff safe at time. His REDD reemerged and nephrology was consulted for evaluation of possible RTA I the setting of hyperK.      Fever of unknown origin- resolved  RLL opacity  -  febrile to 102, WBC increased from  4 to 11   - BCX NGTD, UA with 15 WBCs, urine cx pending  - chest x-ray done w/ possible increased opacity on RLL- suspect aspiration pneumonitis  - CTX/Azithro  - DVT US negative bilaterally       Encephalopathy  Alcohol use disorder  Hyperactive/Hypoactive delirium  - unclear baseline mental status, attempting to contact family  - wax/waning; consideration for wernicke's encephalopathy given reported alcohol use history vs delirium; appears improving  - CT head w/ atrophic changes; electrolytes stable  - s/p gabapentin taper, is now on 300 mg BID  - increased sleep during day; is on delirium precautions, concern for hyperactive-->hypoactive delirium  - delirium precautions in place now  - Dr. Goetz recommend Remeron 7.5 mg nightly, continue vit supplementation, and able to use Zyprexa 5 mg PO/IM q6h PRN for agitation  - Per Dr. Goetz's examine with patient, he does not have medical decision- making capacity  - patient's family amenable to SNF  - Thiamine repletion IV , MVI, Folic acid      Presyncope vs Syncope   - Unclear story; patient's story does not reveal any true LOC  - Suspect vasovagal etiology potentially related to dehydration; however unclear  - May be due to alcohol intoxication as patient has history of significant alcohol use  - CT Head negative for acute abnormalities   - TTE and w/o valvulopathy or HF  - Continue to monitor on telemetry  - PT/OT eval: recommending SNF     Stage 1 Acute Kidney Injury  Metabolic acidosis  - Creatinine of 1.7 on admission; appears baseline is 1.3  -likely 2/2 to pre-renal etiology- improved with IVF  - bicarb persistently low, with infrequent mild hyperK and hyperchloremia; urine anion gap of 79 which may be c/w type 4 RTA. Consideration of heparin induced etiology during hospitalization. UA not collected, urine pH on admit only 6.0.   - c/s nephrology regarding c/f RTA              - rec Renal US              - lower c/f RTA since hyperK not persistent               -  rec isotonic fluids  - sCr on 1/27 improving close to baseline 1.4  - REDD worsened 1/29, CPK elevated to 300, restarted IVFs    Tobacco Use Disorder  - patient on smoking cessation  -nicotine patches while inpatient.      Normocytic Anemia  Hypovitaminosis B12  - stable, ferritin elevated w/ low iron sat; consider mixed picture w/ nutritional deficiencies vs chronic inflammation  - repleting B12 IM, start PO supplementation     Hyperkalemia; resolved  -mild at 5.2 w/o evidence of EKG changes  -monitoring w/ daily CMP     Healthcare maintenance   -primary care provider: None                Diet: soft diet   VTE PPx: Heparin, renally dosed  Code Status: Full Code     Dispo: Pending clinical improvement vs placement, PT/OT rec JUAN  Estimated LOS: >72 hrs.   Case will be discussed with attending physician and attestation to follow, please appreciate.     Pedro Kennedy MD  U Internal Medicine Resident, PGY-3  U Hospitalist Team A    [facility] Medicine Hospitalist Pager numbers:   [facility] Hospitalist Medicine Team A (Danelle/Zeynep): [phone]  [facility] Hospitalist Medicine Team B (Madan/Kenton):  [phone]

## 2025-01-30 NOTE — PLAN OF CARE
Medicare Message     Important Message from Medicare regarding Discharge Appeal Rights Other (comments)Important Message from Medicare regarding Discharge Appeal Rights. Other (comments). The comment is Patient unable to sign due to medical condition. Taken on 1/28/25 1015 Explained to patient/caregiver; Other (comments)Important Message from Medicare regarding Discharge Appeal Rights. Explained to patient/caregiver; Other (comments). The comment is Patient gave verbal consent. Taken on 1/30/25 1523   Date IMM was signed 1/27/2025 1/30/2025   Time IMM was signed [phone]

## 2025-01-31 VITALS
OXYGEN SATURATION: 100 % | SYSTOLIC BLOOD PRESSURE: 128 MMHG | WEIGHT: 127 LBS | HEART RATE: 61 BPM | RESPIRATION RATE: 18 BRPM | TEMPERATURE: 98 F | BODY MASS INDEX: 19.25 KG/M2 | DIASTOLIC BLOOD PRESSURE: 68 MMHG | HEIGHT: 68 IN

## 2025-01-31 PROBLEM — R41.0 ACUTE DELIRIUM: Status: RESOLVED | Noted: 2025-01-27 | Resolved: 2025-01-31

## 2025-01-31 PROBLEM — R50.9 FEVER: Status: RESOLVED | Noted: 2025-01-29 | Resolved: 2025-01-31

## 2025-01-31 PROBLEM — R42 POSTURAL DIZZINESS WITH PRESYNCOPE: Status: RESOLVED | Noted: 2025-01-21 | Resolved: 2025-01-31

## 2025-01-31 PROBLEM — N17.9 AKI (ACUTE KIDNEY INJURY): Status: RESOLVED | Noted: 2021-09-06 | Resolved: 2025-01-31

## 2025-01-31 PROBLEM — M62.82 NON-TRAUMATIC RHABDOMYOLYSIS: Status: ACTIVE | Noted: 2025-01-31

## 2025-01-31 PROBLEM — R55 POSTURAL DIZZINESS WITH PRESYNCOPE: Status: RESOLVED | Noted: 2025-01-21 | Resolved: 2025-01-31

## 2025-01-31 PROBLEM — J69.0 ASPIRATION PNEUMONIA OF RIGHT LOWER LOBE: Status: RESOLVED | Noted: 2025-01-29 | Resolved: 2025-01-31

## 2025-01-31 PROBLEM — E87.29 INCREASED ANION GAP METABOLIC ACIDOSIS: Status: RESOLVED | Noted: 2025-01-21 | Resolved: 2025-01-31

## 2025-01-31 PROBLEM — E80.6 BILIRUBINEMIA: Status: RESOLVED | Noted: 2021-09-06 | Resolved: 2025-01-31

## 2025-01-31 PROBLEM — F32.A DEPRESSION: Chronic | Status: ACTIVE | Noted: 2025-01-31

## 2025-01-31 PROBLEM — G47.00 INSOMNIA: Chronic | Status: ACTIVE | Noted: 2025-01-31

## 2025-01-31 PROBLEM — R55 SYNCOPE AND COLLAPSE: Status: RESOLVED | Noted: 2025-01-21 | Resolved: 2025-01-31

## 2025-01-31 PROBLEM — E53.8 B12 DEFICIENCY: Status: ACTIVE | Noted: 2025-01-31

## 2025-01-31 PROBLEM — E87.5 HYPERKALEMIA: Status: RESOLVED | Noted: 2025-01-21 | Resolved: 2025-01-31

## 2025-01-31 PROBLEM — N39.0 URINARY TRACT INFECTION: Status: ACTIVE | Noted: 2025-01-31

## 2025-01-31 LAB
ALBUMIN SERPL BCP-MCNC: 2.9 G/DL (ref 3.5–5.2)
ALP SERPL-CCNC: 62 U/L (ref 40–150)
ALT SERPL W/O P-5'-P-CCNC: 30 U/L (ref 10–44)
ANION GAP SERPL CALC-SCNC: 6 MMOL/L (ref 8–16)
AST SERPL-CCNC: 66 U/L (ref 10–40)
BACTERIA UR CULT: ABNORMAL
BASOPHILS # BLD AUTO: 0.02 K/UL (ref 0–0.2)
BASOPHILS NFR BLD: 0.3 % (ref 0–1.9)
BILIRUB SERPL-MCNC: 0.7 MG/DL (ref 0.1–1)
BUN SERPL-MCNC: 28 MG/DL (ref 8–23)
CALCIUM SERPL-MCNC: 9.9 MG/DL (ref 8.7–10.5)
CHLORIDE SERPL-SCNC: 109 MMOL/L (ref 95–110)
CK SERPL-CCNC: 1435 U/L (ref 20–200)
CO2 SERPL-SCNC: 25 MMOL/L (ref 23–29)
CREAT SERPL-MCNC: 1.3 MG/DL (ref 0.5–1.4)
DIFFERENTIAL METHOD BLD: ABNORMAL
EOSINOPHIL # BLD AUTO: 0 K/UL (ref 0–0.5)
EOSINOPHIL NFR BLD: 0.7 % (ref 0–8)
ERYTHROCYTE [DISTWIDTH] IN BLOOD BY AUTOMATED COUNT: 12.1 % (ref 11.5–14.5)
EST. GFR  (NO RACE VARIABLE): 57 ML/MIN/1.73 M^2
GLUCOSE SERPL-MCNC: 96 MG/DL (ref 70–110)
HCT VFR BLD AUTO: 32.1 % (ref 40–54)
HGB BLD-MCNC: 10 G/DL (ref 14–18)
IMM GRANULOCYTES # BLD AUTO: 0.02 K/UL (ref 0–0.04)
IMM GRANULOCYTES NFR BLD AUTO: 0.3 % (ref 0–0.5)
LYMPHOCYTES # BLD AUTO: 1 K/UL (ref 1–4.8)
LYMPHOCYTES NFR BLD: 16.9 % (ref 18–48)
MAGNESIUM SERPL-MCNC: 2.1 MG/DL (ref 1.6–2.6)
MCH RBC QN AUTO: 29 PG (ref 27–31)
MCHC RBC AUTO-ENTMCNC: 31.2 G/DL (ref 32–36)
MCV RBC AUTO: 93 FL (ref 82–98)
MONOCYTES # BLD AUTO: 0.6 K/UL (ref 0.3–1)
MONOCYTES NFR BLD: 10.8 % (ref 4–15)
NEUTROPHILS # BLD AUTO: 4.2 K/UL (ref 1.8–7.7)
NEUTROPHILS NFR BLD: 71 % (ref 38–73)
NRBC BLD-RTO: 0 /100 WBC
PHOSPHATE SERPL-MCNC: 3.8 MG/DL (ref 2.7–4.5)
PLATELET # BLD AUTO: 302 K/UL (ref 150–450)
PMV BLD AUTO: 10.9 FL (ref 9.2–12.9)
POTASSIUM SERPL-SCNC: 5.1 MMOL/L (ref 3.5–5.1)
PROT SERPL-MCNC: 6.5 G/DL (ref 6–8.4)
RBC # BLD AUTO: 3.45 M/UL (ref 4.6–6.2)
SARS-COV-2 RDRP RESP QL NAA+PROBE: NEGATIVE
SODIUM SERPL-SCNC: 140 MMOL/L (ref 136–145)
WBC # BLD AUTO: 5.85 K/UL (ref 3.9–12.7)

## 2025-01-31 PROCEDURE — 87635 SARS-COV-2 COVID-19 AMP PRB: CPT | Performed by: INTERNAL MEDICINE

## 2025-01-31 PROCEDURE — 63700000 PHARM REV CODE 250 ALT 637 W/O HCPCS

## 2025-01-31 PROCEDURE — 85025 COMPLETE CBC W/AUTO DIFF WBC: CPT

## 2025-01-31 PROCEDURE — 84100 ASSAY OF PHOSPHORUS: CPT

## 2025-01-31 PROCEDURE — 80053 COMPREHEN METABOLIC PANEL: CPT

## 2025-01-31 PROCEDURE — 83735 ASSAY OF MAGNESIUM: CPT

## 2025-01-31 PROCEDURE — 63600175 PHARM REV CODE 636 W HCPCS: Performed by: STUDENT IN AN ORGANIZED HEALTH CARE EDUCATION/TRAINING PROGRAM

## 2025-01-31 PROCEDURE — 25000003 PHARM REV CODE 250: Performed by: STUDENT IN AN ORGANIZED HEALTH CARE EDUCATION/TRAINING PROGRAM

## 2025-01-31 PROCEDURE — 25000003 PHARM REV CODE 250: Performed by: PSYCHIATRY & NEUROLOGY

## 2025-01-31 PROCEDURE — 63600175 PHARM REV CODE 636 W HCPCS

## 2025-01-31 PROCEDURE — 36415 COLL VENOUS BLD VENIPUNCTURE: CPT

## 2025-01-31 PROCEDURE — 25000003 PHARM REV CODE 250

## 2025-01-31 PROCEDURE — 82550 ASSAY OF CK (CPK): CPT

## 2025-01-31 PROCEDURE — S4991 NICOTINE PATCH NONLEGEND: HCPCS

## 2025-01-31 RX ORDER — MIRTAZAPINE 7.5 MG/1
7.5 TABLET, FILM COATED ORAL NIGHTLY
Qty: 30 TABLET | Refills: 11 | Status: SHIPPED | OUTPATIENT
Start: 2025-01-31 | End: 2025-01-31

## 2025-01-31 RX ORDER — SODIUM CHLORIDE, SODIUM LACTATE, POTASSIUM CHLORIDE, CALCIUM CHLORIDE 600; 310; 30; 20 MG/100ML; MG/100ML; MG/100ML; MG/100ML
INJECTION, SOLUTION INTRAVENOUS CONTINUOUS
Status: DISCONTINUED | OUTPATIENT
Start: 2025-01-31 | End: 2025-01-31 | Stop reason: HOSPADM

## 2025-01-31 RX ORDER — ARIPIPRAZOLE 2 MG/1
2 TABLET ORAL DAILY
Qty: 30 TABLET | Refills: 11 | Status: SHIPPED | OUTPATIENT
Start: 2025-01-31 | End: 2026-01-31

## 2025-01-31 RX ORDER — TALC
3 POWDER (GRAM) TOPICAL NIGHTLY
Qty: 30 TABLET | Refills: 3 | Status: SHIPPED | OUTPATIENT
Start: 2025-01-31 | End: 2025-01-31

## 2025-01-31 RX ORDER — CEPHALEXIN 500 MG/1
500 CAPSULE ORAL EVERY 12 HOURS
Qty: 8 CAPSULE | Refills: 0 | Status: SHIPPED | OUTPATIENT
Start: 2025-01-31 | End: 2025-02-04

## 2025-01-31 RX ORDER — LANOLIN ALCOHOL/MO/W.PET/CERES
1000 CREAM (GRAM) TOPICAL DAILY
Qty: 30 TABLET | Refills: 2 | Status: SHIPPED | OUTPATIENT
Start: 2025-01-31 | End: 2025-01-31

## 2025-01-31 RX ORDER — CEPHALEXIN 500 MG/1
500 CAPSULE ORAL EVERY 12 HOURS
Qty: 8 CAPSULE | Refills: 0 | Status: SHIPPED | OUTPATIENT
Start: 2025-01-31 | End: 2025-01-31

## 2025-01-31 RX ORDER — LANOLIN ALCOHOL/MO/W.PET/CERES
1000 CREAM (GRAM) TOPICAL DAILY
Qty: 30 TABLET | Refills: 2 | Status: SHIPPED | OUTPATIENT
Start: 2025-01-31 | End: 2025-05-01

## 2025-01-31 RX ORDER — IBUPROFEN 200 MG
1 TABLET ORAL DAILY
Qty: 28 PATCH | Refills: 2 | Status: SHIPPED | OUTPATIENT
Start: 2025-01-31 | End: 2025-05-31

## 2025-01-31 RX ORDER — ARIPIPRAZOLE 2 MG/1
2 TABLET ORAL DAILY
Qty: 30 TABLET | Refills: 11 | Status: SHIPPED | OUTPATIENT
Start: 2025-01-31 | End: 2025-01-31

## 2025-01-31 RX ORDER — OLANZAPINE 10 MG/2ML
5 INJECTION, POWDER, FOR SOLUTION INTRAMUSCULAR ONCE
Status: COMPLETED | OUTPATIENT
Start: 2025-01-31 | End: 2025-01-31

## 2025-01-31 RX ORDER — IBUPROFEN 200 MG
1 TABLET ORAL DAILY
Qty: 28 PATCH | Refills: 2 | Status: SHIPPED | OUTPATIENT
Start: 2025-01-31 | End: 2025-01-31

## 2025-01-31 RX ORDER — TALC
3 POWDER (GRAM) TOPICAL NIGHTLY
Qty: 30 TABLET | Refills: 3 | Status: SHIPPED | OUTPATIENT
Start: 2025-01-31

## 2025-01-31 RX ORDER — MIRTAZAPINE 7.5 MG/1
7.5 TABLET, FILM COATED ORAL NIGHTLY
Qty: 30 TABLET | Refills: 11 | Status: SHIPPED | OUTPATIENT
Start: 2025-01-31 | End: 2026-01-31

## 2025-01-31 RX ADMIN — ARIPIPRAZOLE 2 MG: 2 TABLET ORAL at 11:01

## 2025-01-31 RX ADMIN — SODIUM CHLORIDE, POTASSIUM CHLORIDE, SODIUM LACTATE AND CALCIUM CHLORIDE: 600; 310; 30; 20 INJECTION, SOLUTION INTRAVENOUS at 11:01

## 2025-01-31 RX ADMIN — NICOTINE 1 PATCH: 21 PATCH, EXTENDED RELEASE TRANSDERMAL at 11:01

## 2025-01-31 RX ADMIN — CYANOCOBALAMIN TAB 1000 MCG 1000 MCG: 1000 TAB at 11:01

## 2025-01-31 RX ADMIN — OLANZAPINE 5 MG: 10 INJECTION, POWDER, FOR SOLUTION INTRAMUSCULAR at 01:01

## 2025-01-31 RX ADMIN — AZITHROMYCIN DIHYDRATE 500 MG: 250 TABLET ORAL at 11:01

## 2025-01-31 RX ADMIN — POLYETHYLENE GLYCOL 3350 17 G: 17 POWDER, FOR SOLUTION ORAL at 11:01

## 2025-01-31 RX ADMIN — CEFTRIAXONE SODIUM 1 G: 1 INJECTION, POWDER, FOR SOLUTION INTRAMUSCULAR; INTRAVENOUS at 11:01

## 2025-01-31 RX ADMIN — FOLIC ACID 1 MG: 1 TABLET ORAL at 11:01

## 2025-01-31 RX ADMIN — THERA TABS 1 TABLET: TAB at 11:01

## 2025-01-31 RX ADMIN — SODIUM CHLORIDE, POTASSIUM CHLORIDE, SODIUM LACTATE AND CALCIUM CHLORIDE: 600; 310; 30; 20 INJECTION, SOLUTION INTRAVENOUS at 06:01

## 2025-01-31 NOTE — PLAN OF CARE
Problem: Adult Inpatient Plan of Care  Goal: Plan of Care Review  Outcome: Progressing  Goal: Readiness for Transition of Care  Outcome: Progressing     Problem: Fall Injury Risk  Goal: Absence of Fall and Fall-Related Injury  Outcome: Progressing     Problem: Confusion Acute  Goal: Optimal Cognitive Function  Outcome: Not Progressing

## 2025-01-31 NOTE — PLAN OF CARE
MARIE met spoke with [facility] nursing to discuss pts admit to their facility as SNF this AM. Per intake pt's brother completed paperwork yesterday. MARIE sent orders and requested information via email to their admissions coordinator. MARIE called and left a VM for Sandi at [facility] requesting that auth be sent to [facility]. MARIE will follow.    12:10pm marie spoke with NH they are requesting dx on all meds. MARIE will follow.    1:54PM marie spoke with pt's brother Bozena to complete final dc assessment. Pt will d.c to [facility] SNF today. Pt will dc via [employer]. Report can be called to [phone] pt will go to room 176A. MARIE requested [facility] van transport for 3pm. Rounds completed on pt. All questions addressed. Bedside nurse to discuss d/c medications. Discussed importance to attend all f/u appts and take medications as prescribed. Verbalized understanding. Case Management to sign off.     SNOW [facility]  [phone]     01/31/25 1130   Final Note   Assessment Type Final Discharge Note   Anticipated Discharge Disposition SNF   What phone number can be called within the next 1-3 days to see how you are doing after discharge? [phone]   Post-Acute Status   Post-Acute Placement Status Set-up Complete/Auth obtained   Coverage PHN   Discharge Delays None known at this time

## 2025-01-31 NOTE — PLAN OF CARE
VIRTUAL NURSE:  Labs, notes, orders, and careplan reviewed.   VN to be available as needed.       Problem: Adult Inpatient Plan of Care  Goal: Plan of Care Review  1/31/2025 0236 by Alanna Herzog RN  Outcome: Progressing     Problem: Adult Inpatient Plan of Care  Goal: Patient-Specific Goal (Individualized)  Outcome: Progressing
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
## 2025-01-31 NOTE — NURSING
0115 Pt confused, agitated, got out of bed and was trying to escape. Security and charge nurse at bedside. Reorientation provided but pt showed no signs of understanding. MD notified. Inj. Olanzapine 5 mg IV given at 0145. Will continue to monitor.

## 2025-01-31 NOTE — PLAN OF CARE
[facility]    FACILITY TRANSFER ORDERS      Patient Name: Solomon Terrazas  YOB: 1950    PCP: No, Primary Doctor   PCP Address: None  PCP Phone Number: None  PCP Fax: None    Encounter Date: 01/31/2025    Admit to: [facility]    Vital Signs:  Routine    Diagnoses:   Active Hospital Problems    Diagnosis  POA    Non-traumatic rhabdomyolysis [M62.82]  Yes    Urinary tract infection [N39.0]  Unknown    B12 deficiency [E53.8]  Unknown    Depression [F32.A]  Yes     Chronic    Insomnia [G47.00]  Yes     Chronic    Dementia with behavioral disturbance [F03.918]  Yes    Onychogryphosis [L60.2]  Yes    Alcohol use disorder [F10.90]  Yes     Chronic    Tobacco use disorder [F17.200]  Yes     Chronic      Resolved Hospital Problems    Diagnosis Date Resolved POA    *Syncope and collapse [R55] 01/31/2025 Yes    Fever [R50.9] 01/31/2025 Yes    Aspiration pneumonia of right lower lobe [J69.0] 01/31/2025 Yes    Acute delirium [R41.0] 01/31/2025 Yes    Postural dizziness with presyncope [R42, R55] 01/31/2025 Yes    Hyperkalemia [E87.5] 01/31/2025 Yes    Increased anion gap metabolic acidosis [E87.29] 01/31/2025 Yes    REDD (acute kidney injury) [N17.9] 01/31/2025 Yes    Bilirubinemia [E80.6] 01/31/2025 Yes       Allergies:Review of patient's allergies indicates:  No Known Allergies    Diet: soft diet    Activities: Activity as tolerated    Goals of Care Treatment Preferences:  Code Status: Full Code    CONSULTS:    Physical Therapy to evaluate and treat.     Medications: Review discharge medications with patient and family and provide education.         Medication List        START taking these medications      ARIPiprazole 2 MG Tab  Commonly known as: ABILIFY  Take 1 tablet (2 mg total) by mouth once daily.     cephALEXin 500 MG capsule  Commonly known as: KEFLEX  Take 1 capsule (500 mg total) by mouth every 12 (twelve) hours. for 4 days     cyanocobalamin 1000 MCG tablet  Commonly known as: VITAMIN  B-12  Take 1 tablet (1,000 mcg total) by mouth once daily.     melatonin 3 mg tablet  Commonly known as: MELATIN  Take 1 tablet (3 mg total) by mouth nightly.     mirtazapine 7.5 MG Tab  Commonly known as: REMERON  Take 1 tablet (7.5 mg total) by mouth every evening.     multivitamin Tab  Take 1 tablet by mouth once daily.     nicotine 21 mg/24 hr  Commonly known as: NICODERM CQ  Place 1 patch onto the skin once daily.            STOP taking these medications      methocarbamoL 500 MG Tab  Commonly known as: ROBAXIN     oxyCODONE-acetaminophen 5-325 mg per tablet  Commonly known as: PERCOCET            Immunizations Administered as of 1/31/2025       No immunizations on file.            Some patients may experience side effects after vaccination.  These may include fever, headache, muscle or joint aches.  Most symptoms resolve with 24-48 hours and do not require urgent medical evaluation unless they persist for more than 72 hours or symptoms are concerning for an unrelated medical condition.        _________________________________  Papi Kennedy MD  01/31/2025

## 2025-01-31 NOTE — PT/OT/SLP PROGRESS
"Occupational Therapy      Patient Name:  Solomon Terrazas   MRN:  [id number]    Patient not seen today secondary to AM 1159: Patient unwilling to participate. Attempted therapy however pt unwilling to participate with max encouragement. Pt pulling away when attempting to roll pt on to side and move to EOB therefore discontinued.  Pt states " I just want to be alone today and sleep."  Nurse notified.    1/31/2025  "
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
## 2025-01-31 NOTE — PROGRESS NOTES
[facility] Medicine Progress Note    Primary Team: [facility] Hospitalist Team A  Attending Physician: Stephanie Adhikari MD  Resident: Pedro Kennedy  Intern: Diane Whalen    Subjective/Interval History      Agitated overnight. Given 1 time dose of Zyprexa overnight. Patient sleeping comfortably this morning with non labored breathing.      Objective   Last 24 Hour Vital Signs:  BP  Min: 136/66  Max: 174/93  Temp  Av.5 °F (36.9 °C)  Min: 97.6 °F (36.4 °C)  Max: 99.6 °F (37.6 °C)  Pulse  Av.2  Min: 70  Max: 94  Resp  Av.3  Min: 16  Max: 18  SpO2  Av %  Min: 93 %  Max: 98 %  I/O last 3 completed shifts:  In: -   Out: 900 [Urine:900]    Physical Examination:  General: No acute distress.  Head: normocephalic, atraumatic  Eyes: PERRL. EOMI. No conjunctival injection. No scleral icterus.  ENT: MMM, no rhinorrhea or epistaxis.   Neck: No lymphadenopathy. No accessory muscle use.   Cardiac: Regular rate. Regular rhythm. No murmurs appreciated.  Pulmonary/Chest: CTAB. Normal work of breathing.   Abdomen: Soft, non tender, non distended, normoactive bowel sounds.   Extremities: atraumatic, no deformities, no edema.  Skin: dry, warm, intact. No bruising or rashes.  Neuro: Disoriented. Moving all extremities spontaneously.    Laboratory and Microbiology:  I have independently reviewed data.    Imaging and EKGs:  I have independently reviewed data.     Assessment & Plan   Solomon Terrazas is a 74 y.o. male with a PMHx of Tobacco Use disorder and Alcohol Use disorder. The patient presented on 2025 for REDD and Presyncope who was admitted for IV hydration and further workup all of which is resolved. Patient is now consistently disoriented and agitation requiring IM medications to keep patient and staff safe at time. His REDD reemerged and nephrology was consulted for evaluation of possible RTA I the setting of hyperK.      Fever of unknown origin- resolved  RLL opacity  UTI with Coag neg Stap  -  febrile to  102, WBC increased from 4 to 11   - BCX NGTD, UA with 15 WBCs,  - chest x-ray done w/ possible increased opacity on RLL- suspect aspiration pneumonitis  - CTX/Azithro  - DVT US negative bilaterally    - UCX with >100K CFU of Coag Neg Staph      Encephalopathy  Alcohol use disorder  Hyperactive/Hypoactive delirium  - unclear baseline mental status, attempting to contact family  - wax/waning; consideration for wernicke's encephalopathy given reported alcohol use history vs delirium; appears improving  - CT head w/ atrophic changes; electrolytes stable  - s/p gabapentin taper, is now on 300 mg BID  - increased sleep during day; is on delirium precautions, concern for hyperactive-->hypoactive delirium  - delirium precautions in place now  - Dr. Goetz recommend Remeron 7.5 mg nightly, continue vit supplementation, and able to use Zyprexa 5 mg PO/IM q6h PRN for agitation  - Per Dr. Goetz's examine with patient, he does not have medical decision- making capacity  - patient's family amenable to SNF  - Thiamine repletion IV , MVI, Folic acid   - Started Abilify 2 mg daily      Presyncope vs Syncope   - Unclear story; patient's story does not reveal any true LOC  - Suspect vasovagal etiology potentially related to dehydration; however unclear  - May be due to alcohol intoxication as patient has history of significant alcohol use  - CT Head negative for acute abnormalities   - TTE and w/o valvulopathy or HF  - Continue to monitor on telemetry  - PT/OT eval: recommending SNF     Stage 1 Acute Kidney Injury  Metabolic acidosis  Rhabdomyolysis   - Creatinine of 1.7 on admission; appears baseline is 1.3  -likely 2/2 to pre-renal etiology- improved with IVF  - bicarb persistently low, with infrequent mild hyperK and hyperchloremia; urine anion gap of 79 which may be c/w type 4 RTA. Consideration of heparin induced etiology during hospitalization. UA not collected, urine pH on admit only 6.0.   - c/s nephrology regarding c/f RTA               - rec Renal US              - lower c/f RTA since hyperK not persistent               - rec isotonic fluids  - sCr on 1/27 improving close to baseline 1.4  - REDD worsened 1/29, CPK elevated to 300, restarted IVFs, but patient ripped his IV out  - CK continued to elevate to 2800, will repeat this morning and try IVFs again     Tobacco Use Disorder  - patient on smoking cessation  -nicotine patches while inpatient.      Normocytic Anemia  Hypovitaminosis B12  - stable, ferritin elevated w/ low iron sat; consider mixed picture w/ nutritional deficiencies vs chronic inflammation  - repleting B12 IM, start PO supplementation     Hyperkalemia; resolved  -mild at 5.2 w/o evidence of EKG changes  -monitoring w/ daily CMP     Healthcare maintenance   -primary care provider: None                Diet: soft diet   VTE PPx: Heparin, renally dosed  Code Status: Full Code     Dispo: Working on SNF placement   Estimated LOS: >72 hrs.   Case will be discussed with attending physician and attestation to follow, please appreciate.     Diane Lew MD  [facility] Resident, PGY-1  [facility] Hospitalist Team A    [facility] Medicine Hospitalist Pager numbers:   [facility] Hospitalist Medicine Team A (Danelle/Zeynep): [phone]  [facility] Hospitalist Medicine Team B (Madan/Kenton):  [phone]

## 2025-02-03 LAB
BACTERIA BLD CULT: NORMAL
BACTERIA BLD CULT: NORMAL

## 2025-08-29 ENCOUNTER — HOSPITAL ENCOUNTER (EMERGENCY)
Facility: HOSPITAL | Age: 75
Discharge: NURSING, REHAB OR LONG-TERM CARE | End: 2025-08-30
Attending: EMERGENCY MEDICINE
Payer: MEDICARE

## 2025-08-29 DIAGNOSIS — R41.82 AMS (ALTERED MENTAL STATUS): ICD-10-CM

## 2025-08-29 DIAGNOSIS — F03.90 DEMENTIA, UNSPECIFIED DEMENTIA SEVERITY, UNSPECIFIED DEMENTIA TYPE, UNSPECIFIED WHETHER BEHAVIORAL, PSYCHOTIC, OR MOOD DISTURBANCE OR ANXIETY: Primary | ICD-10-CM

## 2025-08-29 LAB
ABSOLUTE EOSINOPHIL (OHS): 0.09 K/UL
ABSOLUTE MONOCYTE (OHS): 0.43 K/UL (ref 0.3–1)
ABSOLUTE NEUTROPHIL COUNT (OHS): 2.12 K/UL (ref 1.8–7.7)
ALBUMIN SERPL BCP-MCNC: 3.5 G/DL (ref 3.5–5.2)
ALP SERPL-CCNC: 136 UNIT/L (ref 40–150)
ALT SERPL W/O P-5'-P-CCNC: 8 UNIT/L (ref 10–44)
ANION GAP (OHS): 8 MMOL/L (ref 8–16)
AST SERPL-CCNC: 24 UNIT/L (ref 11–45)
BASOPHILS # BLD AUTO: 0.02 K/UL
BASOPHILS NFR BLD AUTO: 0.5 %
BILIRUB SERPL-MCNC: 0.4 MG/DL (ref 0.1–1)
BILIRUB UR QL STRIP.AUTO: NEGATIVE
BUN SERPL-MCNC: 31 MG/DL (ref 8–23)
CALCIUM SERPL-MCNC: 9.2 MG/DL (ref 8.7–10.5)
CHLORIDE SERPL-SCNC: 108 MMOL/L (ref 95–110)
CLARITY UR: CLEAR
CO2 SERPL-SCNC: 22 MMOL/L (ref 23–29)
COLOR UR AUTO: YELLOW
CREAT SERPL-MCNC: 1.1 MG/DL (ref 0.5–1.4)
CTP QC/QA: YES
CTP QC/QA: YES
ERYTHROCYTE [DISTWIDTH] IN BLOOD BY AUTOMATED COUNT: 14.6 % (ref 11.5–14.5)
GFR SERPLBLD CREATININE-BSD FMLA CKD-EPI: >60 ML/MIN/1.73/M2
GLUCOSE SERPL-MCNC: 93 MG/DL (ref 70–110)
GLUCOSE UR QL STRIP: NEGATIVE
HCT VFR BLD AUTO: 28.5 % (ref 40–54)
HGB BLD-MCNC: 9 GM/DL (ref 14–18)
HGB UR QL STRIP: NEGATIVE
IMM GRANULOCYTES # BLD AUTO: 0.01 K/UL (ref 0–0.04)
IMM GRANULOCYTES NFR BLD AUTO: 0.3 % (ref 0–0.5)
KETONES UR QL STRIP: NEGATIVE
LEUKOCYTE ESTERASE UR QL STRIP: NEGATIVE
LYMPHOCYTES # BLD AUTO: 1.16 K/UL (ref 1–4.8)
MCH RBC QN AUTO: 26.1 PG (ref 27–31)
MCHC RBC AUTO-ENTMCNC: 31.6 G/DL (ref 32–36)
MCV RBC AUTO: 83 FL (ref 82–98)
NITRITE UR QL STRIP: NEGATIVE
NUCLEATED RBC (/100WBC) (OHS): 0 /100 WBC
PH UR STRIP: 6 [PH]
PLATELET # BLD AUTO: 210 K/UL (ref 150–450)
PMV BLD AUTO: 10.1 FL (ref 9.2–12.9)
POC MOLECULAR INFLUENZA A AGN: NEGATIVE
POC MOLECULAR INFLUENZA B AGN: NEGATIVE
POTASSIUM SERPL-SCNC: 4.6 MMOL/L (ref 3.5–5.1)
PROT SERPL-MCNC: 6.9 GM/DL (ref 6–8.4)
PROT UR QL STRIP: NEGATIVE
RBC # BLD AUTO: 3.45 M/UL (ref 4.6–6.2)
RELATIVE EOSINOPHIL (OHS): 2.3 %
RELATIVE LYMPHOCYTE (OHS): 30.3 % (ref 18–48)
RELATIVE MONOCYTE (OHS): 11.2 % (ref 4–15)
RELATIVE NEUTROPHIL (OHS): 55.4 % (ref 38–73)
SARS-COV-2 RDRP RESP QL NAA+PROBE: NEGATIVE
SODIUM SERPL-SCNC: 138 MMOL/L (ref 136–145)
SP GR UR STRIP: 1.01
TROPONIN I SERPL HS-MCNC: <3 NG/L
TSH SERPL-ACNC: 0.88 UIU/ML (ref 0.4–4)
UROBILINOGEN UR STRIP-ACNC: NEGATIVE EU/DL
WBC # BLD AUTO: 3.83 K/UL (ref 3.9–12.7)

## 2025-08-29 PROCEDURE — 81003 URINALYSIS AUTO W/O SCOPE: CPT | Performed by: EMERGENCY MEDICINE

## 2025-08-29 PROCEDURE — 93005 ELECTROCARDIOGRAM TRACING: CPT

## 2025-08-29 PROCEDURE — 87502 INFLUENZA DNA AMP PROBE: CPT

## 2025-08-29 PROCEDURE — 85025 COMPLETE CBC W/AUTO DIFF WBC: CPT | Performed by: EMERGENCY MEDICINE

## 2025-08-29 PROCEDURE — 84443 ASSAY THYROID STIM HORMONE: CPT | Performed by: EMERGENCY MEDICINE

## 2025-08-29 PROCEDURE — 99285 EMERGENCY DEPT VISIT HI MDM: CPT | Mod: 25

## 2025-08-29 PROCEDURE — 93010 ELECTROCARDIOGRAM REPORT: CPT | Mod: ,,, | Performed by: INTERNAL MEDICINE

## 2025-08-29 PROCEDURE — 87635 SARS-COV-2 COVID-19 AMP PRB: CPT | Performed by: EMERGENCY MEDICINE

## 2025-08-29 PROCEDURE — 84484 ASSAY OF TROPONIN QUANT: CPT | Performed by: EMERGENCY MEDICINE

## 2025-08-29 PROCEDURE — 80053 COMPREHEN METABOLIC PANEL: CPT | Performed by: EMERGENCY MEDICINE

## 2025-08-30 VITALS
RESPIRATION RATE: 15 BRPM | BODY MASS INDEX: 19.16 KG/M2 | OXYGEN SATURATION: 100 % | WEIGHT: 126 LBS | SYSTOLIC BLOOD PRESSURE: 158 MMHG | DIASTOLIC BLOOD PRESSURE: 76 MMHG | TEMPERATURE: 98 F | HEART RATE: 68 BPM

## 2025-08-30 RX ORDER — ESCITALOPRAM OXALATE 5 MG/1
TABLET ORAL
COMMUNITY

## 2025-08-30 RX ORDER — ESCITALOPRAM OXALATE 10 MG/1
10 TABLET ORAL
COMMUNITY

## 2025-08-30 RX ORDER — MULTIVITAMIN
TABLET ORAL
COMMUNITY
Start: 2025-02-01

## 2025-08-30 RX ORDER — MIRTAZAPINE 7.5 MG/1
TABLET, FILM COATED ORAL
COMMUNITY
Start: 2025-02-01

## 2025-08-30 RX ORDER — VIT C/E/ZN/COPPR/LUTEIN/ZEAXAN 250MG-90MG
CAPSULE ORAL
COMMUNITY
Start: 2025-02-01

## 2025-09-02 LAB
OHS QRS DURATION: 98 MS
OHS QTC CALCULATION: 438 MS